# Patient Record
Sex: FEMALE | Race: OTHER | HISPANIC OR LATINO | Employment: FULL TIME | URBAN - METROPOLITAN AREA
[De-identification: names, ages, dates, MRNs, and addresses within clinical notes are randomized per-mention and may not be internally consistent; named-entity substitution may affect disease eponyms.]

---

## 2017-02-07 ENCOUNTER — ALLSCRIPTS OFFICE VISIT (OUTPATIENT)
Dept: OTHER | Facility: OTHER | Age: 22
End: 2017-02-07

## 2017-02-07 LAB — HCG, QUALITATIVE (HISTORICAL): NEGATIVE

## 2017-04-24 ENCOUNTER — GENERIC CONVERSION - ENCOUNTER (OUTPATIENT)
Dept: OTHER | Facility: OTHER | Age: 22
End: 2017-04-24

## 2017-04-24 DIAGNOSIS — N92.6 IRREGULAR MENSTRUATION: ICD-10-CM

## 2017-04-26 ENCOUNTER — APPOINTMENT (OUTPATIENT)
Dept: LAB | Facility: HOSPITAL | Age: 22
End: 2017-04-26
Attending: OBSTETRICS & GYNECOLOGY
Payer: COMMERCIAL

## 2017-04-26 ENCOUNTER — TRANSCRIBE ORDERS (OUTPATIENT)
Dept: ADMINISTRATIVE | Facility: HOSPITAL | Age: 22
End: 2017-04-26

## 2017-04-26 DIAGNOSIS — N92.6 IRREGULAR MENSTRUATION: ICD-10-CM

## 2017-04-26 LAB — B-HCG SERPL-ACNC: ABNORMAL MIU/ML

## 2017-04-26 PROCEDURE — 36415 COLL VENOUS BLD VENIPUNCTURE: CPT

## 2017-04-26 PROCEDURE — 84702 CHORIONIC GONADOTROPIN TEST: CPT

## 2017-04-27 ENCOUNTER — GENERIC CONVERSION - ENCOUNTER (OUTPATIENT)
Dept: OTHER | Facility: OTHER | Age: 22
End: 2017-04-27

## 2017-06-02 ENCOUNTER — HOSPITAL ENCOUNTER (EMERGENCY)
Facility: HOSPITAL | Age: 22
Discharge: HOME | End: 2017-06-02
Attending: EMERGENCY MEDICINE | Admitting: EMERGENCY MEDICINE
Payer: COMMERCIAL

## 2017-06-02 VITALS — TEMPERATURE: 98.7 F | OXYGEN SATURATION: 98 % | HEART RATE: 61 BPM | RESPIRATION RATE: 16 BRPM | WEIGHT: 130 LBS

## 2017-06-02 DIAGNOSIS — K04.7 DENTAL INFECTION: ICD-10-CM

## 2017-06-02 DIAGNOSIS — K08.89 PAIN, DENTAL: Primary | ICD-10-CM

## 2017-06-02 PROCEDURE — 99283 EMERGENCY DEPT VISIT LOW MDM: CPT

## 2017-06-02 RX ORDER — ALBUTEROL SULFATE 90 UG/1
2 AEROSOL, METERED RESPIRATORY (INHALATION) EVERY 6 HOURS PRN
COMMUNITY

## 2017-06-02 RX ORDER — AMOXICILLIN 500 MG/1
500 CAPSULE ORAL 3 TIMES DAILY
Qty: 21 CAPSULE | Refills: 0 | Status: SHIPPED | OUTPATIENT
Start: 2017-06-02 | End: 2017-06-09

## 2017-06-02 RX ORDER — AMOXICILLIN 250 MG/1
500 CAPSULE ORAL ONCE
Status: COMPLETED | OUTPATIENT
Start: 2017-06-02 | End: 2017-06-02

## 2017-06-02 RX ADMIN — AMOXICILLIN 500 MG: 250 CAPSULE ORAL at 23:12

## 2017-06-08 ENCOUNTER — GENERIC CONVERSION - ENCOUNTER (OUTPATIENT)
Dept: OTHER | Facility: OTHER | Age: 22
End: 2017-06-08

## 2017-06-08 ENCOUNTER — ALLSCRIPTS OFFICE VISIT (OUTPATIENT)
Dept: OTHER | Facility: OTHER | Age: 22
End: 2017-06-08

## 2017-06-08 DIAGNOSIS — Z34.90 ENCOUNTER FOR SUPERVISION OF NORMAL PREGNANCY: ICD-10-CM

## 2017-06-19 ENCOUNTER — GENERIC CONVERSION - ENCOUNTER (OUTPATIENT)
Dept: OTHER | Facility: OTHER | Age: 22
End: 2017-06-19

## 2017-06-19 ENCOUNTER — ALLSCRIPTS OFFICE VISIT (OUTPATIENT)
Dept: PERINATAL CARE | Facility: CLINIC | Age: 22
End: 2017-06-19
Payer: COMMERCIAL

## 2017-06-19 PROCEDURE — 76813 OB US NUCHAL MEAS 1 GEST: CPT | Performed by: OBSTETRICS & GYNECOLOGY

## 2017-06-22 ENCOUNTER — GENERIC CONVERSION - ENCOUNTER (OUTPATIENT)
Dept: OTHER | Facility: OTHER | Age: 22
End: 2017-06-22

## 2017-07-06 ENCOUNTER — GENERIC CONVERSION - ENCOUNTER (OUTPATIENT)
Dept: OTHER | Facility: OTHER | Age: 22
End: 2017-07-06

## 2017-07-20 LAB
EXTERNAL ABO GROUPING: NORMAL
EXTERNAL ANTIBODY SCREEN: NORMAL
EXTERNAL HEMATOCRIT: 35.4 %
EXTERNAL HEMOGLOBIN: 12 G/DL
EXTERNAL HEPATITIS B SURFACE ANTIGEN: NEGATIVE
EXTERNAL HIV-1 ANTIBODY: NEGATIVE
EXTERNAL PLATELET COUNT: 312 K/ΜL
EXTERNAL RH FACTOR: NEGATIVE
EXTERNAL RH FACTOR: POSITIVE
EXTERNAL RH FACTOR: POSITIVE
EXTERNAL RUBELLA IGG QUANTITATION: NORMAL
EXTERNAL SYPHILIS RPR SCREEN: NORMAL

## 2017-07-31 ENCOUNTER — ALLSCRIPTS OFFICE VISIT (OUTPATIENT)
Dept: PERINATAL CARE | Facility: CLINIC | Age: 22
End: 2017-07-31
Payer: COMMERCIAL

## 2017-07-31 ENCOUNTER — GENERIC CONVERSION - ENCOUNTER (OUTPATIENT)
Dept: OTHER | Facility: OTHER | Age: 22
End: 2017-07-31

## 2017-07-31 PROCEDURE — 76805 OB US >/= 14 WKS SNGL FETUS: CPT | Performed by: OBSTETRICS & GYNECOLOGY

## 2017-07-31 PROCEDURE — 76817 TRANSVAGINAL US OBSTETRIC: CPT | Performed by: OBSTETRICS & GYNECOLOGY

## 2017-08-07 ENCOUNTER — ALLSCRIPTS OFFICE VISIT (OUTPATIENT)
Dept: OTHER | Facility: OTHER | Age: 22
End: 2017-08-07

## 2017-08-07 LAB
BACTERIA UR QL AUTO: NEGATIVE
CLUE CELL (HISTORICAL): NEGATIVE
HYPHAL YEAST (HISTORICAL): POSITIVE
KOH PREP (HISTORICAL): NEGATIVE
PH UR STRIP.AUTO: 4.5 [PH]
TRICHOMONAS (HISTORICAL): NEGATIVE

## 2017-08-15 ENCOUNTER — GENERIC CONVERSION - ENCOUNTER (OUTPATIENT)
Dept: OTHER | Facility: OTHER | Age: 22
End: 2017-08-15

## 2017-10-10 ENCOUNTER — GENERIC CONVERSION - ENCOUNTER (OUTPATIENT)
Dept: OTHER | Facility: OTHER | Age: 22
End: 2017-10-10

## 2017-10-17 ENCOUNTER — ALLSCRIPTS OFFICE VISIT (OUTPATIENT)
Dept: OTHER | Facility: OTHER | Age: 22
End: 2017-10-17

## 2017-10-17 LAB — GLUCOSE 1H P 50 G GLC PO SERPL-MCNC: 111 MG/DL (ref 70–183)

## 2017-10-19 LAB — GLUCOSE, 1HR PP (HISTORICAL): 111 MG/DL (ref 65–199)

## 2017-10-20 ENCOUNTER — GENERIC CONVERSION - ENCOUNTER (OUTPATIENT)
Dept: OTHER | Facility: OTHER | Age: 22
End: 2017-10-20

## 2017-10-30 ENCOUNTER — ALLSCRIPTS OFFICE VISIT (OUTPATIENT)
Dept: OTHER | Facility: OTHER | Age: 22
End: 2017-10-30

## 2017-10-30 LAB — HGB BLD-MCNC: 9.8 G/DL

## 2017-11-03 ENCOUNTER — HOSPITAL ENCOUNTER (OUTPATIENT)
Facility: HOSPITAL | Age: 22
Discharge: HOME | End: 2017-11-03
Attending: OBSTETRICS & GYNECOLOGY | Admitting: OBSTETRICS & GYNECOLOGY
Payer: COMMERCIAL

## 2017-11-03 VITALS
OXYGEN SATURATION: 98 % | HEART RATE: 91 BPM | RESPIRATION RATE: 20 BRPM | DIASTOLIC BLOOD PRESSURE: 81 MMHG | SYSTOLIC BLOOD PRESSURE: 126 MMHG | TEMPERATURE: 97.9 F

## 2017-11-03 DIAGNOSIS — O36.8190 DECREASED FETAL MOVEMENT: ICD-10-CM

## 2017-11-03 PROCEDURE — G0463 HOSPITAL OUTPT CLINIC VISIT: HCPCS

## 2017-11-03 PROCEDURE — 99201 HB OFFICE/OUTPATIENT VISIT NEW (BRIEF): CPT

## 2017-11-04 NOTE — PROGRESS NOTES
Pt request to sign AMA  States no bleeding since admission  Denies pain or contractions  Fetus "very active"  Risks explained   AMA form signed by patient

## 2017-11-14 ENCOUNTER — GENERIC CONVERSION - ENCOUNTER (OUTPATIENT)
Dept: OTHER | Facility: OTHER | Age: 22
End: 2017-11-14

## 2017-11-27 ENCOUNTER — ALLSCRIPTS OFFICE VISIT (OUTPATIENT)
Dept: OTHER | Facility: OTHER | Age: 22
End: 2017-11-27

## 2017-11-27 ENCOUNTER — GENERIC CONVERSION - ENCOUNTER (OUTPATIENT)
Dept: OTHER | Facility: OTHER | Age: 22
End: 2017-11-27

## 2017-11-27 LAB — EXTERNAL GROUP B STREP ANTIGEN: NEGATIVE

## 2017-12-01 LAB — CULTURE GENITAL-BSB ON (HISTORICAL): NEGATIVE

## 2017-12-04 ENCOUNTER — ALLSCRIPTS OFFICE VISIT (OUTPATIENT)
Dept: OTHER | Facility: OTHER | Age: 22
End: 2017-12-04

## 2017-12-04 ENCOUNTER — GENERIC CONVERSION - ENCOUNTER (OUTPATIENT)
Dept: OTHER | Facility: OTHER | Age: 22
End: 2017-12-04

## 2017-12-06 ENCOUNTER — GENERIC CONVERSION - ENCOUNTER (OUTPATIENT)
Dept: OTHER | Facility: OTHER | Age: 22
End: 2017-12-06

## 2017-12-07 ENCOUNTER — HOSPITAL ENCOUNTER (INPATIENT)
Facility: HOSPITAL | Age: 22
LOS: 2 days | Discharge: HOME | End: 2017-12-10
Attending: OBSTETRICS & GYNECOLOGY | Admitting: OBSTETRICS & GYNECOLOGY
Payer: COMMERCIAL

## 2017-12-07 ENCOUNTER — HOSPITAL ENCOUNTER (OUTPATIENT)
Facility: HOSPITAL | Age: 22
Discharge: HOME | End: 2017-12-07
Attending: OBSTETRICS & GYNECOLOGY | Admitting: OBSTETRICS & GYNECOLOGY
Payer: COMMERCIAL

## 2017-12-07 ENCOUNTER — GENERIC CONVERSION - ENCOUNTER (OUTPATIENT)
Dept: OTHER | Facility: OTHER | Age: 22
End: 2017-12-07

## 2017-12-07 VITALS
SYSTOLIC BLOOD PRESSURE: 130 MMHG | HEART RATE: 100 BPM | RESPIRATION RATE: 17 BRPM | TEMPERATURE: 98.6 F | DIASTOLIC BLOOD PRESSURE: 61 MMHG

## 2017-12-07 DIAGNOSIS — Z3A.38 38 WEEKS GESTATION OF PREGNANCY: ICD-10-CM

## 2017-12-07 PROCEDURE — G0463 HOSPITAL OUTPT CLINIC VISIT: HCPCS

## 2017-12-07 PROCEDURE — 99213 OFFICE O/P EST LOW 20 MIN: CPT

## 2017-12-07 NOTE — PROGRESS NOTES
Patient discharged to home by doctor  Discharge instructions/precautions reviewed with patient by Dr Alissa Mahan  Patient verbalizes understanding & denies further questions at this time

## 2017-12-07 NOTE — PROGRESS NOTES
Progress Note - OB/GYN   Justice Civil 20 y o  female MRN: [id number]  Unit/Bed#: L&D 329-01 Encounter: [id number]    Assessment:  21yo  @ 38 1wga here with decreased fetal movement, movement reassured  Plan:  D/C home with labor precautions  Reviewed techniques to improve fetal movement - PO hydration, frequent meals, sugary snack/drink, palpation of abdomen with hands  Recommend patient to follow-up in office tomorrow  D/W Dr Dina Paul  Subjective/Objective   Chief Complaint: decreased fetal movement since Monday    Subjective:   -reports decreased FM since Monday intermittently  -was seen in office ANGELIC 9cm, NST reactive & reassuring  -denies any changes in life stressors or work or home  -denies cxtns, LOF, VB    Objective:   -'sbpm reactive  -[place] occasional  -TAUS: vertex, +fetal movement, ANGELIC 10 86, LVP 4 22cm  -BPP: 8/10 (2/2 NST, 2/2 ANGELIC, 2/2 fetal tone, 2/2 fetal movements)    Vitals: Blood pressure 130/61, pulse 100, temperature 98 6 °F (37 °C), temperature source Oral, resp  rate 17, last menstrual period 2017  ,There is no height or weight on file to calculate BMI      No intake or output data in the 24 hours ending 17 1334    Invasive Devices          No matching active lines, drains, or airways

## 2017-12-08 ENCOUNTER — ANESTHESIA (INPATIENT)
Dept: LABOR AND DELIVERY | Facility: HOSPITAL | Age: 22
End: 2017-12-08
Payer: COMMERCIAL

## 2017-12-08 ENCOUNTER — ANESTHESIA EVENT (INPATIENT)
Dept: LABOR AND DELIVERY | Facility: HOSPITAL | Age: 22
End: 2017-12-08
Payer: COMMERCIAL

## 2017-12-08 LAB
ABO GROUP BLD: NORMAL
BASE EXCESS BLDCOA CALC-SCNC: 0.5 MMOL/L (ref 3–11)
BASE EXCESS BLDCOV CALC-SCNC: -1 MMOL/L (ref 1–9)
BLD GP AB SCN SERPL QL: NEGATIVE
ERYTHROCYTE [DISTWIDTH] IN BLOOD BY AUTOMATED COUNT: 14 % (ref 11.6–15.1)
HCO3 BLDCOA-SCNC: 27.9 MMOL/L (ref 17.3–27.3)
HCO3 BLDCOV-SCNC: 23.1 MMOL/L (ref 12.2–28.6)
HCT VFR BLD AUTO: 33.3 % (ref 34.8–46.1)
HGB BLD-MCNC: 10.9 G/DL (ref 11.5–15.4)
MCH RBC QN AUTO: 25.6 PG (ref 26.8–34.3)
MCHC RBC AUTO-ENTMCNC: 32.7 G/DL (ref 31.4–37.4)
MCV RBC AUTO: 78 FL (ref 82–98)
O2 CT VFR BLDCOA CALC: 3.1 ML/DL
OXYHGB MFR BLDCOA: 14.7 %
OXYHGB MFR BLDCOV: 52 %
PCO2 BLDCOA: 56 MM[HG] (ref 30–60)
PCO2 BLDCOV: 37 MM HG (ref 27–43)
PH BLDCOA: 7.32 [PH] (ref 7.23–7.43)
PH BLDCOV: 7.41 [PH] (ref 7.19–7.49)
PLATELET # BLD AUTO: 321 THOUSANDS/UL (ref 149–390)
PMV BLD AUTO: 10.1 FL (ref 8.9–12.7)
PO2 BLDCOA: 10.9 MM HG (ref 5–25)
PO2 BLDCOV: 22.3 MM HG (ref 15–45)
RBC # BLD AUTO: 4.26 MILLION/UL (ref 3.81–5.12)
RH BLD: POSITIVE
RPR SER QL: NORMAL
SAO2 % BLDCOV: 11.5 ML/DL
SPECIMEN EXPIRATION DATE: NORMAL
WBC # BLD AUTO: 11.96 THOUSAND/UL (ref 4.31–10.16)

## 2017-12-08 PROCEDURE — 85027 COMPLETE CBC AUTOMATED: CPT | Performed by: OBSTETRICS & GYNECOLOGY

## 2017-12-08 PROCEDURE — 3E0R3BZ INTRODUCTION OF ANESTHETIC AGENT INTO SPINAL CANAL, PERCUTANEOUS APPROACH: ICD-10-PCS | Performed by: ANESTHESIOLOGY

## 2017-12-08 PROCEDURE — 00HU33Z INSERTION OF INFUSION DEVICE INTO SPINAL CANAL, PERCUTANEOUS APPROACH: ICD-10-PCS | Performed by: ANESTHESIOLOGY

## 2017-12-08 PROCEDURE — 86850 RBC ANTIBODY SCREEN: CPT | Performed by: OBSTETRICS & GYNECOLOGY

## 2017-12-08 PROCEDURE — 10907ZC DRAINAGE OF AMNIOTIC FLUID, THERAPEUTIC FROM PRODUCTS OF CONCEPTION, VIA NATURAL OR ARTIFICIAL OPENING: ICD-10-PCS | Performed by: OBSTETRICS & GYNECOLOGY

## 2017-12-08 PROCEDURE — 3E033VJ INTRODUCTION OF OTHER HORMONE INTO PERIPHERAL VEIN, PERCUTANEOUS APPROACH: ICD-10-PCS | Performed by: OBSTETRICS & GYNECOLOGY

## 2017-12-08 PROCEDURE — 88307 TISSUE EXAM BY PATHOLOGIST: CPT | Performed by: OBSTETRICS & GYNECOLOGY

## 2017-12-08 PROCEDURE — 86900 BLOOD TYPING SEROLOGIC ABO: CPT | Performed by: OBSTETRICS & GYNECOLOGY

## 2017-12-08 PROCEDURE — 86592 SYPHILIS TEST NON-TREP QUAL: CPT | Performed by: OBSTETRICS & GYNECOLOGY

## 2017-12-08 PROCEDURE — 82805 BLOOD GASES W/O2 SATURATION: CPT | Performed by: OBSTETRICS & GYNECOLOGY

## 2017-12-08 PROCEDURE — 86901 BLOOD TYPING SEROLOGIC RH(D): CPT | Performed by: OBSTETRICS & GYNECOLOGY

## 2017-12-08 RX ORDER — DIPHENHYDRAMINE HCL 25 MG
25 TABLET ORAL EVERY 6 HOURS PRN
Status: DISCONTINUED | OUTPATIENT
Start: 2017-12-08 | End: 2017-12-10 | Stop reason: HOSPADM

## 2017-12-08 RX ORDER — CALCIUM CARBONATE 200(500)MG
1000 TABLET,CHEWABLE ORAL DAILY PRN
Status: DISCONTINUED | OUTPATIENT
Start: 2017-12-08 | End: 2017-12-10 | Stop reason: HOSPADM

## 2017-12-08 RX ORDER — IBUPROFEN 600 MG/1
600 TABLET ORAL EVERY 4 HOURS PRN
Status: DISCONTINUED | OUTPATIENT
Start: 2017-12-08 | End: 2017-12-09

## 2017-12-08 RX ORDER — SIMETHICONE 80 MG
80 TABLET,CHEWABLE ORAL 4 TIMES DAILY PRN
Status: DISCONTINUED | OUTPATIENT
Start: 2017-12-08 | End: 2017-12-10 | Stop reason: HOSPADM

## 2017-12-08 RX ORDER — SODIUM CHLORIDE, SODIUM LACTATE, POTASSIUM CHLORIDE, CALCIUM CHLORIDE 600; 310; 30; 20 MG/100ML; MG/100ML; MG/100ML; MG/100ML
125 INJECTION, SOLUTION INTRAVENOUS CONTINUOUS
Status: DISCONTINUED | OUTPATIENT
Start: 2017-12-08 | End: 2017-12-08

## 2017-12-08 RX ORDER — ONDANSETRON 2 MG/ML
4 INJECTION INTRAMUSCULAR; INTRAVENOUS EVERY 8 HOURS PRN
Status: DISCONTINUED | OUTPATIENT
Start: 2017-12-08 | End: 2017-12-10 | Stop reason: HOSPADM

## 2017-12-08 RX ORDER — OXYTOCIN/RINGER'S LACTATE 30/500 ML
2 PLASTIC BAG, INJECTION (ML) INTRAVENOUS
Status: DISCONTINUED | OUTPATIENT
Start: 2017-12-08 | End: 2017-12-08

## 2017-12-08 RX ORDER — DOCUSATE SODIUM 100 MG/1
100 CAPSULE, LIQUID FILLED ORAL 2 TIMES DAILY
Status: DISCONTINUED | OUTPATIENT
Start: 2017-12-08 | End: 2017-12-10 | Stop reason: HOSPADM

## 2017-12-08 RX ORDER — ALBUTEROL SULFATE 90 UG/1
2 AEROSOL, METERED RESPIRATORY (INHALATION) EVERY 6 HOURS PRN
Status: DISCONTINUED | OUTPATIENT
Start: 2017-12-08 | End: 2017-12-08

## 2017-12-08 RX ORDER — ONDANSETRON 2 MG/ML
4 INJECTION INTRAMUSCULAR; INTRAVENOUS EVERY 6 HOURS PRN
Status: DISCONTINUED | OUTPATIENT
Start: 2017-12-08 | End: 2017-12-08

## 2017-12-08 RX ORDER — ACETAMINOPHEN 325 MG/1
650 TABLET ORAL EVERY 4 HOURS PRN
Status: DISCONTINUED | OUTPATIENT
Start: 2017-12-08 | End: 2017-12-10 | Stop reason: HOSPADM

## 2017-12-08 RX ORDER — DIAPER,BRIEF,INFANT-TODD,DISP
1 EACH MISCELLANEOUS AS NEEDED
Status: DISCONTINUED | OUTPATIENT
Start: 2017-12-08 | End: 2017-12-10 | Stop reason: HOSPADM

## 2017-12-08 RX ORDER — OXYCODONE HYDROCHLORIDE AND ACETAMINOPHEN 5; 325 MG/1; MG/1
2 TABLET ORAL EVERY 4 HOURS PRN
Status: DISCONTINUED | OUTPATIENT
Start: 2017-12-08 | End: 2017-12-10 | Stop reason: HOSPADM

## 2017-12-08 RX ORDER — OXYCODONE HYDROCHLORIDE AND ACETAMINOPHEN 5; 325 MG/1; MG/1
1 TABLET ORAL EVERY 4 HOURS PRN
Status: DISCONTINUED | OUTPATIENT
Start: 2017-12-08 | End: 2017-12-10 | Stop reason: HOSPADM

## 2017-12-08 RX ORDER — ROPIVACAINE HYDROCHLORIDE 2 MG/ML
INJECTION, SOLUTION EPIDURAL; INFILTRATION; PERINEURAL AS NEEDED
Status: DISCONTINUED | OUTPATIENT
Start: 2017-12-08 | End: 2017-12-08 | Stop reason: SURG

## 2017-12-08 RX ORDER — OXYTOCIN/RINGER'S LACTATE 30/500 ML
2 PLASTIC BAG, INJECTION (ML) INTRAVENOUS CONTINUOUS
Status: DISCONTINUED | OUTPATIENT
Start: 2017-12-08 | End: 2017-12-08

## 2017-12-08 RX ORDER — EPHEDRINE SULFATE 50 MG/ML
INJECTION, SOLUTION INTRAVENOUS AS NEEDED
Status: DISCONTINUED | OUTPATIENT
Start: 2017-12-08 | End: 2017-12-08 | Stop reason: SURG

## 2017-12-08 RX ADMIN — IBUPROFEN 600 MG: 600 TABLET, FILM COATED ORAL at 16:25

## 2017-12-08 RX ADMIN — EPHEDRINE SULFATE 10 MG: 50 INJECTION, SOLUTION INTRAMUSCULAR; INTRAVENOUS; SUBCUTANEOUS at 09:35

## 2017-12-08 RX ADMIN — ROPIVACAINE HYDROCHLORIDE: 2 INJECTION, SOLUTION EPIDURAL; INFILTRATION at 08:45

## 2017-12-08 RX ADMIN — ROPIVACAINE HYDROCHLORIDE 5 ML: 2 INJECTION, SOLUTION EPIDURAL; INFILTRATION at 08:42

## 2017-12-08 RX ADMIN — IBUPROFEN 600 MG: 600 TABLET, FILM COATED ORAL at 22:12

## 2017-12-08 RX ADMIN — SODIUM CHLORIDE, SODIUM LACTATE, POTASSIUM CHLORIDE, AND CALCIUM CHLORIDE 999 ML/HR: .6; .31; .03; .02 INJECTION, SOLUTION INTRAVENOUS at 07:51

## 2017-12-08 RX ADMIN — SODIUM CHLORIDE, SODIUM LACTATE, POTASSIUM CHLORIDE, AND CALCIUM CHLORIDE 125 ML/HR: .6; .31; .03; .02 INJECTION, SOLUTION INTRAVENOUS at 00:31

## 2017-12-08 RX ADMIN — ONDANSETRON 4 MG: 2 INJECTION INTRAMUSCULAR; INTRAVENOUS at 05:06

## 2017-12-08 RX ADMIN — ROPIVACAINE HYDROCHLORIDE 5 ML: 2 INJECTION, SOLUTION EPIDURAL; INFILTRATION at 08:39

## 2017-12-08 RX ADMIN — DOCUSATE SODIUM 100 MG: 100 CAPSULE, LIQUID FILLED ORAL at 18:19

## 2017-12-08 RX ADMIN — SODIUM CHLORIDE, SODIUM LACTATE, POTASSIUM CHLORIDE, AND CALCIUM CHLORIDE 125 ML/HR: .6; .31; .03; .02 INJECTION, SOLUTION INTRAVENOUS at 09:01

## 2017-12-08 RX ADMIN — SODIUM CHLORIDE, SODIUM LACTATE, POTASSIUM CHLORIDE, AND CALCIUM CHLORIDE 125 ML/HR: .6; .31; .03; .02 INJECTION, SOLUTION INTRAVENOUS at 10:11

## 2017-12-08 RX ADMIN — Medication 2 MILLI-UNITS/MIN: at 00:32

## 2017-12-08 NOTE — OB LABOR/OXYTOCIN SAFETY PROGRESS
Oxytocin Safety Progress Check Note - Gus Paez 20 y o  female MRN: [id number]    Unit/Bed#: L&D 326-01 Encounter: [id number]    Obstetric History       T1      L1     SAB0   TAB0   Ectopic0   Multiple0   Live Births1      Gestational Age: 36w4d  Dose (fidel-units/min) Oxytocin: 12 fidel-units/min  Contraction Frequency (minutes): 2-3 5  Contraction Quality: Moderate  Tachysystole: No   Dilation: 10  Dilation Complete Date: 17  Dilation Complete Time: 1024  Effacement (%): 100  Station: 1  Baseline Rate: 130 bpm     FHR Category: Category I          Notes/comments:     Patient is complete  Does not feel need to push - will labor down  FHT Category I    Will discuss with Dr Jes Morales MD 2017 10:24 AM

## 2017-12-08 NOTE — PLAN OF CARE
Problem: BIRTH - VAGINAL/ SECTION  Goal: Fetal and maternal status remain reassuring during the birth process  INTERVENTIONS:  - Monitor vital signs  - Monitor fetal heart rate  - Monitor uterine activity  - Monitor labor progression (vaginal delivery)  - DVT prophylaxis  - Antibiotic prophylaxis   Outcome: Completed Date Met: 17    Goal: Emotionally satisfying birthing experience for mother/fetus  Interventions:  - Assess, plan, implement and evaluate the nursing care given to the patient in labor  - Advocate the philosophy that each childbirth experience is a unique experience and support the family's chosen level of involvement and control during the labor process   - Actively participate in both the patient's and family's teaching of the birth process  - Consider cultural, [employer] and age-specific factors and plan care for the patient in labor   Outcome: Completed Date Met: 17      Problem: Knowledge Deficit  Goal: Verbalizes understanding of labor plan  Assess patient/family/caregiver's baseline knowledge level and ability to understand information  Provide education via patient/family/caregiver's preferred learning method at appropriate level of understanding  1  Provide teaching at level of understanding  2  Provide teaching via preferred learning method(s)  Outcome: Completed Date Met: 17      Problem: Labor & Delivery  Goal: Manages discomfort  Assess and monitor for signs and symptoms of discomfort  Assess patient's pain level regularly and per hospital policy  Administer medications as ordered  Support use of nonpharmacological methods to help control pain such as distraction, imagery, relaxation, and application of heat and cold  Collaborate with interdisciplinary team and patient to determine appropriate pain management plan  1  Include patient in decisions related to comfort  2  Offer non-pharmacological pain management interventions    3  Report ineffective pain management to physician  Outcome: Completed Date Met: 12/08/17    Goal: Patient vital signs are stable  1  Assess vital signs - vaginal delivery     Outcome: Completed Date Met: 12/08/17

## 2017-12-08 NOTE — DISCHARGE INSTRUCTIONS
Vaginal Delivery   WHAT YOU SHOULD KNOW:   A vaginal delivery is the birth of your baby through your vagina (birth canal)  AFTER YOU LEAVE:   Medicines:  · NSAIDs  help decrease swelling and pain or fever  This medicine is available with or without a doctor's order  NSAIDs can cause stomach bleeding or kidney problems in certain people  If you take blood thinner medicine, always ask your healthcare provider if NSAIDs are safe for you  Always read the medicine label and follow directions  · Take your medicine as directed  Call your healthcare provider if you think your medicine is not helping or if you have side effects  Tell him if you are allergic to any medicine  Keep a list of the medicines, vitamins, and herbs you take  Include the amounts, and when and why you take them  Bring the list or the pill bottles to follow-up visits  Carry your medicine list with you in case of an emergency  Follow up with your primary healthcare provider:  Most women need to return 6 weeks after a vaginal delivery  Ask about how to care for your wounds or stitches  Write down your questions so you remember to ask them during your visits  Activity:  Rest as much as possible  Try to keep all activities short  You may be able to do some exercise soon after you have your baby  Talk with your primary healthcare provider before you start exercising  If you work outside the home, ask when you can return to your job  Kegel exercises:  Kegel exercises may help your vaginal and rectal muscles heal faster  You can do Kegel exercises by tightening and relaxing the muscles around your vagina  Kegel exercises help make the muscles stronger  Breast care:  When your milk comes in, your breasts may feel full and hard  Ask how to care for your breasts, even if you are not breastfeeding  Constipation:  Do not try to push the bowel movement out if it is too hard   High-fiber foods, extra liquids, and regular exercise can help you prevent constipation  Examples of high-fiber foods are fruit and bran  Prune juice and water are good liquids to drink  Regular exercise helps your digestive system work  You may also be told to take over-the-counter fiber and stool softener medicines  Take these items as directed  Hemorrhoids:  Pregnancy can cause severe hemorrhoids  You may have rectal pain because of the hemorrhoids  Ask how to prevent or treat hemorrhoids  Perineum care: Your perineum is the area between your vagina and anus  Keep the area clean and dry to help it heal and to prevent infection  Wash the area gently with soap and water when you bathe or shower  Rinse your perineum with warm water when you use the toilet  Your primary healthcare provider may suggest you use a warm sitz bath to help decrease pain  A sitz bath is a bathtub or basin filled to hip level  Stay in the sitz bath for 20 to 30 minutes, or as directed  Vaginal discharge: You will have vaginal discharge, called lochia, after your delivery  The lochia is bright red the first day or two after the birth  By the fourth day, the amount decreases, and it turns red-brown  Use a sanitary pad rather than a tampon to prevent a vaginal infection  It is normal to have lochia up to 8 weeks after your baby is born  Monthly periods: Your period may start again within 7 to 12 weeks after your baby is born  If you are breastfeeding, it may take longer for your period to start again  You can still get pregnant again even though you do not have your monthly period  Talk with your primary healthcare provider about a birth control method that will be good for you if you do not want to get pregnant  Mood changes: Many new mothers have some kind of mood changes after delivery  Some of these changes occur because of lack of sleep, hormone changes, and caring for a new baby  Some mood changes can be more serious, such as postpartum depression   Talk with your primary healthcare provider if you feel unable to care for yourself or your baby  Sexual activity:  You may need to avoid sex for 6 to 7 weeks after you have your baby  You may notice you have a decreased desire for sex, or sex may be painful  You may need to use a vaginal lubricant (gel) to help make sex more comfortable  Contact your primary healthcare provider if:   · You have heavy vaginal bleeding that fills 1 or more sanitary pads in 1 hour  · You have a fever  · Your pain does not go away, or gets worse  · The skin between your vagina and rectum is swollen, warm, or red  · You have swollen, hard, or painful breasts  · You feel very sad or depressed  · You feel more tired than usual      · You have questions or concerns about your condition or care  Seek care immediately or call 911 if:   · You have pus or yellow drainage coming from your vagina or wound  · You are urinating very little, or not at all  · Your arm or leg feels warm, tender, and painful  It may look swollen and red  · You feel lightheaded, have sudden and worsening chest pain, or trouble breathing  You may have more pain when you take deep breaths or cough, or you may cough up blood  © 2014 [address] is for End User's use only and may not be sold, redistributed or otherwise used for commercial purposes  All illustrations and images included in CareNotes® are the copyrighted property of [employer]  or [employer]  The above information is an  only  It is not intended as medical advice for individual conditions or treatments  Talk to your doctor, nurse or pharmacist before following any medical regimen to see if it is safe and effective for you

## 2017-12-08 NOTE — DISCHARGE SUMMARY
Discharge Summary - Justice Civil 20 y o  female MRN: [id number]    Unit/Bed#: L&D 326-01 Encounter: [id number]    Admission Date: 2017     Discharge Date: 12/10/2017    Attending: Dr Jus Martin    Principal Diagnosis:   Decreased fetal movement affecting management of mother, antepartum [O36 [id number]]  Pregnancy at 38w1d    Secondary Diagnosis:   Anemia  LSIL  Mild intermittent asthma    Procedures: Spontaneous Vaginal Delivery    Complications: none apparent    Hospital Course: Ms Ankush Holcomb is a 20 y o   at 38wk1d  She presented to labor and delivery for induction of labor for persistent decreased fetal movement  Her pregnancy was complicated by iron deficiency anemia, a history of LSIL, and mild intermittent asthma  On exam in triage she was noted to be 3-4/70/-2  She was admitted for induction of labor  She was managed expectantly for labor and received an epidural for pain control  Patient was started on low dose pitocin titration and was AROM'd  She delivered via spontaneous vaginal delivery with no complications and bilateral periurethral lacerations that did not require repair  She had an uncomplicated postpartum course  Condition at discharge: good     On day of discharge, patient was tolerating PO, passing flatus, urinating, and ambulating  Her pain was well controlled with oral analgesics  She was discharged home on postpartum day #2 with standard post partum instructions to follow up with her physician in 3-6 weeks for a postpartum appointment  Discharge instructions/Information to patient and family:   See after visit summary for information provided to patient and family  Provisions for Follow-Up Care:  See after visit summary for information related to follow-up care and any pertinent home health orders  Disposition: See After Visit Summary for discharge disposition information  Planned Readmission: No    Discharge Medications:   For a complete list of the patient's medications, please refer to her med rec      Radha Gregory MD  12/10/2017  7:28 AM

## 2017-12-08 NOTE — OB LABOR/OXYTOCIN SAFETY PROGRESS
Oxytocin Safety Progress Check Note - Anastasiya Tiana 20 y o  female MRN: [id number]    Unit/Bed#: L&D 326-01 Encounter: [id number]    Obstetric History       T1      L1     SAB0   TAB0   Ectopic0   Multiple0   Live Births1      Gestational Age: 38w2d  Dose (fidel-units/min) Oxytocin: 10 fidel-units/min  Contraction Frequency (minutes): 2-3 5  Contraction Quality: Moderate  Tachysystole: No   Dilation: 5        Effacement (%): 100  Station: -1  Baseline Rate: 125 bpm (difficult tracing due to maternal movement)     FHR Category: Category I          Notes/comments:      Patient comfortable with epidural  Continue pitocin titration with FHT monitoring  Ctx every 2-3 mins  Category I strip    Will discuss with Dr Kiki Ordonez MD 2017 9:30 AM

## 2017-12-08 NOTE — ANESTHESIA PREPROCEDURE EVALUATION
Review of Systems/Medical History  Patient summary reviewed        Cardiovascular  Negative cardio ROS    Pulmonary  Negative pulmonary ROS Asthma: , ,        GI/Hepatic  Negative GI/hepatic ROS          Negative  ROS        Endo/Other  Negative endo/other ROS      GYN  Negative gynecology ROS          Hematology  Negative hematology ROS      Musculoskeletal  Negative musculoskeletal ROS        Neurology  Negative neurology ROS      Psychology   Negative psychology ROS            Physical Exam    Airway    Mallampati score: II  TM Distance: >3 FB  Neck ROM: full     Dental       Cardiovascular  Comment: Negative ROS, Rhythm: regular, Rate: normal, Cardiovascular exam normal    Pulmonary  Pulmonary exam normal Breath sounds clear to auscultation,     Other Findings        Anesthesia Plan  ASA Score- 2       Anesthesia Type- epidural with ASA Monitors  Additional Monitors:   Airway Plan:           Induction-       Informed Consent- Anesthetic plan and risks discussed with patient

## 2017-12-08 NOTE — PLAN OF CARE
Problem: Knowledge Deficit  Goal: Patient/family/caregiver demonstrates understanding of disease process, treatment plan, medications, and discharge instructions  Complete learning assessment and assess knowledge base    Interventions:  - Provide teaching at level of understanding  - Provide teaching via preferred learning methods  Outcome: Progressing      Problem: PAIN - ADULT  Goal: Verbalizes/displays adequate comfort level or baseline comfort level  Interventions:  - Encourage patient to monitor pain and request assistance  - Assess pain using appropriate pain scale  - Administer analgesics based on type and severity of pain and evaluate response  - Implement non-pharmacological measures as appropriate and evaluate response  - Consider cultural and social influences on pain and pain management  - Notify physician/advanced practitioner if interventions unsuccessful or patient reports new pain  Outcome: Progressing      Problem: INFECTION - ADULT  Goal: Absence or prevention of progression during hospitalization  INTERVENTIONS:  - Assess and monitor for signs and symptoms of infection  - Monitor lab/diagnostic results  - Monitor all insertion sites, i e  indwelling lines, tubes, and drains  - Monitor endotracheal (as able) and nasal secretions for changes in amount and color  - [place] appropriate cooling/warming therapies per order  - Administer medications as ordered  - Instruct and encourage patient and family to use good hand hygiene technique  - Identify and instruct in appropriate isolation precautions for identified infection/condition  Outcome: Progressing    Goal: Absence of fever/infection during neutropenic period  INTERVENTIONS:  - Monitor WBC  - Implement neutropenic guidelines  Outcome: Progressing      Problem: SAFETY ADULT  Goal: Patient will remain free of falls  INTERVENTIONS:  - Assess patient frequently for physical needs  -  Identify cognitive and physical deficits and behaviors that affect risk of falls    -  [place] fall precautions as indicated by assessment   - Educate patient/family on patient safety including physical limitations  - Instruct patient to call for assistance with activity based on assessment  - Modify environment to reduce risk of injury  - Consider OT/PT consult to assist with strengthening/mobility  Outcome: Progressing    Goal: Maintain or return to baseline ADL function  INTERVENTIONS:  -  Assess patient's ability to carry out ADLs; assess patient's baseline for ADL function and identify physical deficits which impact ability to perform ADLs (bathing, care of mouth/teeth, toileting, grooming, dressing, etc )  - Assess/evaluate cause of self-care deficits   - Assess range of motion  - Assess patient's mobility; develop plan if impaired  - Assess patient's need for assistive devices and provide as appropriate  - Encourage maximum independence but intervene and supervise when necessary  ¯ Involve family in performance of ADLs  ¯ Assess for home care needs following discharge   ¯ Request OT consult to assist with ADL evaluation and planning for discharge  ¯ Provide patient education as appropriate  Outcome: Progressing    Goal: Maintain or return mobility status to optimal level  INTERVENTIONS:  - Assess patient's baseline mobility status (ambulation, transfers, stairs, etc )    - Identify cognitive and physical deficits and behaviors that affect mobility  - Identify mobility aids required to assist with transfers and/or ambulation (gait belt, sit-to-stand, lift, walker, cane, etc )  - [place] fall precautions as indicated by assessment  - Record patient progress and toleration of activity level on Mobility SBAR; progress patient to next Phase/Stage  - Instruct patient to call for assistance with activity based on assessment  - Request Rehabilitation consult to assist with strengthening/weightbearing, etc   Outcome: Progressing      Problem: DISCHARGE PLANNING  Goal: Discharge to home or other facility with appropriate resources  INTERVENTIONS:  - Identify barriers to discharge w/patient and caregiver  - Arrange for needed discharge resources and transportation as appropriate  - Identify discharge learning needs (meds, wound care, etc )  - Arrange for interpretive services to assist at discharge as needed  - Refer to Case Management Department for coordinating discharge planning if the patient needs post-hospital services based on physician/advanced practitioner order or complex needs related to functional status, cognitive ability, or social support system  Outcome: Progressing

## 2017-12-08 NOTE — H&P
History & Physical - OB/GYN   Neri Mendez 20 y o  female MRN: [id number]  Unit/Bed#: L&D 326-01 Encounter: [id number]    Neri Mendez is a patient of [facility]    Chief complaint:  "I haven't felt my baby move as much since Monday"    HPI:  Neri Mendez is a 20 y o   female with an JILLIAN of 2017, by Ultrasound at 38w1d weeks gestation who is being admitted for Induction of labor for persistent decreased fetal movement  She states that although she feels the fetus move; it moves perhaps 4-5x in a timeframe that it would normally move twice as much  This has been consistent over the course of Monday, Tuesday, Wednesday, and Thursday and she is quite concerned  Otherwise, she states she feels well  Does not feel her contractions and has no vaginal bleeding or leakage of fluids from below  Contractions:  no  Fetal movement:  Yes, but decreased, see above  Vaginal bleeding:   no  Leaking of fluid:  no    Pregnancy Complications:  Anemia  LSIL  Mild intermittent asthma - Albuterol PRN    PMH:  Past Medical History:   Diagnosis Date    Asthma      PSH:  Past Surgical History:   Procedure Laterality Date    TONSILLECTOMY         Social Hx:  Denies EtOH, cigarette, and recreational drug use in pregnancy    OB Hx:  Obstetric History       T1      L1     SAB0   TAB0   Ectopic0   Multiple0   Live Births1       # Outcome Date GA Lbr Ronald/2nd Weight Sex Delivery Anes PTL Lv   3 Current            2 Term 13 40w0d   M    MARGO   1                    Meds:  No current facility-administered medications on file prior to encounter  Current Outpatient Prescriptions on File Prior to Encounter   Medication Sig Dispense Refill    albuterol (PROVENTIL HFA,VENTOLIN HFA) 90 mcg/act inhaler Inhale 2 puffs every 6 (six) hours as needed for wheezing         Allergies:  No Known Allergies    Review of Systems   Respiratory: Negative for chest tightness      Cardiovascular: Negative for chest pain and leg swelling  Gastrointestinal: Negative for diarrhea, nausea and vomiting  Genitourinary: Negative for flank pain and vaginal bleeding  Skin: Negative for rash  Physical Exam   Constitutional: She is oriented to person, place, and time  She appears well-developed and well-nourished  HENT:   Head: Normocephalic and atraumatic  Eyes: Pupils are equal, round, and reactive to light  Neck: Normal range of motion  Neck supple  Cardiovascular: Normal rate  Pulmonary/Chest: Effort normal and breath sounds normal  She has no wheezes  Abdominal: Soft  Bowel sounds are normal  There is no tenderness  gravid   Genitourinary: Vagina normal and uterus normal    Musculoskeletal: Normal range of motion  Neurological: She is alert and oriented to person, place, and time  Skin: Skin is warm and dry  Psychiatric: She has a normal mood and affect  Her behavior is normal  Judgment and thought content normal      Cervix:   3-4/70/-2; posterior; moderate    Fetal heart rate:    130bpm/moderate/ reactive accelerations/no decelerations    [place]:    n/a    EFW: 7lb    GBS: negative    Membranes: intact    Labs:  Blood type: O+  Antibody: negative  Group B strep: negative  HIV: negative  Hepatitis B: negative  RPR: non-reactive   Rubella: Not immune  Varicella Immune  1 hour Glucose: normal    Assessment:   20 y o   at 38w1d admitted for IOL 2/2 persistent decreased fetal movement    Plan:   1  Admit to L&D  2  CBC, RPR, type and screen  3  IV LR for hydration  4  Epidural upon request  5   D/w Dr Ayesha Delaney, DO  PGY-1 OB/GYN   2017 9:32 PM

## 2017-12-08 NOTE — L&D DELIVERY NOTE
Vaginal Delivery Summary - OB/GYN   Wyona Leyden 20 y o  female MRN: [id number]  Unit/Bed#: L&D 326-01 Encounter: [id number]          Predelivery Diagnosis:  1  Pregnancy at 38w1d   2  Anemia  3  LSIL  4  Mild intermittent asthma     Postdelivery Diagnosis:  1  Same as above  2  Delivery of term     Procedure: Spontaneous Vaginal Delivery     Attending: Dr Radha Veloz    Assistant: Naina Frank    Anesthesia: Epidural    EBL: 200cc  Admission Hg: 10 9  Admission platelets: 899    Complications: none apparent    Specimens: cord blood, arterial and venous cord blood gasses, placenta to pathology    Findings:   1  Viable male at 1100, with APGARS of 8 and 9 at 1 and 5 minutes respectively,  2  Spontaneous delivery of intact placenta at 1103  3  Bilateral periurethral lacerations, hemostatic, not repaired  4  Blood gases:   Arterial pH: 7 316   Arterial base excess: 0 5   Venous pH: 7 414   Venous base excess: -1 0    Disposition:  Patient tolerated the procedure well and was recovering in labor and delivery room     Brief history and labor course:  Ms Wyona Leyden is a 20 y o   at 38wk1d  She presented to labor and delivery for induction of labor for persistent decreased fetal movement  Her pregnancy was complicated by iron deficiency anemia, a history of LSIL, and mild intermittent asthma  On exam in triage she was noted to be 3-4/70/-2  She was admitted for induction of labor  Description of procedure    After pushing for 5 minutes, at 1100 patient delivered a viable male , wt pending, apgars of 8 (1 min) and 9 (5 min)  The fetal vertex delivered spontaneously  Baby was checked for nuchal  None was found  The anterior shoulder delivered atraumatically with maternal expulsive forces and the assistance of downward traction  The posterior shoulder delivered with maternal expulsive forces and the assistance of upward traction  The remainder of the fetus delivered spontaneously       Upon delivery, the infant was placed on the mothers abdomen and the cord was clamped and cut  Delayed cord clamping was accomplished  The infant was noted to cry spontaneously and was moving all extremities appropriately  There was no evidence for injury  Awaiting nurse resuscitators evaluated the   Arterial and venous cord blood gases and cord blood was collected for analysis  These were promptly sent to the lab  In the immediate post-partum, 30 units of IV pitocin was administered, and the uterus was noted to contract down well with massage and pitocin  The placenta delivered spontaneously at 1103 and was noted to have a centrally inserted 3 vessel cord  The vagina, cervix, perineum, and rectum were inspected and there was noted to be bilateral periurethral lacerations  Pressure was applied for 60 seconds  The lacerations were observed to be hemostatic and were not repaired    At the conclusion of the procedure, all needle, sponge, and instrument counts were noted to be correct  Patient tolerated the procedure well and was allowed to recover in labor and delivery room with family and  before being transferred to the post-partum floor  The attending was present and participated in all key portions of the case          Lorena West MD  2017  11:16 AM

## 2017-12-08 NOTE — PLAN OF CARE
JANENE Attending Zoltan Alamo):     Called by Dr Genaro Germain for opinion regarding this patient, Ms Gus Paez, a 19 y/o  @ 38w1d, with persistent decreased fetal movement for several days  She was evaluated in triage today for the same complaint with reassuring testing however, by report, after leaving the ceter, decreased movement persisted and the fetus has per maternal perception moved minimally since departing  I support induction of labor in this setting  Maternal perception of movement is an important marker of fetal well-being and the risks of an early term delivery need to be weighed against the risk of stillbirth and it is my clinical impression that the benefits of delivery outweigh the risks in this setting  Please do not hesitate to contact me with any further questions or concerns      Sincerely,    Amanda Mayo MD  Attending Physician, [facility]

## 2017-12-08 NOTE — LACTATION NOTE
This note was copied from a baby's chart  CONSULT - LACTATION  Baby Boy Neri Faster 0 days male MRN: [id number]    [facility] NURSERY Room / Bed: L&D 326(N)/L&D 326(N) Encounter: [id number]    Maternal Information     MOTHER:  Aaliyah Gomez  Maternal Age: 20 y o    OB History: #: 1, Date: 13, Sex: Male, Weight: None, GA: 40w0d, Delivery: None, Apgar1: None, Apgar5: None, Living: Living, Birth Comments: None    #: 2, Date: 17, Sex: Male, Weight: 3260 g (7 lb 3 oz), GA: 38w2d, Delivery: Vaginal, Spontaneous Delivery, Apgar1: 8, Apgar5: 9, Living: Living, Birth Comments: None   Previouse breast reduction surgery? No    Lactation history:   Has patient previously breast fed: Yes   How long had patient previously breast fed: 1 month but was giving formula   Previous breast feeding complications: Low milk supply     Past Surgical History:   Procedure Laterality Date    TONSILLECTOMY         Birth information:  YOB: 2018   Time of birth: 11:00 AM   Sex: male   Delivery type: Vaginal, Spontaneous Delivery   Birth Weight: 3260 g (7 lb 3 oz)   Percent of Weight Change: 0%     Gestational Age: 36w4d   [unfilled]    Assessment     Breast and nipple assessment: did not assess at this time    [place] Assessment: did not assess at this time    Feeding assessment: feeding well  LATCH:  Latch: Audible Swallowing:     Type of Nipple:     Comfort (Breast/Nipple):     Hold (Positioning):     LATCH Score:            Feeding recommendations:  breast feed on demand     Breastfeeding booklets given and reviewed with mother  Mother verbalized breastfeeding is going well and this baby has latched well  Enc to call for assistance with positioning, latch and as needed,phone # given  Inpatient and outpatient supports available to mom  No family here at present  Mom tired from delivery, baby sleeping at present       Arsenio Nelson RN 2017 3:37 PM

## 2017-12-08 NOTE — ANESTHESIA PROCEDURE NOTES
Epidural Block    Patient location during procedure: OB  Start time: 12/8/2017 8:39 AM  Staffing  Anesthesiologist: Nataliya Shea  Preanesthetic Checklist  Completed: patient identified, surgical consent, pre-op evaluation, timeout performed, IV checked, risks and benefits discussed and monitors and equipment checked  Epidural  Patient position: sitting  Prep: Betadine  Patient monitoring: frequent blood pressure checks  Approach: midline  Injection technique: KAT saline  Needle  Needle type: Tuohy   Needle gauge: 18 G  Catheter type: side hole  Catheter size: 20 G  Catheter at skin depth: 8 cm  Test dose: negative and lidocaine 1 5% with epinephrine 1-to-200,000negative aspiration for CSF, negative aspiration for heme and no paresthesia on injection  patient tolerated the procedure well with no immediate complications

## 2017-12-09 RX ORDER — IBUPROFEN 600 MG/1
600 TABLET ORAL EVERY 6 HOURS PRN
Status: DISCONTINUED | OUTPATIENT
Start: 2017-12-09 | End: 2017-12-10 | Stop reason: HOSPADM

## 2017-12-09 RX ADMIN — DOCUSATE SODIUM 100 MG: 100 CAPSULE, LIQUID FILLED ORAL at 17:24

## 2017-12-09 RX ADMIN — IBUPROFEN 600 MG: 600 TABLET, FILM COATED ORAL at 19:15

## 2017-12-09 RX ADMIN — IBUPROFEN 600 MG: 600 TABLET, FILM COATED ORAL at 11:07

## 2017-12-09 RX ADMIN — DOCUSATE SODIUM 100 MG: 100 CAPSULE, LIQUID FILLED ORAL at 07:28

## 2017-12-09 RX ADMIN — OXYCODONE HYDROCHLORIDE AND ACETAMINOPHEN 1 TABLET: 5; 325 TABLET ORAL at 03:34

## 2017-12-09 NOTE — PLAN OF CARE
Problem: Knowledge Deficit  Goal: Patient/family/caregiver demonstrates understanding of disease process, treatment plan, medications, and discharge instructions  Complete learning assessment and assess knowledge base  Interventions:  - Provide teaching at level of understanding  - Provide teaching via preferred learning methods   Outcome: Completed Date Met: 12/09/17      Problem: PAIN - ADULT  Goal: Verbalizes/displays adequate comfort level or baseline comfort level  Interventions:  - Encourage patient to monitor pain and request assistance  - Assess pain using appropriate pain scale  - Administer analgesics based on type and severity of pain and evaluate response  - Implement non-pharmacological measures as appropriate and evaluate response  - Consider cultural and social influences on pain and pain management  - Notify physician/advanced practitioner if interventions unsuccessful or patient reports new pain   Outcome: Completed Date Met: 12/09/17      Problem: INFECTION - ADULT  Goal: Absence or prevention of progression during hospitalization  INTERVENTIONS:  - Assess and monitor for signs and symptoms of infection  - Monitor lab/diagnostic results  - Monitor all insertion sites, i e  indwelling lines, tubes, and drains  - Monitor endotracheal (as able) and nasal secretions for changes in amount and color  - [place] appropriate cooling/warming therapies per order  - Administer medications as ordered  - Instruct and encourage patient and family to use good hand hygiene technique  - Identify and instruct in appropriate isolation precautions for identified infection/condition   Outcome: Completed Date Met: 12/09/17      Problem: SAFETY ADULT  Goal: Patient will remain free of falls  INTERVENTIONS:  - Assess patient frequently for physical needs  -  Identify cognitive and physical deficits and behaviors that affect risk of falls    -  [place] fall precautions as indicated by assessment   - Educate patient/family on patient safety including physical limitations  - Instruct patient to call for assistance with activity based on assessment  - Modify environment to reduce risk of injury  - Consider OT/PT consult to assist with strengthening/mobility   Outcome: Completed Date Met: 12/09/17    Goal: Maintain or return to baseline ADL function  INTERVENTIONS:  -  Assess patient's ability to carry out ADLs; assess patient's baseline for ADL function and identify physical deficits which impact ability to perform ADLs (bathing, care of mouth/teeth, toileting, grooming, dressing, etc )  - Assess/evaluate cause of self-care deficits   - Assess range of motion  - Assess patient's mobility; develop plan if impaired  - Assess patient's need for assistive devices and provide as appropriate  - Encourage maximum independence but intervene and supervise when necessary  ¯ Involve family in performance of ADLs  ¯ Assess for home care needs following discharge   ¯ Request OT consult to assist with ADL evaluation and planning for discharge  ¯ Provide patient education as appropriate   Outcome: Completed Date Met: 12/09/17    Goal: Maintain or return mobility status to optimal level  INTERVENTIONS:  - Assess patient's baseline mobility status (ambulation, transfers, stairs, etc )    - Identify cognitive and physical deficits and behaviors that affect mobility  - Identify mobility aids required to assist with transfers and/or ambulation (gait belt, sit-to-stand, lift, walker, cane, etc )  - [place] fall precautions as indicated by assessment  - Record patient progress and toleration of activity level on Mobility SBAR; progress patient to next Phase/Stage  - Instruct patient to call for assistance with activity based on assessment  - Request Rehabilitation consult to assist with strengthening/weightbearing, etc    Outcome: Completed Date Met: 12/09/17      Problem: DISCHARGE PLANNING  Goal: Discharge to home or other facility with appropriate resources  INTERVENTIONS:  - Identify barriers to discharge w/patient and caregiver  - Arrange for needed discharge resources and transportation as appropriate  - Identify discharge learning needs (meds, wound care, etc )  - Arrange for interpretive services to assist at discharge as needed  - Refer to Case Management Department for coordinating discharge planning if the patient needs post-hospital services based on physician/advanced practitioner order or complex needs related to functional status, cognitive ability, or social support system   Outcome: Completed Date Met: 17      Problem: POSTPARTUM  Goal: Experiences normal postpartum course  INTERVENTIONS:  - Monitor maternal vital signs  - Assess uterine involution and lochia   Outcome: Completed Date Met: 17    Goal: Appropriate maternal -  bonding  INTERVENTIONS:  - Identify family support  - Assess for appropriate maternal/infant bonding   -Encourage maternal/infant bonding opportunities  - Referral to  or  as needed   Outcome: Completed Date Met: 17    Goal: Establishment of infant feeding pattern  INTERVENTIONS:  - Assess breast/bottle feeding  - Refer to lactation as needed   Outcome: Completed Date Met: 17    Goal: Incision(s), wounds(s) or drain site(s) healing without S/S of infection  INTERVENTIONS  - Assess and document risk factors for skin impairment   - Assess and document dressing, incision, wound bed, drain sites and surrounding tissue  - Initiate Nutrition services consult and/or wound management as needed   Outcome: Completed Date Met: 17

## 2017-12-09 NOTE — PLAN OF CARE
Problem: Knowledge Deficit  Goal: Patient/family/caregiver demonstrates understanding of disease process, treatment plan, medications, and discharge instructions  Complete learning assessment and assess knowledge base    Interventions:  - Provide teaching at level of understanding  - Provide teaching via preferred learning methods   Outcome: Progressing      Problem: PAIN - ADULT  Goal: Verbalizes/displays adequate comfort level or baseline comfort level  Interventions:  - Encourage patient to monitor pain and request assistance  - Assess pain using appropriate pain scale  - Administer analgesics based on type and severity of pain and evaluate response  - Implement non-pharmacological measures as appropriate and evaluate response  - Consider cultural and social influences on pain and pain management  - Notify physician/advanced practitioner if interventions unsuccessful or patient reports new pain   Outcome: Progressing      Problem: INFECTION - ADULT  Goal: Absence or prevention of progression during hospitalization  INTERVENTIONS:  - Assess and monitor for signs and symptoms of infection  - Monitor lab/diagnostic results  - Monitor all insertion sites, i e  indwelling lines, tubes, and drains  - Monitor endotracheal (as able) and nasal secretions for changes in amount and color  - [place] appropriate cooling/warming therapies per order  - Administer medications as ordered  - Instruct and encourage patient and family to use good hand hygiene technique  - Identify and instruct in appropriate isolation precautions for identified infection/condition   Outcome: Not Progressing    Goal: Absence of fever/infection during neutropenic period  INTERVENTIONS:  - Monitor WBC  - Implement neutropenic guidelines   Outcome: Completed Date Met: 12/09/17  No neutropenia with this hospitalization    Problem: SAFETY ADULT  Goal: Patient will remain free of falls  INTERVENTIONS:  - Assess patient frequently for physical needs  - Identify cognitive and physical deficits and behaviors that affect risk of falls    -  [place] fall precautions as indicated by assessment   - Educate patient/family on patient safety including physical limitations  - Instruct patient to call for assistance with activity based on assessment  - Modify environment to reduce risk of injury  - Consider OT/PT consult to assist with strengthening/mobility   Outcome: Progressing    Goal: Maintain or return to baseline ADL function  INTERVENTIONS:  -  Assess patient's ability to carry out ADLs; assess patient's baseline for ADL function and identify physical deficits which impact ability to perform ADLs (bathing, care of mouth/teeth, toileting, grooming, dressing, etc )  - Assess/evaluate cause of self-care deficits   - Assess range of motion  - Assess patient's mobility; develop plan if impaired  - Assess patient's need for assistive devices and provide as appropriate  - Encourage maximum independence but intervene and supervise when necessary  ¯ Involve family in performance of ADLs  ¯ Assess for home care needs following discharge   ¯ Request OT consult to assist with ADL evaluation and planning for discharge  ¯ Provide patient education as appropriate   Outcome: Progressing    Goal: Maintain or return mobility status to optimal level  INTERVENTIONS:  - Assess patient's baseline mobility status (ambulation, transfers, stairs, etc )    - Identify cognitive and physical deficits and behaviors that affect mobility  - Identify mobility aids required to assist with transfers and/or ambulation (gait belt, sit-to-stand, lift, walker, cane, etc )  - [place] fall precautions as indicated by assessment  - Record patient progress and toleration of activity level on Mobility SBAR; progress patient to next Phase/Stage  - Instruct patient to call for assistance with activity based on assessment  - Request Rehabilitation consult to assist with strengthening/weightbearing, etc  Outcome: Progressing      Problem: DISCHARGE PLANNING  Goal: Discharge to home or other facility with appropriate resources  INTERVENTIONS:  - Identify barriers to discharge w/patient and caregiver  - Arrange for needed discharge resources and transportation as appropriate  - Identify discharge learning needs (meds, wound care, etc )  - Arrange for interpretive services to assist at discharge as needed  - Refer to Case Management Department for coordinating discharge planning if the patient needs post-hospital services based on physician/advanced practitioner order or complex needs related to functional status, cognitive ability, or social support system   Outcome: Progressing      Problem: POSTPARTUM  Goal: Experiences normal postpartum course  INTERVENTIONS:  - Monitor maternal vital signs  - Assess uterine involution and lochia   Outcome: Progressing    Goal: Appropriate maternal -  bonding  INTERVENTIONS:  - Identify family support  - Assess for appropriate maternal/infant bonding   -Encourage maternal/infant bonding opportunities  - Referral to  or  as needed   Outcome: Progressing    Goal: Establishment of infant feeding pattern  INTERVENTIONS:  - Assess breast/bottle feeding  - Refer to lactation as needed   Outcome: Progressing    Goal: Incision(s), wounds(s) or drain site(s) healing without S/S of infection  INTERVENTIONS  - Assess and document risk factors for skin impairment   - Assess and document dressing, incision, wound bed, drain sites and surrounding tissue  - Initiate Nutrition services consult and/or wound management as needed   Outcome: Progressing

## 2017-12-09 NOTE — PROGRESS NOTES
Progress Note - OB/GYN   Elda Palma 20 y o  female MRN: [id number]  Unit/Bed#: L&D 307-01 Encounter: [id number]    Subjective/Objective   Chief Complaint: Postpartum Day #1 s/p     Subjective: Pain well controlled  Eating a regular diet without difficulty  Flatus Yes  Bowel movements are not yet  Voiding without difficulty  Ambulating Yes  Breastfeeding Yes  Lochia minimal  Denies chest pain, shortness of breath, leg pains  Objective:    Vitals: Blood pressure 108/67, pulse 79, temperature 98 5 °F (36 9 °C), temperature source Oral, resp  rate 16, height 5' (1 524 m), weight 72 6 kg (160 lb), last menstrual period 2017, SpO2 97 %, currently breastfeeding  ,Body mass index is 31 25 kg/m²  Intake/Output Summary (Last 24 hours) at 17 0710  Last data filed at 17 1930   Gross per 24 hour   Intake             1000 ml   Output              400 ml   Net              600 ml       Invasive Devices     Peripheral Intravenous Line            Peripheral IV 17 Left Forearm 1 day                Physical Exam:  GEN: Elda Palma appears well, alert and oriented x 3, pleasant and cooperative    HEART: regular rhythm, normal S1 and S2, no murmurs, clicks, gallops or rubs   LUNGS: clear to auscultation bilaterally; no wheezes, rales, or rhonchi   ABDOMEN: normal bowel sounds, soft, no tenderness, no distention  : Uterus firm at umbilicus nontender  EXTREMITIES: peripheral pulses normal; no clubbing, cyanosis, or edema    Labs:   Lab Results   Component Value Date    WBC 11 96 (H) 2017    HGB 10 9 (L) 2017    HCT 33 3 (L) 2017    MCV 78 (L) 2017     2017       Lab, Imaging and other studies: I have personally reviewed pertinent reports      MEDS:   Current Facility-Administered Medications   Medication Dose Route Frequency    acetaminophen (TYLENOL) tablet 650 mg  650 mg Oral Q4H PRN    benzocaine-menthol-lanolin-aloe (DERMOPLAST) 20-0 5 % topical spray 1 application  1 application Topical 4x Daily PRN    calcium carbonate (TUMS) chewable tablet 1,000 mg  1,000 mg Oral Daily PRN    diphenhydrAMINE (BENADRYL) tablet 25 mg  25 mg Oral Q6H PRN    docusate sodium (COLACE) capsule 100 mg  100 mg Oral BID    hydrocortisone 1 % cream 1 application  1 application Topical PRN    ibuprofen (MOTRIN) tablet 600 mg  600 mg Oral Q6H PRN    measles-mumps-rubella (M-M-R II) subcutaneous injection 0 5 mL  0 5 mL Subcutaneous Once    ondansetron (ZOFRAN) injection 4 mg  4 mg Intravenous Q8H PRN    oxyCODONE-acetaminophen (PERCOCET) 5-325 mg per tablet 1 tablet  1 tablet Oral Q4H PRN    oxyCODONE-acetaminophen (PERCOCET) 5-325 mg per tablet 2 tablet  2 tablet Oral Q4H PRN    simethicone (MYLICON) chewable tablet 80 mg  80 mg Oral 4x Daily PRN    witch hazel-glycerin (TUCKS) topical pad 1 pad  1 pad Topical Q4H PRN       Assessment:  Postpartum Day #1 s/p     Plan:  Continue routine postpartum care  Encourage ambulation    Rubella non-immune: ordered MMR postpartum    Viet Rocha MD  PGY-III

## 2017-12-10 VITALS
BODY MASS INDEX: 31.41 KG/M2 | RESPIRATION RATE: 18 BRPM | TEMPERATURE: 98.2 F | HEART RATE: 77 BPM | DIASTOLIC BLOOD PRESSURE: 59 MMHG | OXYGEN SATURATION: 98 % | WEIGHT: 160 LBS | HEIGHT: 60 IN | SYSTOLIC BLOOD PRESSURE: 107 MMHG

## 2017-12-10 RX ORDER — IBUPROFEN 600 MG/1
600 TABLET ORAL EVERY 6 HOURS PRN
Qty: 30 TABLET | Refills: 0
Start: 2017-12-10 | End: 2017-12-13

## 2017-12-10 RX ORDER — ACETAMINOPHEN 325 MG/1
TABLET ORAL
Qty: 30 TABLET | Refills: 0
Start: 2017-12-10 | End: 2017-12-13

## 2017-12-10 RX ADMIN — DOCUSATE SODIUM 100 MG: 100 CAPSULE, LIQUID FILLED ORAL at 11:23

## 2017-12-10 RX ADMIN — IBUPROFEN 600 MG: 600 TABLET, FILM COATED ORAL at 01:27

## 2017-12-10 RX ADMIN — IBUPROFEN 600 MG: 600 TABLET, FILM COATED ORAL at 11:23

## 2017-12-10 NOTE — PROGRESS NOTES
Progress Note - OB/GYN   Giovanni Valdez 20 y o  female MRN: [id number]  Unit/Bed#: L&D 307-01 Encounter: [id number]    Assessment:  Post partum Day #2 s/p , stable, baby in room    Plan:  1) Rubella non-immune   MMR postpartum ordered, needs to be given prior to discharge  2) Continue routine post partum care  3) Encourage ambulation  4) Encourage breastfeeding  5) Anticipate discharge today     Subjective/Objective   Chief Complaint:     Post delivery  Patient doing well and ready for discharge    Subjective:     Pain: yes, cramping, improved with meds  Tolerating PO: yes  Voiding: yes  Flatus: yes  BM: yes  Ambulating: yes  Breastfeeding:  yes  Chest pain: no  Shortness of breath: no  Leg pain: no  Lochia: minimal    Objective:     Vitals: /63   Pulse 72   Temp 98 6 °F (37 °C) (Oral)   Resp 18   Ht 5' (1 524 m)   Wt 72 6 kg (160 lb)   LMP 2017   SpO2 98%   Breastfeeding? Yes   BMI 31 25 kg/m²     No intake or output data in the 24 hours ending 12/10/17 0726    Lab Results   Component Value Date    WBC 11 96 (H) 2017    HGB 10 9 (L) 2017    HCT 33 3 (L) 2017    MCV 78 (L) 2017     2017       Physical Exam:     Gen: AAOx3, NAD  CV: RRR  Lungs: CTA b/l  Abd: Soft, non-tender, non-distended, no rebound or guarding  Uterine fundus firm and non-tender, 1 cm below the umbilicus     Ext: Non tender    Mayte Guardado MD  12/10/2017  7:26 AM

## 2017-12-10 NOTE — PROGRESS NOTES
D/C teaching completed with pt as per unit protocol  Pt stated she will watch videos  Questions answered and pt verb understanding

## 2017-12-11 NOTE — CASE MANAGEMENT
Notification of Maternity Inpatient Admission/Maternity Inpatient Authorization Request  This is a Notification of Maternity Inpatient Admission/Maternity Inpatient Authorization Request to our facility [facility]  Please be advised that this patient is currently in our facility under Inpatient Status  Below you will find the Birth/Cambria Summary, Attending Physician and Facilitys information including NPI# and contact for the Utilization  assigned to the [facility] where the patient is receiving services  Please feel free to contact the Utilization Review Department with any questions  Mothers Information:  Javier Hong  MRN: [id number]  YOB: 1995  Admission Date: 2017  8:53 PM  Discharge Date: 12/10/2017 12:05 PM  Disposition: Home/Self Care  Admitting Diagnosis:   O80 VAGINAL DELIVERY  Decreased fetal movement affecting management of mother, antepartum [O36 8190]   Information:  Estimated Date of Delivery: 17  Information for the patient's :  Matias Michelle [[id number]]      Delivery Information:  Sex: male  Delivered 2017 11:00 AM by Vaginal, Spontaneous Delivery; Gestational Age: 36w4d     Measurements:  Weight: 7 lb 3 oz (3260 g);   Height: 19"    APGAR 1 minute 5 minutes 10 minutes   Totals: 8 9      OB History      Para Term  AB Living    2 2 2     2    SAB TAB Ectopic Multiple Live Births          0 2        Attending Physician:  Dr Fern Jerez  NPI: [id number]  Address: [place] [address] [address] [facility]   Phone Number: [phone]   Fax Number: [phone]     Facility: [place] [address] [facility]  [phone]  Tax ID: [id number]  NPI: [id number]    [address] in the [facility] by Cb Corbin for 2017  Network Utilization Review Department  Phone: [phone]; Fax [phone]  ATTENTION: The Network Utilization Review Department is now centralized for our 7 Facilities  Make a note that we have a new phone and fax numbers for our Department  Please call with any questions or concerns to [phone] and carefully follow the prompts so that you are directed to the right person  All voicemails are confidential  Fax any determinations, approvals, denials, and requests for initial or continue stay review clinical to [phone]  **Due to HIGH CALL volume, it would be easier if you could please send daily logs  This will expedite your requests and in part, help us provide discharge notifications faster   **

## 2017-12-13 ENCOUNTER — HOSPITAL ENCOUNTER (EMERGENCY)
Facility: HOSPITAL | Age: 22
Discharge: HOME | End: 2017-12-13
Attending: EMERGENCY MEDICINE | Admitting: EMERGENCY MEDICINE
Payer: COMMERCIAL

## 2017-12-13 VITALS
DIASTOLIC BLOOD PRESSURE: 61 MMHG | SYSTOLIC BLOOD PRESSURE: 102 MMHG | OXYGEN SATURATION: 98 % | HEART RATE: 82 BPM | TEMPERATURE: 98.6 F | RESPIRATION RATE: 16 BRPM

## 2017-12-13 DIAGNOSIS — H10.9 CONJUNCTIVITIS: Primary | ICD-10-CM

## 2017-12-13 PROCEDURE — 99282 EMERGENCY DEPT VISIT SF MDM: CPT

## 2017-12-13 RX ORDER — TOBRAMYCIN 3 MG/ML
1 SOLUTION/ DROPS OPHTHALMIC
Qty: 1 BOTTLE | Refills: 0 | Status: SHIPPED | OUTPATIENT
Start: 2017-12-13 | End: 2018-01-02

## 2017-12-13 NOTE — DISCHARGE INSTRUCTIONS

## 2017-12-13 NOTE — ED PROVIDER NOTES
History  Chief Complaint   Patient presents with    Eye Drainage     pt was sleeping and woke up with drainage to left eye   son also being seen for same  History provided by:  Patient   used: No    Eye Problem   Location:  Left eye  Quality:  Aching  Severity:  Mild  Onset quality:  Gradual  Duration:  2 days  Timing:  Intermittent  Progression:  Unchanged  Chronicity:  New  Context: not contact lens problem    Relieved by:  Nothing  Worsened by:  Nothing  Ineffective treatments:  None tried  Associated symptoms: crusting, discharge and tearing    Associated symptoms: no decreased vision, no double vision, no headaches, no nausea, no swelling and no vomiting    Risk factors comment:  Recent delivery, no vaginal/pelvic infections      Prior to Admission Medications   Prescriptions Last Dose Informant Patient Reported? Taking? albuterol (PROVENTIL HFA,VENTOLIN HFA) 90 mcg/act inhaler   Yes Yes   Sig: Inhale 2 puffs every 6 (six) hours as needed for wheezing      Facility-Administered Medications: None       Past Medical History:   Diagnosis Date    Anemia     Asthma        Past Surgical History:   Procedure Laterality Date    TONSILLECTOMY         History reviewed  No pertinent family history  I have reviewed and agree with the history as documented  Social History   Substance Use Topics    Smoking status: Never Smoker    Smokeless tobacco: Never Used    Alcohol use No        Review of Systems   Constitutional: Negative for activity change, chills and fever  HENT: Negative for facial swelling, sore throat and trouble swallowing  Eyes: Positive for discharge  Negative for double vision, pain and visual disturbance  Respiratory: Negative for cough, chest tightness and shortness of breath  Cardiovascular: Negative for chest pain and leg swelling  Gastrointestinal: Negative for abdominal pain, blood in stool, diarrhea, nausea and vomiting     Genitourinary: Negative for dysuria and flank pain  Musculoskeletal: Negative for back pain, neck pain and neck stiffness  Skin: Negative for pallor and rash  Allergic/Immunologic: Negative for environmental allergies and immunocompromised state  Neurological: Negative for dizziness and headaches  Hematological: Negative for adenopathy  Does not bruise/bleed easily  Psychiatric/Behavioral: Negative for agitation and behavioral problems  All other systems reviewed and are negative  Physical Exam  ED Triage Vitals [12/13/17 1647]   Temperature Pulse Respirations Blood Pressure SpO2   98 6 °F (37 °C) 82 16 102/61 98 %      Temp Source Heart Rate Source Patient Position - Orthostatic VS BP Location FiO2 (%)   Oral -- -- -- --      Pain Score       2           Orthostatic Vital Signs  Vitals:    12/13/17 1647   BP: 102/61   Pulse: 82       Physical Exam   Constitutional: She is oriented to person, place, and time  She appears well-developed and well-nourished  No distress  HENT:   Head: Normocephalic and atraumatic  Left eye yellow green discharge with eye lid matting   Eyes: EOM are normal    Neck: Normal range of motion  Neck supple  Cardiovascular: Normal rate, regular rhythm, normal heart sounds and intact distal pulses  Pulmonary/Chest: Effort normal and breath sounds normal    Abdominal: Soft  Bowel sounds are normal  There is no tenderness  There is no rebound and no guarding  Musculoskeletal: Normal range of motion  Neurological: She is alert and oriented to person, place, and time  Skin: Skin is warm and dry  Psychiatric: She has a normal mood and affect  Nursing note and vitals reviewed        ED Medications  Medications - No data to display    Diagnostic Studies  Results Reviewed     None                 No orders to display              Procedures  Procedures       Phone Contacts  ED Phone Contact    ED Course  ED Course                                MDM  Number of Diagnoses or Management Options  Diagnosis management comments: Left eye conjunctivitis, recent delivery, no vaginal/pelvic infections  We will treat for Conjunctivitis  Follow up with PCP  CritCare Time    Disposition  Final diagnoses:   Conjunctivitis     Time reflects when diagnosis was documented in both MDM as applicable and the Disposition within this note     Time User Action Codes Description Comment    12/13/2017  5:09 PM Ashley Rodriguez Add [H10 9] Conjunctivitis       ED Disposition     ED Disposition Condition Comment    Discharge  [address] [facility] discharge to home/self care  Condition at discharge: Stable        Follow-up Information    None       Patient's Medications   Discharge Prescriptions    TOBRAMYCIN (TOBREX) 0 3 % SOLN    Administer 1 drop to both eyes every 4 (four) hours while awake       Start Date: 12/13/2017End Date: --       Order Dose: 1 drop       Quantity: 1 Bottle    Refills: 0     No discharge procedures on file      ED Provider  Electronically Signed by           Faby Sesay MD  12/13/17 5098

## 2017-12-15 ENCOUNTER — GENERIC CONVERSION - ENCOUNTER (OUTPATIENT)
Dept: OTHER | Facility: OTHER | Age: 22
End: 2017-12-15

## 2018-01-02 ENCOUNTER — HOSPITAL ENCOUNTER (EMERGENCY)
Facility: HOSPITAL | Age: 23
Discharge: HOME | End: 2018-01-02
Attending: EMERGENCY MEDICINE | Admitting: EMERGENCY MEDICINE
Payer: COMMERCIAL

## 2018-01-02 VITALS
BODY MASS INDEX: 28.51 KG/M2 | TEMPERATURE: 97.8 F | DIASTOLIC BLOOD PRESSURE: 58 MMHG | HEART RATE: 65 BPM | RESPIRATION RATE: 16 BRPM | WEIGHT: 146 LBS | OXYGEN SATURATION: 100 % | SYSTOLIC BLOOD PRESSURE: 112 MMHG

## 2018-01-02 DIAGNOSIS — M54.50 ACUTE LOW BACK PAIN: Primary | ICD-10-CM

## 2018-01-02 LAB
BACTERIA UR QL AUTO: ABNORMAL /HPF
BILIRUB UR QL STRIP: NEGATIVE
CLARITY UR: CLEAR
COLOR UR: YELLOW
GLUCOSE UR STRIP-MCNC: NEGATIVE MG/DL
HGB UR QL STRIP.AUTO: ABNORMAL
KETONES UR STRIP-MCNC: NEGATIVE MG/DL
LEUKOCYTE ESTERASE UR QL STRIP: ABNORMAL
NITRITE UR QL STRIP: NEGATIVE
NON-SQ EPI CELLS URNS QL MICRO: ABNORMAL /HPF
PH UR STRIP.AUTO: 6.5 [PH] (ref 4.5–8)
PROT UR STRIP-MCNC: NEGATIVE MG/DL
RBC #/AREA URNS AUTO: ABNORMAL /HPF
SP GR UR STRIP.AUTO: 1.02 (ref 1–1.03)
UROBILINOGEN UR QL STRIP.AUTO: 0.2 E.U./DL
WBC #/AREA URNS AUTO: ABNORMAL /HPF

## 2018-01-02 PROCEDURE — 99283 EMERGENCY DEPT VISIT LOW MDM: CPT

## 2018-01-02 PROCEDURE — 81001 URINALYSIS AUTO W/SCOPE: CPT

## 2018-01-02 PROCEDURE — 81002 URINALYSIS NONAUTO W/O SCOPE: CPT | Performed by: PHYSICIAN ASSISTANT

## 2018-01-02 PROCEDURE — 96372 THER/PROPH/DIAG INJ SC/IM: CPT

## 2018-01-02 RX ORDER — LIDOCAINE 50 MG/G
1 PATCH TOPICAL DAILY
Qty: 30 PATCH | Refills: 0 | Status: SHIPPED | OUTPATIENT
Start: 2018-01-02 | End: 2018-09-20

## 2018-01-02 RX ORDER — IBUPROFEN 600 MG/1
600 TABLET ORAL EVERY 6 HOURS PRN
Qty: 30 TABLET | Refills: 0 | Status: SHIPPED | OUTPATIENT
Start: 2018-01-02 | End: 2020-01-07 | Stop reason: ALTCHOICE

## 2018-01-02 RX ORDER — LIDOCAINE 50 MG/G
1 PATCH TOPICAL ONCE
Status: DISCONTINUED | OUTPATIENT
Start: 2018-01-02 | End: 2018-01-02 | Stop reason: HOSPADM

## 2018-01-02 RX ORDER — KETOROLAC TROMETHAMINE 30 MG/ML
15 INJECTION, SOLUTION INTRAMUSCULAR; INTRAVENOUS ONCE
Status: COMPLETED | OUTPATIENT
Start: 2018-01-02 | End: 2018-01-02

## 2018-01-02 RX ADMIN — LIDOCAINE 1 PATCH: 50 PATCH CUTANEOUS at 19:39

## 2018-01-02 RX ADMIN — KETOROLAC TROMETHAMINE 15 MG: 30 INJECTION, SOLUTION INTRAMUSCULAR at 19:38
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
## 2018-01-03 NOTE — DISCHARGE INSTRUCTIONS
Warm compresses to the area  Medication as directed  FU with your family doctor, return to the ED for worsening symptoms  Also FU with your OBGYN  Acute Low Back Pain, Ambulatory Care   GENERAL INFORMATION:   Acute low back pain  is discomfort in your lower back area that lasts for less than 12 weeks  The word acute is used to describe pain that starts suddenly, worsens quickly, and lasts for a short time  Common symptoms include the following:   · Back stiffness or spasms    · Pain down the back or side of one leg    · Holding yourself in an unusual position or posture to decrease your back pain    · Not being able to find a sitting position that is comfortable    · Slow increase in your pain for 24 to 48 hours after you stress your back    · Tenderness on your lower back or severe pain when you move your back  Seek immediate care for the following symptoms:   · Severe pain    · Sudden stiffness and heaviness in both buttocks down to both legs    · Numbness or weakness in one leg, or pain in both legs    · Numbness in your genital area or across your lower back    · Unable to control your urine or bowel movements  Treatment for acute low back pain  may include any of the following:  · Medicines:      ¨ NSAIDs  help decrease swelling and pain or fever  This medicine is available with or without a doctor's order  NSAIDs can cause stomach bleeding or kidney problems in certain people  If you take blood thinner medicine, always ask your healthcare provider if NSAIDs are safe for you  Always read the medicine label and follow directions  ¨ Muscle relaxers  help decrease muscle spasms pain  ¨ Prescription pain medicine  may be given  Ask how to take this medicine safely  · Surgery  may be needed if your pain is severe and other treatments do not work  Surgery may be needed for conditions of the lumbar spine, such as herniated disc or spinal stenosis  Manage your symptoms:   · Sleep on a firm mattress    If you do not have a firm mattress, have someone move your mattress to the floor for a few days  A piece of plywood under your mattress can also help make it firmer  · Apply ice  on your lower back for 15 to 20 minutes every hour or as directed  Use an ice pack, or put crushed ice in a plastic bag  Cover it with a towel  Ice helps prevent tissue damage and decreases swelling and pain  You can alternate ice and heat  · Apply heat  on your lower back for 20 to 30 minutes every 2 hours for as many days as directed  Heat helps decrease pain and muscle spasms  · Go to physical therapy  A physical therapist teaches you exercises to help improve movement and strength, and to decrease pain  Prevent acute low back pain:   · Use proper body mechanics  ¨ Bend at the hips and knees when you  objects  Do not bend from the waist  Use your leg muscles as you lift the load  Do not use your back  Keep the object close to your chest as you lift it  Try not to twist or lift anything above your waist     ¨ Change your position often when you stand for long periods of time  Rest one foot on a small box or footrest, and then switch to the other foot often  ¨ Try not to sit for long periods of time  When you do, sit in a straight-backed chair with your feet flat on the floor  Never reach, pull, or push while you are sitting  · Exercise regularly  Warm up before you exercise  Do exercises that strengthen your back muscles  Ask about the best exercise plan for you  · Maintain a healthy weight  Ask your healthcare provider how much you should weigh  Ask him to help you create a weight loss plan if you are overweight  Follow up with your healthcare provider as directed:  Return for a follow-up visit if you still have pain after 1 to 3 weeks of treatment  You may need to visit an orthopedist if your back pain lasts more than 6 to 12 weeks   Write down your questions so you remember to ask them during your visits  CARE AGREEMENT:   You have the right to help plan your care  Learn about your health condition and how it may be treated  Discuss treatment options with your caregivers to decide what care you want to receive  You always have the right to refuse treatment  The above information is an  only  It is not intended as medical advice for individual conditions or treatments  Talk to your doctor, nurse or pharmacist before following any medical regimen to see if it is safe and effective for you  © 2014 [address] is for End User's use only and may not be sold, redistributed or otherwise used for commercial purposes  All illustrations and images included in CareNotes® are the copyrighted property of [employer]  or Cb Corbin

## 2018-01-09 NOTE — MISCELLANEOUS
Message   Recorded as Task   Date: 06/13/2016 01:59 PM, Created By: Dariana Littlejohn   Task Name: Go to Note   Assigned To: [facility]   Regarding Patient: Ela Montero, Status: Active   Comment:    Avtar Lu - 13 Jun 2016 1:59 PM     TASK CREATED  Please advise patient while she has mild dysplasia, because of her age, a repeat Pap smear next year is all that is required  Active Problems    1  Encounter for initial prescription of contraceptives (V25 02) (Z30 019)   2  Encounter for routine gynecological examination with Papanicolaou smear of cervix   (V72 31,V76 2) (Z01 419)   3  Encounter for routine postpartum follow-up (V24 2) (Z39 2)   4  Denied: History of self breast exam   5  Pregnancy (V22 2) (Z33 1)   6  Screening for STDs (sexually transmitted diseases) (V74 5) (Z11 3)    Current Meds   1  Ventolin  (90 Base) MCG/ACT Inhalation Aerosol Solution; Therapy: (Recorded:[id number]) to Recorded    Allergies    1  No Known Drug Allergies    2   FRUIT    Signatures   Electronically signed by : Jody Juárez, ; Jun 14 2016 11:05AM EST                       (Author)

## 2018-01-09 NOTE — PROGRESS NOTES
2017         RE: Amadeo Appl                           To: [facility] ([facility])   MR#: [id number]                                    [address]   : AUG 48 Javed Brown, [address] [place] [address] Street: [id number]                             Fax: [phone]   (Exam #: [id number])      The LMP of this 24year old,  G2, P1-0-0-1 patient was unknown, her   working JILLIAN is DEC 20 2017 and the current gestational age is 17 weeks 5   days by [address] [place] [address]  A sonographic examination was performed on 2017 using real time equipment  The ultrasound examination was   performed using abdominal technique  Earliest ultrasound found in her record: 17  12w1d  17EDD   Dorothy Brady is on prenatal vitamins and uses an albuterol inhaler as needed   for asthma  She denies any allergies to medications  She denies any use of   cigarettes, alcohol or illicit drugs  Her past medical history is   significant for mild asthma treated with Albuterol PRN  She uses her   inhaler on a rare occasion  Her surgical history includes a tonsillectomy  Her OB history includes a prior normal term vaginal delivery that weighed   6 lbs  8 oz  and she denies any first generation family history of   hypertension, diabetes, thrombosis or congenital anomalies  She is here   today for the sequential screen  Multiple longitudinal and transverse sections revealed a lagos   intrauterine pregnancy with the fetus in transverse presentation  The   placenta is posterior in implantation, grade 0 in appearance        Cardiac motion was observed at 141 bpm       INDICATIONS      first trimester genetic screening      Exam Types      sequential screen      RESULTS      Fetus # 1 of 1   Transverse presentation   Fetal growth appeared normal      MEASUREMENTS (* Included In Average GA)      CRL              7 3 cm        13 weeks 1 day *   Nuchal Trans    1 80 mm      THE AVERAGE GESTATIONAL AGE is 13 weeks 1 day +/- 7 days  ANATOMY COMMENTS      Anatomic detail is limited at this gestational age  The yolk sac was not   noted  The fetal cranium appeared normal in shape and the nuchal   translucency was normal in size (1 8mm)  The nasal bone appears to be   present  The intracranial anatomy was unremarkable  Evaluation of the   spine revealed no obvious evidence for a neural tube defect  Anatomy of   the fetal thorax appeared within normal limits  The cardiac rhythm was   regular  Within the abdomen, stomach & bladder were visualized and the   abdominal wall appeared intact  A three vessel cord appears to be present  Active movement of the fetal body & extremities was seen  There is no   suspicion of a subchorionic bleed  The placental cord insertion was   normal    There is no suspicion of a uterine myoma  Free fluid is not seen   in the posterior cul-de-sac  ADNEXA      The left ovary appeared normal and measured 2 4 x 2 4 x 2 0 cm with a   volume of 6 0 cc  The right ovary appeared normal and measured 2 4 x 2 4 x   1 4 cm with a volume of 4 2 cc  AMNIOTIC FLUID         Largest Vertical Pocket = 3 3 cm   Amniotic Fluid: Normal      IMPRESSION      Jay IUP   13 weeks and 1 day by this ultrasound  (JILLIAN=DEC 24 2017)   13 weeks and 5 days by 1st Tri Sono  (JILLIAN=DEC 20 2017)   Transverse presentation   Fetal growth appeared normal   Regular fetal heart rate of 141 bpm   Posterior placenta      Alan Folds      Dear Dr Flaco Corley      Thank you for requesting a  consultation on your patient Ms Gomez for the following indications: sequential screen      The patient was informed of the findings and counseled about the   limitations of the exam in detecting all forms of fetal congenital   abnormalities  She denies any vaginal bleeding or uterine cramping/contractions        Exam shows she is comfortable and her abdomen is non tender  Today's ultrasound findings and suggested follow-up were discussed in   detail with the patient  The Sequential Screen was discussed in detail,   including the sensitivities for detection of Down syndrome, Trisomy 18,   and open neural tube defects  The patient was given a lab slip to have   blood drawn for hCG and DIMPLE-A to complete the initial component of the   Sequential Screen  Results should be available within one week  Follow up recommended:   1  Follow-up multiple marker serum screening at 16-18 weeks' gestation is   recommended to complete the Sequential Screen  2  Fetal Level II ultrasound imaging is recommended at 19-20 weeks'   gestation locally  DIOMEDES Anderson M D     Maternal-Fetal Medicine   Electronically signed 06/19/17 12:44

## 2018-01-10 NOTE — MISCELLANEOUS
Message   Recorded as Task   Date: 10/19/2017 11:24 AM, Created By: Saumya Cid   Task Name: Miscellaneous   Assigned To: Kwesi Powell   Regarding Patient: Irma Shannon, Status: Active   CommentBenigno Deejay - 19 Oct 2017 11:24 AM     TASK CREATED  I believe this was a 1 hour Glucola, please confirm that  Please inform the patient that this is normal  Thanks   Gretchen Pineda - 20 Oct 2017 8:30 AM     TASK EDITED  LM of normal results        Active Problems    1  Encounter for fetal anatomic survey (V28 81) (Z36 89)   2  Encounter for screening for risk of pre-term labor (V28 82) (Z36 86)   3  Low grade squamous intraepithelial lesion (LGSIL) on cervical Pap smear (795 03)   (L04 484)   4  Pregnancy, obstetrical care (V22 1) (Z34 90)   5  Vaginal candidiasis (112 1) (B37 3)    Current Meds   1  PreNatal  MG Oral Capsule; Therapy: 99TLJ7650 to Recorded   2  Ventolin  (90 Base) MCG/ACT Inhalation Aerosol Solution; Therapy: (Recorded:31Bav0683) to Recorded    Allergies    1  No Known Drug Allergies    2   FRUIT    Signatures   Electronically signed by : Noel Talyor, ; Oct 20 2017  8:30AM EST                       (Author)

## 2018-01-10 NOTE — MISCELLANEOUS
Message   Date: 20 Jul 2016 7:17 AM EST, Recorded By: Tj Love For: Jacklyn Pate, Self   Phone: [phone] (Home)   Reason: Other   per [facility]  pt has mild dysplasia  her pap will need repeated at her next yearly  pt did not answer many attempts to call her and a letter was sent   pt never called back  Active Problems    1  Encounter for initial prescription of contraceptives (V25 02) (Z30 019)   2  Encounter for routine gynecological examination with Papanicolaou smear of cervix   (V72 31,V76 2) (Z01 419)   3  Encounter for routine postpartum follow-up (V24 2) (Z39 2)   4  Denied: History of self breast exam   5  Pregnancy (V22 2) (Z33 1)   6  Screening for STDs (sexually transmitted diseases) (V74 5) (Z11 3)    Current Meds   1  Ventolin  (90 Base) MCG/ACT Inhalation Aerosol Solution; Therapy: (Recorded:55Caf8604) to Recorded    Allergies    1  No Known Drug Allergies    2   FRUIT    Signatures   Electronically signed by : Griselda Shiley, ; Jul 20 2016  7:19AM EST                       (Author)

## 2018-01-10 NOTE — MISCELLANEOUS
Message   Recorded as Task   Date: 04/26/2017 03:03 PM, Created By: Dax Tirado   Task Name: Miscellaneous   Assigned To: Keshawn Escalante   Regarding Patient: Luis Camacho, Status: Active   CommentLorri Edgar - 26 Apr 2017 3:03 PM     TASK CREATED  HCG is 20,000+, recommend prenatal care   Gretchen Pineda - 27 Apr 2017 9:25 AM     TASK EDITED  PT HAD SPOTTING THE END OF mARCH, WAS  [address] PREG TEST    WILL USE THIS AS HER LMP    PT WILL MAKE APPT BEGINNING OF JUNE FOR HER 9 WEEK APPTS  Angel Hazel Active Problems    1  Contraception (V25 9) (Z30 9)   2  Encounter for initial prescription of contraceptives (V25 02) (Z30 019)   3  Encounter for routine gynecological examination with Papanicolaou smear of cervix   (V72 31,V76 2) (Z01 419)   4  Encounter for routine postpartum follow-up (V24 2) (Z39 2)   5  Denied: History of self breast exam   6  Irregular menstrual cycle (626 4) (N92 6)   7  Low grade squamous intraepithelial lesion (LGSIL) on cervical Pap smear (795 03)   (R87 612)   8  Nexplanon removal (V25 43) (Z30 46)   9  Pregnancy (V22 2) (Z33 1)   10  Screening for STDs (sexually transmitted diseases) (V74 5) (Z11 3)    Current Meds   1  Norethin Ace-Eth Estrad-FE 1-20 MG-MCG Oral Tablet (Loestrin Fe 1/20); TAKE 1   TABLET DAILY; Therapy: 95RLX7039 to (Evaluate:27Yjm0320)  Requested for: 00MEN7857; Last   Rx:25Sml1801 Ordered   2  Ventolin  (90 Base) MCG/ACT Inhalation Aerosol Solution; Therapy: (Recorded:39Bjh0068) to Recorded    Allergies    1  No Known Drug Allergies    2   FRUIT    Signatures   Electronically signed by : Kathy Bolivar, ; Apr 27 2017  9:25AM EST                       (Author)

## 2018-01-11 NOTE — MISCELLANEOUS
Message   Date: 12 May 2016 1:51 PM EST, Recorded By: Chantel Schmidt For: Елена Silverman, Gee   Phone: [phone] (Home)   Reason: Medical Complaint   pt has a Nexplanon and was very tired and did 2 pregnancy test  One was positive and one was negative    pt will come in fpr an appt as a NP  [facility] Active Problems    1  Encounter for initial prescription of contraceptives (V25 02) (Z30 019)   2  Encounter for routine postpartum follow-up (V24 2) (Z39 2)   3  Pregnancy (V22 2) (Z33 1)    Current Meds   1  Prenatal Vitamins TABS; Therapy: (Recorded:28Oct2013) to Recorded    Allergies    1  No Known Drug Allergies    2   FRUIT    Signatures   Electronically signed by : Angélica Workman, ; May 12 2016  1:53PM EST                       (Author)

## 2018-01-12 VITALS
DIASTOLIC BLOOD PRESSURE: 69 MMHG | SYSTOLIC BLOOD PRESSURE: 106 MMHG | WEIGHT: 138 LBS | BODY MASS INDEX: 27.09 KG/M2 | HEIGHT: 60 IN

## 2018-01-12 VITALS
WEIGHT: 131 LBS | BODY MASS INDEX: 25.72 KG/M2 | SYSTOLIC BLOOD PRESSURE: 106 MMHG | HEIGHT: 60 IN | DIASTOLIC BLOOD PRESSURE: 64 MMHG

## 2018-01-12 NOTE — MISCELLANEOUS
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   Gretchen Pineda - 22 Jun 2017 2:42 PM     TASK EDITED  pt scheduled appt           Active Problems    1  Low grade squamous intraepithelial lesion (LGSIL) on cervical Pap smear (795 03)   (Z60 746)   2  Pregnancy, obstetrical care (V22 1) (Z34 90)    Current Meds   1  PreNatal  MG Oral Capsule; Therapy: 74VPT1627 to Recorded   2  Ventolin  (90 Base) MCG/ACT Inhalation Aerosol Solution; Therapy: (Recorded:13Chz6562) to Recorded    Allergies    1  No Known Drug Allergies    2   FRUIT    Signatures   Electronically signed by : Alejandrina Diaz, ; Jun 22 2017  2:42PM EST                       (Author)

## 2018-01-12 NOTE — MISCELLANEOUS
Message  Pt did not complete 2nd portion of sequential screen-no record at [facility] labs  Active Problems    1  Encounter for fetal anatomic survey (V28 81) (Z36)   2  Encounter for screening for risk of pre-term labor (V28 82) (Z36)   3  Low grade squamous intraepithelial lesion (LGSIL) on cervical Pap smear (795 03)   (X01 300)   4  Pregnancy, obstetrical care (V22 1) (Z34 90)   5  Vaginal candidiasis (112 1) (B37 3)    Current Meds   1  PreNatal  MG Oral Capsule; Therapy: 95ESH5075 to Recorded   2  Terconazole 0 4 % Vaginal Cream; INSERT 1 APPLICATORFUL INTRAVAGINALLY AT   BEDTIME NIGHTLY; Therapy: 49Gya0020 to (Evaluate:18Qwi1742)  Requested for: 12Qoh2667; Last   Rx:93Mfn8751 Ordered   3  Ventolin  (90 Base) MCG/ACT Inhalation Aerosol Solution; Therapy: (Recorded:59Tgp2889) to Recorded    Allergies    1  No Known Drug Allergies    2   FRUIT    Signatures   Electronically signed by : Earl Rudolph RN; Aug 15 2017  8:54AM EST                       (Author)

## 2018-01-13 VITALS
HEIGHT: 60 IN | BODY MASS INDEX: 25.99 KG/M2 | WEIGHT: 132.38 LBS | DIASTOLIC BLOOD PRESSURE: 66 MMHG | SYSTOLIC BLOOD PRESSURE: 104 MMHG

## 2018-01-13 NOTE — PROGRESS NOTES
Active Problems    1  Low grade squamous intraepithelial lesion (LGSIL) on cervical Pap smear (795 03)   (J74 134)   2  Pregnancy, obstetrical care (V22 1) (Z34 90)    Current Meds    1  PreNatal  MG Oral Capsule; Therapy: 65PZG5625 to Recorded    2  Ventolin  (90 Base) MCG/ACT Inhalation Aerosol Solution; Therapy: (Recorded:30Wpv1147) to Recorded    Allergies    1  No Known Drug Allergies    2  FRUIT    Results/Data  96109 Transvaginal Ultrasound OB [facility]:   Procedure: 21462-QZREXVGJMN pregnant uterus real time with image documentation, fetal and maternal evaluation, first trimester (<14 weeks 0 days), transvaginal approach: single or first gestation  The study was done today in the office  Indication: EDC gestational age uncertain weeks  Exam indication: New OB  Findings:   Number of gestational sacs and fetuses: one gestational sac containing one embryo  Gestational sac/fetal measurements appropriate for gestation:   CRL= 54 8mm 12w1d  YS= 4 4mm  FHR= 153bpm    Survey of visible fetal and placental anatomic structure within normal limits  Qualitative assessment of amniotic fluid volume/gestational sac shape: within normal limits  Exam of maternal uterus and adnexa: left ovary demonstrates a 2 0cm corpus luteal cyst, otherwise appears within normal limits  Impression: Single, viable IUP of uncertain dates  12w1d by today's ultrasound        Signatures   Electronically signed by : Deepthi Cisneros, ; Jun 8 2017  9:02AM EST                       (Author)    Electronically signed by : FLORINDA Florez ; Jun 8 2017  9:32PM EST

## 2018-01-13 NOTE — PROGRESS NOTES
2017         RE: Fermin Overall                           To: [facility]   MR#: [id number]                                    [address]   : AUG [facility], [address] [place] [address] Street: [id number]                             Fax: [phone]   (Exam #: [id number])      The LMP of this 24year old,  G2, P1-0-0-1 patient was unknown, her   working JILLIAN is DEC 20 2017 and the current gestational age is 24 weeks 5   days by [address] [place] [address]  A sonographic examination was performed on 2017 using real time equipment  The ultrasound examination was   performed using abdominal & vaginal techniques  The patient has a BMI of   26 9  Her blood pressure today was 106/69  Earliest ultrasound found in her record: 17  12w1d  17JILLIAN Valdez has no complaints today  She reports fetal movement and denies   vaginal bleeding  Her prenatal course has been unremarkable in the interim   since her initial MFM evaluation  She tells me that she recently went to   the lab to complete genetic screening, though we cannot locate a copy of   that result for review at the time of her visit today        Cardiac motion was observed at 150 bpm       INDICATIONS      fetal anatomical survey      Exam Types      LEVEL II   Transvaginal      RESULTS      Fetus # 1 of 1   Vertex presentation   Fetal growth appeared normal   Placenta Location = Posterior   No placenta previa   Placenta Grade = I      MEASUREMENTS (* Included In Average GA)      AC              14 6 cm        19 weeks 4 days* (50%)   BPD              4 8 cm        20 weeks 4 days* (71%)   HC              16 8 cm        19 weeks 3 days* (38%)   Femur            3 2 cm        20 weeks 1 day * (51%)      Nuchal Fold      3 8 mm   NBL              8 0 mm      Humerus          3 1 cm        20 weeks 2 days  (65%)      Cerebellum       2 1 cm        20 weeks 3 days   Biorbit          3 1 cm 19 weeks 6 days   CisternaMagna    6 7 mm      HC/AC           1 15   FL/AC           0 22   FL/BPD          0 67   EFW (Ac/Fl/Hc)   316 grams - 0 lbs 11 oz      THE AVERAGE GESTATIONAL AGE is 19 weeks 6 days +/- 10 days  AMNIOTIC FLUID         Largest Vertical Pocket = 4 0 cm   Amniotic Fluid: Normal      CERVICAL EVALUATION      SUPINE      Cervical Length: 3 50 cm      OTHER TEST RESULTS           Funneling?: No             Dynamic Changes?: No        Resp  To TFP?: No                      Debris?: No      ANATOMY      Head                                    Normal   Face/Neck                               Normal   Th  Cav  Normal   Heart                                   Normal   Abd  Cav  Normal   Stomach                                 Normal   Right Kidney                            Normal   Left Kidney                             Normal   Bladder                                 Normal   Abd  Wall                               Normal   Spine                                   Normal   Extrems                                 Normal   Genitalia                               Normal   Placenta                                Normal   Umbl  Cord                              Normal   Uterus                                  Normal   PCI                                     Normal      ANATOMY DETAILS      Visualized Appearing Sonographically Normal:   HEAD: (Calvarium, BPD Level, Cavum, Lateral Ventricles, Choroid Plexus,   Cerebellum, Cisterna Magna);    FACE/NECK: (Neck, Nuchal Fold, Profile,   Orbits, Nose/Lips, Palate, Face);    TH  CAV  : (Lungs, Diaphragm);       HEART: (Four Chamber View, Proximal Left Outflow, Proximal Right Outflow,   3VV, 3 Vessel Trachea, Short Axis of Greater Vessels, Ductal Arch, Aortic   Arch, Interventricular Septum, Interatrial Septum, IVC, SVC, Cardiac Axis,   Cardiac Position);    ABD  CAV : (Liver);    STOMACH, RIGHT KIDNEY, LEFT   KIDNEY, BLADDER, ABD  WALL, SPINE: (Cervical Spine, Thoracic Spine, Lumbar   Spine, Sacrum);    EXTREMS: (Lt Humerus, Rt Humerus, Lt Forearm, Rt   Forearm, Lt Hand, Rt Hand, Lt Femur, Rt Femur, Lt Low Leg, Rt Low Leg, Lt   Foot, Rt Foot);    GENITALIA (Male), PLACENTA, UMBL  CORD, UTERUS, PCI      ADNEXA      The left ovary appeared normal and measured 2 6 x 2 2 x 1 8 cm with a   volume of 5 4 cc  The right ovary appeared normal and measured 2 8 x 1 9 x   2 1 cm with a volume of 5 8 cc  IMPRESSION      Jay IUP   19 weeks and 6 days by this ultrasound  (JILLIAN=DEC 19 2017)   19 weeks and 5 days by 1st Tri Sono  (JILLIAN=DEC 20 2017)   Vertex presentation   Fetal growth appeared normal   Normal anatomy survey   Regular fetal heart rate of 150 bpm   Posterior placenta   No placenta previa      GENERAL COMMENT      No fetal structural abnormality or ultrasound marker for aneuploidy is   identified on the Level II ultrasound study today  Fetal growth and   amniotic fluid volume are normal   The placenta is normal in appearance  The cervix is normal in appearance by transvaginal sonography  The   cervical length is normal   Cervical debris is not present  Cervical   funneling is not present  Neither provocative nor dynamic change is   appreciated  Today's ultrasound findings and suggested follow-up were discussed in   detail with [facility]  We discussed that prenatal ultrasound cannot rule   out all congenital abnormalities  No further appointment has been   scheduled in the [facility], [facility]  for [facility] at this time  Follow-up   [facility] ultrasound evaluation is recommended as clinically indicated  The face to face time, in addition to time spent discussing ultrasound   results, was approximately 10 minutes, greater than 50% of which was spent   during counseling and coordination of care  DIOMEDES Fatima M D     Maternal-Fetal Medicine   Electronically signed 07/31/17 15:27

## 2018-01-14 VITALS
WEIGHT: 140 LBS | SYSTOLIC BLOOD PRESSURE: 104 MMHG | HEIGHT: 63 IN | DIASTOLIC BLOOD PRESSURE: 68 MMHG | BODY MASS INDEX: 24.8 KG/M2

## 2018-01-14 VITALS
SYSTOLIC BLOOD PRESSURE: 100 MMHG | DIASTOLIC BLOOD PRESSURE: 56 MMHG | HEIGHT: 63 IN | WEIGHT: 151.38 LBS | BODY MASS INDEX: 26.82 KG/M2

## 2018-01-14 NOTE — MISCELLANEOUS
Message   Date: 11 Aug 2016 12:53 PM EST, Recorded By: Tony Faulkner For: Sibyl OsgoodSherlene Ahr, Self   Phone: [phone] (Home)   Reason: Medical Complaint   pt thinks that she has a yeast infection    she has her cycle    pt will see how she is when her cycle is over and use a 3 day Monistat at that time if not better  Twila Archer Active Problems    1  Encounter for initial prescription of contraceptives (V25 02) (Z30 019)   2  Encounter for routine gynecological examination with Papanicolaou smear of cervix   (V72 31,V76 2) (Z01 419)   3  Encounter for routine postpartum follow-up (V24 2) (Z39 2)   4  Denied: History of self breast exam   5  Pregnancy (V22 2) (Z33 1)   6  Screening for STDs (sexually transmitted diseases) (V74 5) (Z11 3)    Current Meds   1  Ventolin  (90 Base) MCG/ACT Inhalation Aerosol Solution; Therapy: (Recorded:51Fhc5547) to Recorded    Allergies    1  No Known Drug Allergies    2   FRUIT    Signatures   Electronically signed by : Lupe Galicia, ; Aug 11 2016 12:55PM EST                       (Author)

## 2018-01-15 VITALS
SYSTOLIC BLOOD PRESSURE: 118 MMHG | BODY MASS INDEX: 27.31 KG/M2 | HEIGHT: 63 IN | DIASTOLIC BLOOD PRESSURE: 68 MMHG | WEIGHT: 154.13 LBS

## 2018-01-15 NOTE — PROGRESS NOTES
Active Problems    1  Low grade squamous intraepithelial lesion (LGSIL) on cervical Pap smear (795 03)   (N61 809)   2  Pregnancy, obstetrical care (V22 1) (Z34 90)    Current Meds    1  PreNatal  MG Oral Capsule; Therapy: 94WQE3014 to Recorded    2  Ventolin  (90 Base) MCG/ACT Inhalation Aerosol Solution; Therapy: (Recorded:03Vjg3241) to Recorded    Allergies    1  No Known Drug Allergies    2  FRUIT    Results/Data  15654 Abdominal Ultrasound OB Nichole Lopezffer:   Procedure: The study was done today in the office  [address]  Indication: EDC gestational age 44w9d with an JILLIAN of 12/20/2017 weeks  Exam indication: pt request    Findings:   Amniotic fluid volume: 21 6 + 0 0 + 25 4 + 30 5 = 77 5mm  Fetal heart beat: 135bpm    Placental location: posterior  Fetal position: vertex  Impression:   BPD= 88 6mm 35w6d  HC= 325 5mm 36w6d  AC= 335 5mm 37w3d  FL= 36w6d 36w6d  HL= 63 1mm 36w4d    The fetal kidneys, stomach,bladder, and diaphragm appear within normal limits on today's exam  Fetal anatomy has been previously evaluated on Level II ultrasound at [facility]     EFW= 3102g 6lb 13oz 55%    Impression: Appropriate interval growth  36w5d with an JILLIAN of 12/20/2017 by LMP and US          Signatures   Electronically signed by : Matilda Castro, ; Nov 27 2017  1:19PM EST                       (Author)    Electronically signed by : FLORINDA Gray ; Nov 27 2017  5:05PM EST

## 2018-01-17 NOTE — MISCELLANEOUS
Provider Comments  Provider Comments:   PT NO SHOW HAS NEXPLANON WANTS TO DISCUSS DIFF BC      Signatures   Electronically signed by : Kailee Malloy, ; Feb 10 2016  3:14PM EST                       (Author)    Electronically signed by :  Toma Snellen, CRNP; Feb 12 2016  1:12PM EST                       (Acknowledgement)    Electronically signed by : Demetris Ferreira MD; Feb 18 2016  9:05AM EST

## 2018-01-17 NOTE — MISCELLANEOUS
Message  Spoke to patient, she is due to have her Nexplanon removed  SHe would like to have it replaced at the same time  [place] will cover Nexplanon at 100% under plan  No ded or oop  Patient to pay 0  Appointment given with Dr Marialuisa Sanders 1/16/2017  Active Problems    1  Encounter for initial prescription of contraceptives (V25 02) (Z30 019)   2  Encounter for routine gynecological examination with Papanicolaou smear of cervix   (V72 31,V76 2) (Z01 419)   3  Encounter for routine postpartum follow-up (V24 2) (Z39 2)   4  Denied: History of self breast exam   5  Pregnancy (V22 2) (Z33 1)   6  Screening for STDs (sexually transmitted diseases) (V74 5) (Z11 3)    Current Meds   1  Ventolin  (90 Base) MCG/ACT Inhalation Aerosol Solution; Therapy: (Recorded:62Xjm0881) to Recorded    Allergies    1  No Known Drug Allergies    2  FRUIT    Signatures   Electronically signed by :  Kashmir Best, ; Dec 12 2016  3:00PM EST                       (Author)

## 2018-01-18 NOTE — MISCELLANEOUS
Message   Date: 24 Apr 2017 11:44 AM EST, Recorded By: Opal Melendez For: Ubaldo Overcast: Luis Enrique Mata, Self   Phone: [phone] (Home)   Reason: Other   Patient called to report + HPT  Had Nexplanon removed in February  C/o irreg cycle since with on and off spotting  Last episode of spotting was at the end of March  Pt was not using any form of birth control since removal of Nexplanon  Advised start PNV  Sent for [facility]  Active Problems    1  Contraception (V25 9) (Z30 9)   2  Encounter for initial prescription of contraceptives (V25 02) (Z30 019)   3  Encounter for routine gynecological examination with Papanicolaou smear of cervix   (V72 31,V76 2) (Z01 419)   4  Encounter for routine postpartum follow-up (V24 2) (Z39 2)   5  Denied: History of self breast exam   6  Irregular menstrual cycle (626 4) (N92 6)   7  Low grade squamous intraepithelial lesion (LGSIL) on cervical Pap smear (795 03)   (R87 612)   8  Nexplanon removal (V25 43) (Z30 46)   9  Pregnancy (V22 2) (Z33 1)   10  Screening for STDs (sexually transmitted diseases) (V74 5) (Z11 3)    Current Meds   1  Norethin Ace-Eth Estrad-FE 1-20 MG-MCG Oral Tablet (Loestrin Fe 1/20); TAKE 1   TABLET DAILY; Therapy: 96EGK2055 to (Evaluate:02Dur5942)  Requested for: 42QRV6116; Last   Rx:80Sox9326 Ordered   2  Ventolin  (90 Base) MCG/ACT Inhalation Aerosol Solution; Therapy: (Recorded:62Nlx7987) to Recorded    Allergies    1  No Known Drug Allergies    2  FRUIT    Plan  Irregular menstrual cycle    · (1) HCG QUANT; Status:Active - Retrospective Authorization;  Requested for:24Apr2017;     Signatures   Electronically signed by : Brian Roland, ; Apr 24 2017 11:47AM EST                       (Author)

## 2018-01-22 VITALS
HEIGHT: 63 IN | DIASTOLIC BLOOD PRESSURE: 56 MMHG | WEIGHT: 151 LBS | SYSTOLIC BLOOD PRESSURE: 98 MMHG | BODY MASS INDEX: 26.75 KG/M2

## 2018-01-22 VITALS
WEIGHT: 156.5 LBS | SYSTOLIC BLOOD PRESSURE: 116 MMHG | HEART RATE: 86 BPM | HEIGHT: 63 IN | BODY MASS INDEX: 27.73 KG/M2 | DIASTOLIC BLOOD PRESSURE: 83 MMHG

## 2018-01-22 VITALS
HEIGHT: 60 IN | DIASTOLIC BLOOD PRESSURE: 56 MMHG | SYSTOLIC BLOOD PRESSURE: 100 MMHG | BODY MASS INDEX: 25.72 KG/M2 | WEIGHT: 131 LBS

## 2018-01-22 VITALS
DIASTOLIC BLOOD PRESSURE: 66 MMHG | BODY MASS INDEX: 26.5 KG/M2 | HEIGHT: 60 IN | SYSTOLIC BLOOD PRESSURE: 100 MMHG | WEIGHT: 135 LBS

## 2018-01-22 VITALS
BODY MASS INDEX: 27.91 KG/M2 | DIASTOLIC BLOOD PRESSURE: 76 MMHG | SYSTOLIC BLOOD PRESSURE: 118 MMHG | HEIGHT: 63 IN | WEIGHT: 157.5 LBS

## 2018-01-23 NOTE — MISCELLANEOUS
Message   Date: 07 Dec 2017 11:34 AM EST, Recorded By: Fabienne Freitas For: Crow Goldman, Self   Phone: [phone] (Home)   Reason: Medical Complaint   pt is complaining of decreased movement with the baby    pt will go to L&D for evaluation           Active Problems    1  Low grade squamous intraepithelial lesion (LGSIL) on cervical Pap smear (795 03)   (U90 107)   2  Pregnancy, obstetrical care (V22 1) (Z34 90)    Current Meds   1  PreNatal  MG Oral Capsule; Therapy: 35ZEJ2872 to Recorded   2  Ventolin  (90 Base) MCG/ACT Inhalation Aerosol Solution; Therapy: (Recorded:19Gpu1512) to Recorded    Allergies    1  No Known Drug Allergies    2   FRUIT    Signatures   Electronically signed by : Yoan Sanford, ; Dec  7 2017 11:35AM EST                       (Author)

## 2018-01-23 NOTE — MISCELLANEOUS
Message   Recorded as Task   Date: 12/12/2017 04:27 PM, Created By: Zora Moreno   Task Name: Follow Up   Assigned To: Anne Oro   Regarding Patient: Mary Mayo, Status: Active   CommentUnvinnie Ramires - 12 Dec 2017 4:27 PM     TASK CREATED  Called patient for post partum check  States doing well but has a cold  Advised rest, increase fluids  Patient states only slight lochia, bowels and bladder WNL  Breastfeeding is going well but breasts are engorged today  Advised observe, breastfeed more often, warm compress, call back if any sx of mastitis  Patient is resting at home being cared for by her boyfriend  Geronimo Acharya - 13 Dec 2017 8:00 AM     TASK REPLIED TO: Previously Assigned To [employer], thanks        Active Problems    1  Low grade squamous intraepithelial lesion (LGSIL) on cervical Pap smear (795 03)   (A73 187)   2  Pregnancy, obstetrical care (V22 1) (Z34 90)    Current Meds   1  PreNatal  MG Oral Capsule; Therapy: 72LAI1701 to Recorded   2  Ventolin  (90 Base) MCG/ACT Inhalation Aerosol Solution; Therapy: (Recorded:90Lqt4612) to Recorded    Allergies    1  No Known Drug Allergies    2   FRUIT    Signatures   Electronically signed by : Casper Dhillon, ; Dec 15 2017  9:12AM EST                       (Author)

## 2018-01-23 NOTE — PROGRESS NOTES
Active Problems    1  Low grade squamous intraepithelial lesion (LGSIL) on cervical Pap smear (795 03)   (S73 047)   2  Pregnancy, obstetrical care (V22 1) (Z34 90)    Current Meds    1  PreNatal  MG Oral Capsule; Therapy: 13BPF4007 to Recorded    2  Ventolin  (90 Base) MCG/ACT Inhalation Aerosol Solution; Therapy: (Recorded:34Had8112) to Recorded    Allergies    1  No Known Drug Allergies    2  FRUIT    Results/Data  40414 Abdominal Ultrasound OB Felicita Madden:   Procedure: 47481- Ultrasound pregnant uterus real time with image documentation, limited one or more fetuses  The study was done today in the office  Indication: EDC gestational age 37w6d with an JILLIAN of 12/20/2017 weeks  Exam indication: decreased FM  Findings:   Amniotic fluid volume: 31 8 + 28 0 + 17 8 + 12 7 = 90 3mm  Fetal heart beat: 131bpm    Fetal position: vertex  Impression: Appropriate ANGELIC for fetal age  Future Appointments    Date/Time Provider Specialty Site   12/12/2017 01:30 PM FLORINDA Garcia  Obstetrics/Gynecology [facility]   12/19/2017 02:00 PM FLORINDA Garcia   Obstetrics/Gynecology [facility]     Signatures   Electronically signed by : Alma Delia Newman, ; Dec  4 2017  3:14PM EST                       (Author)    Electronically signed by : FLORINDA Trejo ; Dec  4 2017  4:13PM EST

## 2018-01-23 NOTE — MISCELLANEOUS
Message   Date: 06 Dec 2017 1:42 PM EST, Recorded By: Gabby Almendarez For: Meriel AverMilas Meckel, Self   Phone: [phone] (Home)   Reason: Medical Complaint   pt called    her contractions are 20 min apart    water still intact    pt was instructed to call back when her contractions are more like 10 min apart or if her water breaks    pt knows that she can have the answering service call the dr on call           Active Problems    1  Low grade squamous intraepithelial lesion (LGSIL) on cervical Pap smear (795 03)   (S03 547)   2  Pregnancy, obstetrical care (V22 1) (Z34 90)    Current Meds   1  PreNatal  MG Oral Capsule; Therapy: [id number] to Recorded   2  Ventolin  (90 Base) MCG/ACT Inhalation Aerosol Solution; Therapy: (Recorded:[id number]) to Recorded    Allergies    1  No Known Drug Allergies    2   FRUIT    Signatures   Electronically signed by : Elisabeth Nevarez, ; Dec  6 2017  1:43PM EST                       (Author)

## 2018-01-24 VITALS
SYSTOLIC BLOOD PRESSURE: 122 MMHG | WEIGHT: 162 LBS | BODY MASS INDEX: 28.7 KG/M2 | DIASTOLIC BLOOD PRESSURE: 68 MMHG | HEART RATE: 81 BPM | HEIGHT: 63 IN

## 2018-02-21 ENCOUNTER — TELEPHONE (OUTPATIENT)
Dept: OBGYN CLINIC | Facility: CLINIC | Age: 23
End: 2018-02-21

## 2018-02-21 NOTE — TELEPHONE ENCOUNTER
Pt had a baby 2 months ago and wants to get the Nexplanon    She had it before    Does she need a discussion or can I just have Mayrlin check on coverage and then she can just get it?

## 2018-02-21 NOTE — TELEPHONE ENCOUNTER
You can have Marylin check for the Nexplanon coverage and then arrange for insertion  We would need to check pregnancy test prior to insertion  Recommend adequate contraception with condoms prior to that    Thanks

## 2018-07-23 ENCOUNTER — HOSPITAL ENCOUNTER (EMERGENCY)
Dept: HOSPITAL 42 - ED | Age: 23
Discharge: HOME | End: 2018-07-23
Payer: MEDICAID

## 2018-07-23 VITALS
DIASTOLIC BLOOD PRESSURE: 72 MMHG | TEMPERATURE: 98.2 F | RESPIRATION RATE: 18 BRPM | SYSTOLIC BLOOD PRESSURE: 120 MMHG | HEART RATE: 89 BPM

## 2018-07-23 VITALS — OXYGEN SATURATION: 99 %

## 2018-07-23 DIAGNOSIS — N83.201: ICD-10-CM

## 2018-07-23 DIAGNOSIS — Z3A.01: ICD-10-CM

## 2018-07-23 DIAGNOSIS — O34.81: ICD-10-CM

## 2018-07-23 DIAGNOSIS — O23.41: Primary | ICD-10-CM

## 2018-07-23 LAB
ALBUMIN SERPL-MCNC: 4.4 G/DL (ref 3–4.8)
ALBUMIN/GLOB SERPL: 1.5 {RATIO} (ref 1.1–1.8)
ALT SERPL-CCNC: 23 U/L (ref 7–56)
APPEARANCE UR: CLEAR
AST SERPL-CCNC: 24 U/L (ref 14–36)
BACTERIA #/AREA URNS HPF: (no result) /[HPF]
BASOPHILS # BLD AUTO: 0.02 K/MM3 (ref 0–2)
BASOPHILS NFR BLD: 0.1 % (ref 0–3)
BILIRUB UR-MCNC: NEGATIVE MG/DL
BUN SERPL-MCNC: 12 MG/DL (ref 7–21)
CALCIUM SERPL-MCNC: 9.6 MG/DL (ref 8.4–10.5)
COLOR UR: YELLOW
EOSINOPHIL # BLD: 0.1 10*3/UL (ref 0–0.7)
EOSINOPHIL NFR BLD: 0.3 % (ref 1.5–5)
ERYTHROCYTE [DISTWIDTH] IN BLOOD BY AUTOMATED COUNT: 14.4 % (ref 11.5–14.5)
GFR NON-AFRICAN AMERICAN: > 60
GLUCOSE UR STRIP-MCNC: NEGATIVE MG/DL
GRANULOCYTES # BLD: 13.15 10*3/UL (ref 1.4–6.5)
GRANULOCYTES NFR BLD: 80.9 % (ref 50–68)
HGB BLD-MCNC: 12.3 G/DL (ref 12–16)
LEUKOCYTE ESTERASE UR-ACNC: (no result) LEU/UL
LYMPHOCYTES # BLD: 2.3 10*3/UL (ref 1.2–3.4)
LYMPHOCYTES NFR BLD AUTO: 14.2 % (ref 22–35)
MCH RBC QN AUTO: 26 PG (ref 25–35)
MCHC RBC AUTO-ENTMCNC: 33.1 G/DL (ref 31–37)
MCV RBC AUTO: 78.6 FL (ref 80–105)
MONOCYTES # BLD AUTO: 0.7 10*3/UL (ref 0.1–0.6)
MONOCYTES NFR BLD: 4.5 % (ref 1–6)
PH UR STRIP: 6 [PH] (ref 4.7–8)
PLATELET # BLD: 367 10^3/UL (ref 120–450)
PMV BLD AUTO: 9 FL (ref 7–11)
PROT UR STRIP-MCNC: 100 MG/DL
RBC # BLD AUTO: 4.73 10^6/UL (ref 3.5–6.1)
RBC # UR STRIP: (no result) /UL
RBC #/AREA URNS HPF: (no result) /HPF (ref 0–2)
SP GR UR STRIP: 1.02 (ref 1–1.03)
UROBILINOGEN UR STRIP-ACNC: 0.2 E.U./DL
WBC # BLD AUTO: 16.3 10^3/UL (ref 4.5–11)
WBC #/AREA URNS HPF: (no result) /HPF (ref 0–6)

## 2018-07-23 PROCEDURE — 99283 EMERGENCY DEPT VISIT LOW MDM: CPT

## 2018-07-23 PROCEDURE — 81001 URINALYSIS AUTO W/SCOPE: CPT

## 2018-07-23 PROCEDURE — 87086 URINE CULTURE/COLONY COUNT: CPT

## 2018-07-23 PROCEDURE — 86900 BLOOD TYPING SEROLOGIC ABO: CPT

## 2018-07-23 PROCEDURE — 84702 CHORIONIC GONADOTROPIN TEST: CPT

## 2018-07-23 PROCEDURE — 85025 COMPLETE CBC W/AUTO DIFF WBC: CPT

## 2018-07-23 PROCEDURE — 76817 TRANSVAGINAL US OBSTETRIC: CPT

## 2018-07-23 PROCEDURE — 80053 COMPREHEN METABOLIC PANEL: CPT

## 2018-07-23 PROCEDURE — 96365 THER/PROPH/DIAG IV INF INIT: CPT

## 2018-07-23 PROCEDURE — 86850 RBC ANTIBODY SCREEN: CPT

## 2018-07-23 NOTE — ED PDOC
Arrival/HPI





- General


Time Seen by Provider: 18 17:40


Historian: Patient





- History of Present Illness


Narrative History of Present Illness (Text): 





18 17:43


21 y/o female, no pmh, nkda, approx. 5 weeks pregnant, , LMP 18, c/o 

suprapubic discomfort with urinary frequency/urgency x 2 days with no fall or 

trauma.  Pt. stated that she has no vaginal bleeding or discharge, no nausea or 

vomiting, no fever or chills, no headache or night sweat, no dizziness, no 

change in vision, no palpitation, no other medical or psychological complaints. 





Past Medical History





- Provider Review


Nursing Documentation Reviewed: Yes





Family/Social History





- Physician Review


Nursing Documentation Reviewed: Yes


Family/Social History: Unknown Family HX





Allergies/Home Meds


Allergies/Adverse Reactions: 


Allergies





No Known Allergies Allergy (Verified 18 17:44)


 








Home Medications: 


 Home Meds











 Medication  Instructions  Recorded  Confirmed


 


Albuterol Sulfate [Proair 0 mcg IH PRN PRN 18





Respiclick]   














Review of Systems





- Review of Systems


Constitutional: absent: Fatigue


Eyes: absent: Vision Changes


ENT: absent: Hearing Changes


Respiratory: absent: SOB, Cough


Cardiovascular: absent: Chest Pain


Gastrointestinal: absent: Abdominal Pain, Nausea, Vomiting


Genitourinary Female: Dysuria, Frequency.  absent: Hematuria, Urine Output 

Changes, Vaginal Bleeding, Vaginal Discharge


Skin: absent: Rash, Pruritis


Psychiatric: absent: Anxiety, Depression





Physical Exam


Vital Signs Reviewed: Yes


Vital Signs











  Temp Pulse Resp BP Pulse Ox


 


 18 17:42  99.2 F  87  18  125/67  100











Temperature: Afebrile


Blood Pressure: Normal


Pulse: Regular


Respiratory Rate: Normal


Appearance: Positive for: Well-Appearing, Non-Toxic, Comfortable


Pain Distress: Mild


Mental Status: Positive for: Alert and Oriented X 3





- Systems Exam


Head: Present: Atraumatic, Normocephalic


Pupils: Present: PERRL


Extroacular Muscles: Present: EOMI


Conjunctiva: Present: Normal


Mouth: Present: Moist Mucous Membranes


Neck: Present: Normal Range of Motion


Respiratory/Chest: Present: Clear to Auscultation, Good Air Exchange.  No: 

Respiratory Distress, Accessory Muscle Use


Cardiovascular: Present: Regular Rate and Rhythm, Normal S1, S2.  No: Murmurs


Abdomen: No: Tenderness, Distention, Peritoneal Signs, Rebound, Guarding


Genitourinary/Pelvic Exam: Present: Other (Pt. declined)


Back: Present: Normal Inspection.  No: CVA Tenderness


Upper Extremity: Present: Normal Inspection.  No: Cyanosis, Edema


Lower Extremity: Present: Normal Inspection.  No: Edema


Neurological: Present: GCS=15, CN II-XII Intact, Speech Normal, Motor Func 

Grossly Intact, Gait Normal, Memory Normal


Skin: Present: Warm, Dry, Normal Color.  No: Rashes


Psychiatric: Present: Alert, Oriented x 3, Normal Insight, Normal Concentration





Medical Decision Making


ED Course and Treatment: 





18 17:45


Differential: UTI vs. Ectopic pregnancy vs. miscarriage


-Labs


-beta hcg/UA


-Transvaginal sonogram


-Observe and reassess





18 20:13


-Urine hcg is positive


-UA show +UTI, rocephine 1gm. 


-Type and screen is O+


-Labs show no acute findings except wbc 16.3


-Betahcg 9391 


-Transvaginal sonogram show Endometrial cystic structure is present, presumably 

an early gestational sac. No yolk sac or fetal


pole seen. Recommend close clinical and if necessary sonographic followup to 

confirm viability. Peripherally vascular complex right ovarian cyst likely 

corpus luteum.


-Pt. feels much better, pain resolved, eating and drinking well, no cva 

tenderness, no fever or chills, refused pelvic examination, refused admission 

and stable for outpatient management at this time.  wbc 16 is non-specific, can 

be pregnancy/uti/stress/pain induced.


-Discharge home with keflex, tylenol, stay hydrated, bed rest, follow up with 

your own pmd and obgyn within 2 days, repeat Urinalysis after completion of 

antibiotic, repeat cbc in 2-3 days as your wbc is 16 today, return to the ER 

for any new or worsening signs or symptoms. 








- Lab Interpretations


Lab Results: 








 18 18:34 





 18 18:34 





 Lab Results





18 19:15: Urine Color Yellow, Urine Appearance Clear, Urine pH 6.0, Ur 

Specific Gravity 1.025, Urine Protein 100 H, Urine Glucose (UA) Negative, Urine 

Ketones Negative, Urine Blood Large H, Urine Nitrate Negative, Urine Bilirubin 

Negative, Urine Urobilinogen 0.2, Ur Leukocyte Esterase Small H, Urine RBC 

Pending, Urine WBC Pending


18 19:05: Blood Type Confirm O POSITIVE


18 18:34: Blood Type O POSITIVE, Antibody Screen Negative, BBK History 

Checked No verified bt


18 18:34: WBC 16.3 H, RBC 4.73, Hgb 12.3, Hct 37.2, MCV 78.6 L, MCH 26.0, 

MCHC 33.1, RDW 14.4, Plt Count 367, MPV 9.0, Gran % 80.9 H, Lymph % (Auto) 14.2 

L, Mono % (Auto) 4.5, Eos % (Auto) 0.3 L, Baso % (Auto) 0.1, Gran # 13.15 H, 

Lymph # (Auto) 2.3, Mono # (Auto) 0.7 H, Eos # (Auto) 0.1, Baso # (Auto) 0.02


18 18:34: Beta HCG, Quant 9391.30 H


18 18:34: Sodium 139, Potassium 4.3, Chloride 102, Carbon Dioxide 26, 

Anion Gap 15, BUN 12, Creatinine 0.6 L, Est GFR ( Amer) > 60, Est GFR (

Non-Af Amer) > 60, Random Glucose 89, Calcium 9.6, Total Bilirubin 0.3, AST 24, 

ALT 23, Alkaline Phosphatase 68, Total Protein 7.3, Albumin 4.4, Globulin 2.9, 

Albumin/Globulin Ratio 1.5











- RAD Interpretation


Radiology Orders: 








18 17:49


OB TRANSVAGINAL PREGNANCY [US] Stat 














FINDINGS:


Gestation: Endometrial cystic structure is present, with size correlating to 

gestational age of 5


weeks, 1 day. No yolk sac or fetal pole seen.


Placenta/amniotic fluid: Cannot be adequately evaluated due to the early 

gestational age.


Uterus/cervix: The cervix is closed and a length of 3.4 cm. No myometrial mass.


Ovaries: The right ovary is 4.4 x 1.7 x 2.4 cm, inclusive of a 2.5 cm thick 

walled peripherally vascular


complex cyst which is most likely corpus luteum. The left ovary is 3.9 x 1.70 

0.4 cm with normal


grayscale appearance. Normal bilateral color Doppler flow.


Free fluid: Trace cul-de-sac free fluid.


IMPRESSION:





1. Endometrial cystic structure is present, presumably an early gestational 

sac. No yolk sac or fetal


pole seen. Recommend close clinical and if necessary sonographic followup to 

confirm viability.


2. Peripherally vascular complex right ovarian cyst likely corpus luteum.





Thank you for allowing us to participate in the care of your patient.


Dictated and Authenticated by: Jonathan Heath MD


2018 7:35 PM Eastern Time ([place] & [place])


: Radiologist





- Medication Orders


Current Medication Orders: 








Ceftriaxone Sodium (Rocephin 1 Gram Ivpb)  1 gm in 100 mls @ 200 mls/hr IVPB 

STAT STA


   PRN Reason: Protocol


   Stop: 18 20:35





Discontinued Medications





Acetaminophen (Tylenol 325mg Tab)  650 mg PO STAT STA


   Stop: 18 17:51


   Last Admin: 18 19:22  Dose: 650 mg





MAR Pain/Vitals


 Document     18 19:22  MS  (Rec: 18 19:24  MS  [id number])


     Pain Reassessment


      Is This A Pain ReAssessment?               No


     Sleep


      Is patient sleeping during reassessment?   No


     Presence of Pain


      Presence of Pain                           Yes


     Pain Scale Used


      Pain Scale Used                            Numeric


     Location


      Pain Location Body Site                    Abdomen


      Description                                Intermittent


      Intensity                                  6


      Scale Used                                 Numeric


      Pain Behavior                              Guarding














- PA / NP / Resident Statement


MD/DO has reviewed & agrees with the documentation as recorded.





Disposition/Present on Arrival





- Present on Arrival


Any Indicators Present on Arrival: No


History of DVT/PE: No


History of Uncontrolled Diabetes: No


Urinary Catheter: No


History of Decub. Ulcer: No





- Disposition


Have Diagnosis and Disposition been Completed?: Yes


Diagnosis: 


 Pregnancy, Ovarian cyst, UTI (urinary tract infection)





Disposition: HOME/ ROUTINE


Disposition Time: 17:45


Patient Plan: Discharge


Patient Problems: 


 Current Active Problems











Problem Status Onset


 


Ovarian cyst Acute  


 


Pregnancy Acute  











Condition: GOOD


Additional Instructions: 


-Discharge home with keflex, tylenol, stay hydrated, bed rest, follow up with 

your own pmd and obgyn within 2 days, repeat Urinalysis after completion of 

antibiotic, repeat cbc in 2-3 days as your wbc is 16 today, return to the ER 

for any new or worsening signs or symptoms. 





Prescriptions: 


Acetaminophen [Pain Relief] 500 mg PO QID PRN #25 tablet


 PRN Reason: Other


Cephalexin [Keflex] 500 mg PO QID #28 capsule


Referrals: 


Marzena Mcneil MD [Staff Provider] - Follow up with primary


Elmer Beebe MD [Staff Provider] - Follow up with primary


[facility] [Outside] - Follow up with primary


Forms:  WORK NOTE

## 2018-07-24 ENCOUNTER — HOSPITAL ENCOUNTER (EMERGENCY)
Dept: HOSPITAL 42 - ED | Age: 23
Discharge: HOME | End: 2018-07-24
Payer: MEDICAID

## 2018-07-24 VITALS — DIASTOLIC BLOOD PRESSURE: 72 MMHG | HEART RATE: 72 BPM | SYSTOLIC BLOOD PRESSURE: 115 MMHG | OXYGEN SATURATION: 99 %

## 2018-07-24 VITALS — TEMPERATURE: 98.5 F

## 2018-07-24 VITALS — RESPIRATION RATE: 18 BRPM

## 2018-07-24 VITALS — BODY MASS INDEX: 29.2 KG/M2

## 2018-07-24 DIAGNOSIS — Z3A.01: ICD-10-CM

## 2018-07-24 DIAGNOSIS — O23.41: Primary | ICD-10-CM

## 2018-07-24 DIAGNOSIS — O20.9: ICD-10-CM

## 2018-07-24 LAB
ALBUMIN SERPL-MCNC: 4.3 G/DL (ref 3–4.8)
ALBUMIN/GLOB SERPL: 1.5 {RATIO} (ref 1.1–1.8)
ALT SERPL-CCNC: 21 U/L (ref 7–56)
APPEARANCE UR: (no result)
AST SERPL-CCNC: 17 U/L (ref 14–36)
BACTERIA #/AREA URNS HPF: (no result) /[HPF]
BASOPHILS # BLD AUTO: 0.02 K/MM3 (ref 0–2)
BASOPHILS NFR BLD: 0.2 % (ref 0–3)
BILIRUB UR-MCNC: NEGATIVE MG/DL
BUN SERPL-MCNC: 14 MG/DL (ref 7–21)
CALCIUM SERPL-MCNC: 9.7 MG/DL (ref 8.4–10.5)
COLOR UR: YELLOW
EOSINOPHIL # BLD: 0.1 10*3/UL (ref 0–0.7)
EOSINOPHIL NFR BLD: 0.8 % (ref 1.5–5)
ERYTHROCYTE [DISTWIDTH] IN BLOOD BY AUTOMATED COUNT: 14.6 % (ref 11.5–14.5)
GFR NON-AFRICAN AMERICAN: > 60
GLUCOSE UR STRIP-MCNC: NEGATIVE MG/DL
GRANULOCYTES # BLD: 5.51 10*3/UL (ref 1.4–6.5)
GRANULOCYTES NFR BLD: 63.9 % (ref 50–68)
HGB BLD-MCNC: 12.2 G/DL (ref 12–16)
LEUKOCYTE ESTERASE UR-ACNC: (no result) LEU/UL
LYMPHOCYTES # BLD: 2.6 10*3/UL (ref 1.2–3.4)
LYMPHOCYTES NFR BLD AUTO: 29.7 % (ref 22–35)
MCH RBC QN AUTO: 26.2 PG (ref 25–35)
MCHC RBC AUTO-ENTMCNC: 32.8 G/DL (ref 31–37)
MCV RBC AUTO: 80 FL (ref 80–105)
MONOCYTES # BLD AUTO: 0.5 10*3/UL (ref 0.1–0.6)
MONOCYTES NFR BLD: 5.4 % (ref 1–6)
PH UR STRIP: 7 [PH] (ref 4.7–8)
PLATELET # BLD: 376 10^3/UL (ref 120–450)
PMV BLD AUTO: 9.2 FL (ref 7–11)
PROT UR STRIP-MCNC: 30 MG/DL
RBC # BLD AUTO: 4.65 10^6/UL (ref 3.5–6.1)
RBC # UR STRIP: (no result) /UL
SP GR UR STRIP: 1.01 (ref 1–1.03)
UROBILINOGEN UR STRIP-ACNC: 0.2 E.U./DL
WBC # BLD AUTO: 8.6 10^3/UL (ref 4.5–11)

## 2018-07-24 PROCEDURE — 99284 EMERGENCY DEPT VISIT MOD MDM: CPT

## 2018-07-24 PROCEDURE — 87086 URINE CULTURE/COLONY COUNT: CPT

## 2018-07-24 PROCEDURE — 96374 THER/PROPH/DIAG INJ IV PUSH: CPT

## 2018-07-24 PROCEDURE — 84702 CHORIONIC GONADOTROPIN TEST: CPT

## 2018-07-24 PROCEDURE — 81001 URINALYSIS AUTO W/SCOPE: CPT

## 2018-07-24 PROCEDURE — 80053 COMPREHEN METABOLIC PANEL: CPT

## 2018-07-24 PROCEDURE — 76830 TRANSVAGINAL US NON-OB: CPT

## 2018-07-24 PROCEDURE — 85025 COMPLETE CBC W/AUTO DIFF WBC: CPT

## 2018-07-24 NOTE — ED PDOC
Arrival/HPI





- General


Chief Complaint: Female Genitourinary


Time Seen by Provider: 18 14:41


Historian: Patient





- History of Present Illness


Narrative History of Present Illness (Text): 





18 14:42


23 y/o female, , LMP 2018, approx. 5 weeks pregnant, seen  in the ER 

yesterday for UTI symptoms which she received IV rocephine and discharge home 

with keflex.  Pt. stated that she started to have lower pelvic pain with 

vaginal spotting started last night after left the ER with no fall or trauma, 

no fever or chills, no flank pain, no numbness or tingling.  Pt. stated that 

she has been spotting and couple episodes with clot started today, no night 

sweat, no rash, no other medical or psychological complaints. 





Past Medical History





- Provider Review


Nursing Documentation Reviewed: Yes





- Infectious Disease


Hx of Infectious Diseases: None





- Cardiac


Hx Cardiac Disorders: No





- Pulmonary


Hx Respiratory Disorders: Yes


Hx Asthma: Yes





- Renal


Hx Renal Disorder: No





- Endocrine/Metabolic


Hx Endocrine Disorders: No





- Hematological/Oncological


Hx Blood Disorders: No





- Musculoskeletal/Rheumatological


Hx Musculoskeletal Disorders: No





- Gastrointestinal


Hx Gastrointestinal Disorders: No





- Genitourinary/Gynecological


Hx Genitourinary Disorders: No





- Psychiatric


Hx Substance Use: No





- Surgical History


Hx Tonsillectomy: Yes





- Anesthesia


Hx Anesthesia: No





Family/Social History





- Physician Review


Nursing Documentation Reviewed: Yes


Family/Social History: Unknown Family HX


Smoking Status: Current Some Days Smoker


Hx Alcohol Use: No


Hx Substance Use: No





Allergies/Home Meds


Allergies/Adverse Reactions: 


Allergies





No Known Allergies Allergy (Verified 18 17:44)


 








Home Medications: 


 Home Meds











 Medication  Instructions  Recorded  Confirmed


 


Albuterol HFA [Ventolin HFA 90 1 puff IH PRN PRN 18





mcg/actuation (8 g)]   














Review of Systems





- Review of Systems


Constitutional: absent: Fatigue, Fevers


Eyes: absent: Vision Changes


ENT: absent: Hearing Changes


Respiratory: absent: SOB, Cough


Cardiovascular: absent: Chest Pain


Gastrointestinal: absent: Abdominal Pain, Nausea, Vomiting


Genitourinary Female: Vaginal Bleeding, Other (+pelvic pain)


Skin: absent: Rash, Pruritis


Neurological: absent: Headache


Psychiatric: absent: Anxiety, Depression





Physical Exam


Vital Signs Reviewed: Yes


Vital Signs











  Temp Pulse Resp BP Pulse Ox


 


 18 14:30  98.6 F  64  18  111/68  98











Temperature: Afebrile


Blood Pressure: Normal


Pulse: Regular


Respiratory Rate: Normal


Appearance: Positive for: Well-Appearing, Non-Toxic, Comfortable


Pain Distress: Mild


Mental Status: Positive for: Alert and Oriented X 3





- Systems Exam


Head: Present: Atraumatic, Normocephalic


Pupils: Present: PERRL


Extroacular Muscles: Present: EOMI


Conjunctiva: Present: Normal


Mouth: Present: Moist Mucous Membranes


Neck: Present: Normal Range of Motion


Respiratory/Chest: Present: Clear to Auscultation, Good Air Exchange.  No: 

Respiratory Distress, Accessory Muscle Use


Cardiovascular: Present: Regular Rate and Rhythm, Normal S1, S2.  No: Murmurs


Abdomen: No: Tenderness, Distention, Peritoneal Signs, Rebound, Guarding


Genitourinary/Pelvic Exam: Present: Normal External Genitalia, Cervical os 

Closed, Other (Female Chaperone: ER Staff Annelise Kirsten (scribe)).  No: 

Vaginal Discharge, Vaginal Bleeding, Vaginal Lesions, Adenexal Tenderness, 

Adenexal Mass, Cervical Motion Tendernes, Odor


Back: Present: Normal Inspection


Upper Extremity: Present: Normal Inspection.  No: Cyanosis, Edema


Lower Extremity: Present: Normal Inspection.  No: Edema


Neurological: Present: GCS=15, CN II-XII Intact, Speech Normal


Skin: Present: Warm, Dry, Normal Color.  No: Rashes


Psychiatric: Present: Alert, Oriented x 3, Normal Insight, Normal Concentration





Medical Decision Making


ED Course and Treatment: 





18 14:57


Differential: Cystitis/pylonephritis vs. Miscarriage vs. Dehydration


-labs/betahcg/ua


-transvaginal


-IVF/tylenol/rocephine


-Observe and reassess





18 16:48


-Sonogram show: 10 mm intrauterine gestational sac corresponding to 5 weeks 0 

days. No fetal pole identified. Possible viable early intrauterine pregnancy.  

Followup with transvaginal pelvic ultrasound and serial beta HCG evaluation is 

advised.


-Blood type from yesterday is O+


-UA show still mild UTI but much better compared to yesterday, pending urine 

culture, IV rocephine ordered, prescribed keflex and tylenol yesterday.


-Labs show resolved leukocytosis 16 which is down to 8 today. 


-Betahcg is 9083 from 9391


-Pt. has no active bleeding, comfortable, pain decreased, all labs and 

radiology results discussed with the patient and there is possible possibility 

for miscarriage but little too early to determine at this time. Pt. stated that 

her obgyn is Dr. RAVINDRA Foster. 


-Case discussed with Dr. Zhong including labs/radiology result, he agreed on the 

dispo and consult.


-Discharge home with education on continue tylenol and keflex at home, repeat 

the beta in 24-48 hours,  hcg on 2018 was 9391, beta hcg on 2018 is 

9083, you need to follow up with your own pmd and obgyn within 2 days, return 

to the ER for any new or worsening signs or symptoms. 





Reassessment Condition: Re-examined, Improved





- Lab Interpretations


Lab Results: 








 18 15:30 





 18 15:30 





 Lab Results





18 15:30: Beta HCG, Quant 9083.80 H


18 15:30: Sodium 144, Potassium 4.2, Chloride 105, Carbon Dioxide 25, 

Anion Gap 18, BUN 14, Creatinine 0.7, Est GFR (African Amer) > 60, Est GFR (Non-

Af Amer) > 60, Random Glucose 87, Calcium 9.7, Total Bilirubin 0.3, AST 17, ALT 

21, Alkaline Phosphatase 65, Total Protein 7.2, Albumin 4.3, Globulin 2.8, 

Albumin/Globulin Ratio 1.5


18 15:30: Urine Color Yellow, Urine Appearance Slight-cloudy, Urine pH 7.0

, Ur Specific Gravity 1.015, Urine Protein 30 H, Urine Glucose (UA) Negative, 

Urine Ketones Negative, Urine Blood Large H, Urine Nitrate Negative, Urine 

Bilirubin Negative, Urine Urobilinogen 0.2, Ur Leukocyte Esterase Trace H, 

Urine RBC 2 - 5, Urine WBC 1 - 3, Ur Epithelial Cells 1 - 3, Urine Bacteria 

Small


18 15:30: WBC 8.6  D, RBC 4.65, Hgb 12.2, Hct 37.2, MCV 80.0, MCH 26.2, 

MCHC 32.8, RDW 14.6 H, Plt Count 376, MPV 9.2, Gran % 63.9, Lymph % (Auto) 29.7

, Mono % (Auto) 5.4, Eos % (Auto) 0.8 L, Baso % (Auto) 0.2, Gran # 5.51, Lymph 

# (Auto) 2.6, Mono # (Auto) 0.5, Eos # (Auto) 0.1, Baso # (Auto) 0.02








I have reviewed the lab results: Yes





- RAD Interpretation


Radiology Orders: 








18 14:51


TRANSVAGINAL [US] Stat 








Date of service: 





2018





HISTORY:


vaginal bleeding





COMPARISON:


None available.





TECHNIQUE:








FINDINGS:





UTERUS:


Measures  cm. No uterine mass. 





ENDOMETRIUM:


There is an intrauterine gestational sac measuring 10 mm in diameter, 

corresponding to 5 weeks 0 days gestational age. No fetal pole or yolk sac are 

visualized at this time.  There is no subchorionic hemorrhage. 





CERVIX:


No cervical abnormality identified.





RIGHT OVARY:


Measures 2.8 x 4.3 x 1.8 cm. A 15 mm corpus luteum is noted. There is no solid 

mass identified. Normal flow. 





LEFT OVARY:


Measures 3.1 x 1.5 x 3.3 cm. No solid mass. Normal flow. 





FREE FLUID:


No significant free fluid noted.





OTHER FINDINGS:


None. 





IMPRESSION:


10 mm intrauterine gestational sac corresponding to 5 weeks 0 days. No fetal 

pole identified. Possible viable early intrauterine pregnancy.  Followup with 

transvaginal pelvic ultrasound and serial beta HCG evaluation is advised.








: Radiologist





- Medication Orders


Current Medication Orders: 











Discontinued Medications





Acetaminophen (Tylenol 325mg Tab)  650 mg PO STAT STA


   Stop: 18 14:52


   Last Admin: 18 15:40  Dose: 650 mg





MAR Pain/Vitals


 Document     18 15:40  HI  (Rec: 18 15:41  HI  [id number])


     Pain Reassessment


      Is This A Pain ReAssessment?               No


     Sleep


      Is patient sleeping during reassessment?   No


     Presence of Pain


      Presence of Pain                           Yes





Sodium Chloride (Sodium Chloride 0.9%)  1,000 mls @ 999 mls/hr IV .Q1H1M STA


   Stop: 18 15:52


   Last Admin: 18 16:24  Dose: 999 mls/hr





eMAR Start Stop


 Document     18 16:24  HI  (Rec: 18 16:24  HI  [email or web address])


     Intravenous Solution


      Start Date                                 18


      Start Time                                 16:24














- PA / NP / Resident Statement


MD/DO has reviewed & agrees with the documentation as recorded.





Disposition/Present on Arrival





- Present on Arrival


Any Indicators Present on Arrival: No


History of DVT/PE: No


History of Uncontrolled Diabetes: No


Urinary Catheter: No


History of Decub. Ulcer: No


History Surgical Site Infection Following: None





- Disposition


Have Diagnosis and Disposition been Completed?: Yes


Diagnosis: 


 Vaginal bleeding in pregnancy, UTI (urinary tract infection)





Disposition: HOME/ ROUTINE


Disposition Time: 16:51


Patient Plan: Discharge


Condition: IMPROVED


Additional Instructions: 


Discharge home with education on continue tylenol and keflex at home, repeat 

the beta in 24-48 hours,  hcg on 2018 was 9391, beta hcg on 2018 is 

9083, you need to follow up with your own pmd and obgyn within 2 days, return 

to the ER for any new or worsening signs or symptoms. 


Referrals: 


Dima Norris ANP [Primary Care Provider] - Follow up with primary


So Foster MD [Medical Doctor] - Follow up with primary


Parveen Liao MD [Staff Provider] - Follow up with primary


Forms:  WORK NOTE

## 2018-07-24 NOTE — US
PROCEDURE:  OB Pelvic Ultrasound



HISTORY:

suprapubic discomfort



COMPARISON:

None available.



FINDINGS:



UTERUS:

Intrauterine gestational sac identified.  Sac diameter is 11 mm 

corresponding to 5 weeks 1 day gestational age.  No fetal pole or 

yolk sac is identified. 



There is no detectable fetal cardiac activity at this time. 



There is no subchorionic hemorrhage. 







Uterus measures 8.2 x 5.0 x 5.9 cm. No mass



CERVIX:

Long and closed. No cervical abnormality seen.



RIGHT OVARY:

Measures 4.4 x 1.7 x 2.4 cm. No mass. Normal flow. Corpus luteum 

noted, 2.5 x 1.9 x 2.3 cm.



LEFT OVARY:

Measures 3.9 x 1.7 x 1.4 cm. No mass. Normal flow. 



FREE FLUID:

None.



OTHER FINDINGS:

None. 



IMPRESSION:

Intrauterine gestational sac without fetal pole or yolk sac.  

Possible early intrauterine gestation.  Followup with transvaginal 

pelvic ultrasound and serial beta HCG is advised.  Incidental right 

ovarian corpus luteum. No subchorionic hemorrhage.



The preliminary findings for this examination were reported by 

[facility] at 7:35 p.m. on 7/23/2018. There is concurrence of 

this report with the preliminary findings.

## 2018-07-24 NOTE — US
Date of service: 



07/24/2018



HISTORY:

vaginal bleeding



COMPARISON:

None available.



TECHNIQUE:





FINDINGS:



UTERUS:

Measures  cm. No uterine mass. 



ENDOMETRIUM:

There is an intrauterine gestational sac measuring 10 mm in diameter, 

corresponding to 5 weeks 0 days gestational age. No fetal pole or 

yolk sac are visualized at this time.  There is no subchorionic 

hemorrhage. 



CERVIX:

No cervical abnormality identified.



RIGHT OVARY:

Measures 2.8 x 4.3 x 1.8 cm. A 15 mm corpus luteum is noted. There is 

no solid mass identified. Normal flow. 



LEFT OVARY:

Measures 3.1 x 1.5 x 3.3 cm. No solid mass. Normal flow. 



FREE FLUID:

No significant free fluid noted.



OTHER FINDINGS:

None. 



IMPRESSION:

10 mm intrauterine gestational sac corresponding to 5 weeks 0 days. 

No fetal pole identified. Possible viable early intrauterine 

pregnancy.  Followup with transvaginal pelvic ultrasound and serial 

beta HCG evaluation is advised.

## 2018-07-25 ENCOUNTER — HOSPITAL ENCOUNTER (EMERGENCY)
Dept: HOSPITAL 42 - ED | Age: 23
Discharge: LEFT BEFORE DISCHARGE | End: 2018-07-25
Payer: MEDICAID

## 2018-07-25 VITALS
HEART RATE: 74 BPM | OXYGEN SATURATION: 100 % | TEMPERATURE: 98.6 F | DIASTOLIC BLOOD PRESSURE: 75 MMHG | SYSTOLIC BLOOD PRESSURE: 121 MMHG | RESPIRATION RATE: 19 BRPM

## 2018-07-25 VITALS — BODY MASS INDEX: 29.2 KG/M2

## 2018-07-25 DIAGNOSIS — Z02.89: Primary | ICD-10-CM

## 2018-07-25 DIAGNOSIS — R10.9: ICD-10-CM

## 2018-07-25 NOTE — ED PDOC
Arrival/HPI





- General


Chief Complaint: Abdominal Pain


Time Seen by Provider: 18 00:25





- History of Present Illness


Narrative History of Present Illness (Text): 





18 00:34





21 y/o female, , LMP 2018, approx. 5 weeks pregnant, seen  in the ER 

yesterday for UTI symptoms which she received IV rocephine and discharge home 

with keflex.  Pt. stated that she started to have lower pelvic pain with 

vaginal spotting started last night after left the ER with no fall or trauma, 

no fever or chills, no flank pain, no numbness or tingling.  Pt. stated that 

she has been spotting and couple episodes with clot started today, no night 

sweat, no rash, no other medical or psychological complaints. 








Past Medical History





- Provider Review


Nursing Documentation Reviewed: Yes





- Infectious Disease


Hx of Infectious Diseases: None





- Cardiac


Hx Cardiac Disorders: No





- Pulmonary


Hx Respiratory Disorders: Yes


Hx Asthma: Yes





- Neurological


Hx Neurological Disorder: No





- HEENT


Hx HEENT Disorder: No





- Renal


Hx Renal Disorder: No





- Endocrine/Metabolic


Hx Endocrine Disorders: No





- Hematological/Oncological


Hx Blood Disorders: No





- Integumentary


Hx Dermatological Disorder: No





- Musculoskeletal/Rheumatological


Hx Musculoskeletal Disorders: No





- Gastrointestinal


Hx Gastrointestinal Disorders: No





- Genitourinary/Gynecological


Hx Genitourinary Disorders: No





- Psychiatric


Hx Psychophysiologic Disorder: No


Hx Substance Use: No





- Surgical History


Hx Tonsillectomy: Yes





- Anesthesia


Hx Anesthesia: No





Family/Social History





- Physician Review


Nursing Documentation Reviewed: Yes


Family/Social History: No Known Family HX


Smoking Status: Current Some Days Smoker


Hx Alcohol Use: No


Hx Substance Use: No





Allergies/Home Meds


Allergies/Adverse Reactions: 


Allergies





pomegranate Allergy (Verified 18 00:14)


 ITCHING








Home Medications: 


 Home Meds











 Medication  Instructions  Recorded  Confirmed


 


Albuterol HFA [Ventolin HFA 90 1 puff IH PRN PRN 18





mcg/actuation (8 g)]   














Review of Systems





- Review of Systems


Constitutional: absent: Fatigue, Fevers


Eyes: absent: Vision Changes


ENT: absent: Hearing Changes


Respiratory: absent: SOB, Cough


Cardiovascular: absent: Chest Pain


Gastrointestinal: absent: Abdominal Pain, Nausea, Vomiting


Genitourinary Female: Vaginal Bleeding, Other ((+ pelvic pain))


Skin: absent: Rash, Pruritis


Neurological: absent: Headache


Psychiatric: absent: Anxiety, Depression





Physical Exam





- Physical Exam


Narrative Physical Exam (Text): 





18 00:36








Head: Present: Atraumatic, Normocephalic


Pupils: Present: PERRL


Extroacular Muscles: Present: EOMI


Conjunctiva: Present: Normal


Mouth: Present: Moist Mucous Membranes


Neck: Present: Normal Range of Motion


Respiratory/Chest: Present: Clear to Auscultation, Good Air Exchange.  No: 

Respiratory Distress, Accessory Muscle Use


Cardiovascular: Present: Regular Rate and Rhythm, Normal S1, S2.  No: Murmurs


Abdomen: No: Tenderness, Distention, Peritoneal Signs, Rebound, Guarding


Genitourinary/Pelvic Exam: Present: Normal External Genitalia, Cervical os 

Closed.  No: Vaginal Discharge, Vaginal Bleeding, Vaginal Lesions, Adenexal 

Tenderness, Adenexal Mass, Cervical Motion Tendernes, Odor


Back: Present: Normal Inspection


Upper Extremity: Present: Normal Inspection.  No: Cyanosis, Edema


Lower Extremity: Present: Normal Inspection.  No: Edema


Neurological: Present: GCS=15, CN II-XII Intact, Speech Normal


Skin: Present: Warm, Dry, Normal Color.  No: Rashes


Psychiatric: Present: Alert, Oriented x 3, Normal Insight, Normal Concentration








Vital Signs Reviewed: Yes


Vital Signs











  Temp Pulse Resp BP Pulse Ox


 


 18 00:16  98.6 F  74  19  121/75  100











Temperature: Afebrile


Blood Pressure: Normal


Pulse: Regular


Respiratory Rate: Normal


Appearance: Positive for: Well-Appearing, Non-Toxic, Comfortable


Pain Distress: None


Mental Status: Positive for: Alert and Oriented X 3





Medical Decision Making


ED Course and Treatment: 





18 00:37





Differential: Cystitis/pylonephritis vs. Miscarriage vs. Dehydration


-labs/betahcg/ua


-transvaginal


-IVF/tylenol/rocephine


-Observe and reassess








- Lab Interpretations


I have reviewed the lab results: Yes





Disposition/Present on Arrival





- Present on Arrival


History of DVT/PE: No


History of Uncontrolled Diabetes: No


Urinary Catheter: No


History of Decub. Ulcer: No


History Surgical Site Infection Following: None





- Disposition


Forms:  [facility] (English)

## 2018-09-20 ENCOUNTER — OFFICE VISIT (OUTPATIENT)
Dept: OBGYN CLINIC | Facility: CLINIC | Age: 23
End: 2018-09-20
Payer: COMMERCIAL

## 2018-09-20 VITALS
HEIGHT: 60 IN | BODY MASS INDEX: 29.17 KG/M2 | DIASTOLIC BLOOD PRESSURE: 60 MMHG | WEIGHT: 148.6 LBS | SYSTOLIC BLOOD PRESSURE: 110 MMHG

## 2018-09-20 DIAGNOSIS — Z30.017 ENCOUNTER FOR INITIAL PRESCRIPTION OF IMPLANTABLE SUBDERMAL CONTRACEPTIVE: Primary | ICD-10-CM

## 2018-09-20 PROBLEM — R87.612 LOW GRADE SQUAMOUS INTRAEPITHELIAL LESION (LGSIL) ON CERVICAL PAP SMEAR: Status: ACTIVE | Noted: 2017-02-07

## 2018-09-20 PROBLEM — Z3A.38 38 WEEKS GESTATION OF PREGNANCY: Status: RESOLVED | Noted: 2017-12-07 | Resolved: 2018-09-20

## 2018-09-20 PROCEDURE — 99213 OFFICE O/P EST LOW 20 MIN: CPT | Performed by: OBSTETRICS & GYNECOLOGY

## 2018-09-20 RX ORDER — ALBUTEROL SULFATE 90 UG/1
1 AEROSOL, METERED RESPIRATORY (INHALATION) EVERY 6 HOURS
COMMUNITY
End: 2020-02-25 | Stop reason: ALTCHOICE

## 2018-09-20 NOTE — PROGRESS NOTES
Khushi Diggs is here today for discussion only with regards to contraception  The patient who has used Implanon, followed by Nexplanon, with good results with the latter, wishes to have another placement of the Nexplanon  She had prior to this gone on birth control pills for a short time with that results  She currently takes no contraception  She does not wish to try a different type of birth control, or consider the Depakote shot or an IUD  She and I reviewed the pros and cons of Nexplanon and the patient does wish to proceed with the same  I informed her we will check with her insurance and get back to her with any needed costs and then schedule an insertion date

## 2018-10-23 ENCOUNTER — TELEPHONE (OUTPATIENT)
Dept: OBGYN CLINIC | Facility: CLINIC | Age: 23
End: 2018-10-23

## 2018-10-31 ENCOUNTER — PROCEDURE VISIT (OUTPATIENT)
Dept: OBGYN CLINIC | Facility: CLINIC | Age: 23
End: 2018-10-31
Payer: COMMERCIAL

## 2018-10-31 VITALS
SYSTOLIC BLOOD PRESSURE: 110 MMHG | WEIGHT: 150 LBS | DIASTOLIC BLOOD PRESSURE: 66 MMHG | HEIGHT: 60 IN | BODY MASS INDEX: 29.45 KG/M2

## 2018-10-31 DIAGNOSIS — Z30.017 NEXPLANON INSERTION: Primary | ICD-10-CM

## 2018-10-31 PROCEDURE — 11981 INSERTION DRUG DLVR IMPLANT: CPT | Performed by: OBSTETRICS & GYNECOLOGY

## 2018-10-31 NOTE — PROGRESS NOTES
Remove and insert drug implant  Date/Time: 10/31/2018 9:02 AM  Performed by: Sobia Antoine  Authorized by: Sobia Antoine     Consent:     Consent obtained:  Written    Consent given by:  Patient    Procedural risks discussed:  Bleeding, infection and possible continued pain    Patient questions answered: yes      Patient agrees, verbalizes understanding, and wants to proceed: yes      Educational handouts given: yes      Instructions and paperwork completed: yes    Indication:     Indication: Insertion of non-biodegradable drug delivery implant    Pre-procedure:     Pre-procedure timeout performed: yes      Prepped with: povidone-iodine      Local anesthetic:  Lidocaine 1%    The site was cleaned and prepped in a sterile fashion: yes    Procedure:     Procedure:   Insertion    Small stab incision was made in arm: yes      Left/right:  Left    Preloaded Implanon trocar was placed subdermally: yes      Visualization of implant was obtained: yes      Implanon was inserted and trocar removed: yes      Visualization of notch in stilette and palpitation of device: yes      Palpitation confirms placement by provider and patient: yes      Site was closed with steri-strips and pressure bandage applied: yes

## 2018-11-18 ENCOUNTER — HOSPITAL ENCOUNTER (EMERGENCY)
Facility: HOSPITAL | Age: 23
Discharge: HOME | End: 2018-11-19
Attending: EMERGENCY MEDICINE | Admitting: EMERGENCY MEDICINE
Payer: COMMERCIAL

## 2018-11-18 VITALS
RESPIRATION RATE: 16 BRPM | HEART RATE: 63 BPM | WEIGHT: 149 LBS | OXYGEN SATURATION: 98 % | SYSTOLIC BLOOD PRESSURE: 121 MMHG | DIASTOLIC BLOOD PRESSURE: 77 MMHG | TEMPERATURE: 98.9 F | BODY MASS INDEX: 29.1 KG/M2

## 2018-11-18 DIAGNOSIS — N94.6 DYSMENORRHEA: Primary | ICD-10-CM

## 2018-11-18 DIAGNOSIS — N92.0 MENORRHAGIA: ICD-10-CM

## 2018-11-18 LAB
BILIRUB UR QL STRIP: NEGATIVE
CLARITY UR: CLEAR
COLOR UR: YELLOW
COLOR, POC: YELLOW
EXT PREG TEST URINE: NORMAL
GLUCOSE UR STRIP-MCNC: NEGATIVE MG/DL
HGB UR QL STRIP.AUTO: ABNORMAL
KETONES UR STRIP-MCNC: NEGATIVE MG/DL
LEUKOCYTE ESTERASE UR QL STRIP: NEGATIVE
NITRITE UR QL STRIP: NEGATIVE
PH UR STRIP.AUTO: 6 [PH] (ref 4.5–8)
PROT UR STRIP-MCNC: ABNORMAL MG/DL
SP GR UR STRIP.AUTO: 1.02 (ref 1–1.03)
UROBILINOGEN UR QL STRIP.AUTO: 1 E.U./DL

## 2018-11-18 PROCEDURE — 99284 EMERGENCY DEPT VISIT MOD MDM: CPT

## 2018-11-18 PROCEDURE — 81025 URINE PREGNANCY TEST: CPT | Performed by: EMERGENCY MEDICINE

## 2018-11-18 PROCEDURE — 81001 URINALYSIS AUTO W/SCOPE: CPT

## 2018-11-19 LAB
BACTERIA UR QL AUTO: ABNORMAL /HPF
MUCOUS THREADS UR QL AUTO: ABNORMAL
NON-SQ EPI CELLS URNS QL MICRO: ABNORMAL /HPF
RBC #/AREA URNS AUTO: ABNORMAL /HPF
WBC #/AREA URNS AUTO: ABNORMAL /HPF

## 2018-11-19 NOTE — ED PROVIDER NOTES
History  Chief Complaint   Patient presents with    Vaginal Bleeding     States "I think I miss carried" states due for period last week, today started with vaginal bleeding heavier then normal peroid then passed large clot on her pad followed by lower abd cramping pt states she jerica her pad with clot on it to show to the doctor  LMP 10/13/18, pt  Had implanon implanted on 10/31/18  Pt  Was supposed to get her period last week , then she started having abd  Cramping and vaginal bleeding with clots today  Prior to Admission Medications   Prescriptions Last Dose Informant Patient Reported? Taking? albuterol (PROVENTIL HFA,VENTOLIN HFA) 90 mcg/act inhaler   Yes No   Sig: Inhale 2 puffs every 6 (six) hours as needed for wheezing   albuterol (PROVENTIL HFA,VENTOLIN HFA) 90 mcg/act inhaler   Yes No   Sig: Inhale 1 puff every 6 (six) hours   ibuprofen (MOTRIN) 600 mg tablet   No No   Sig: Take 1 tablet by mouth every 6 (six) hours as needed for mild pain for up to 10 days      Facility-Administered Medications: None       Past Medical History:   Diagnosis Date    Abnormal Pap smear of cervix     Anemia     Asthma     Gonorrhea     Miscarriage     Nexplanon removal     last assessed 02/07/17    Urinary tract infection        Past Surgical History:   Procedure Laterality Date    TONSILLECTOMY         Family History   Problem Relation Age of Onset    Asthma Mother     Asthma Father     Cancer Family         mother and father side     I have reviewed and agree with the history as documented  Social History   Substance Use Topics    Smoking status: Never Smoker    Smokeless tobacco: Never Used    Alcohol use Yes      Comment: social        Review of Systems   Constitutional: Negative for appetite change, fatigue and fever  HENT: Negative for rhinorrhea and sore throat  Respiratory: Negative for cough, shortness of breath and wheezing      Cardiovascular: Negative for chest pain and leg swelling  Gastrointestinal: Positive for abdominal pain  Negative for diarrhea and vomiting  Genitourinary: Positive for vaginal bleeding  Negative for dysuria and flank pain  Musculoskeletal: Negative for back pain and neck pain  Skin: Negative for rash  Neurological: Negative for syncope and headaches  Psychiatric/Behavioral:        Mood normal       Physical Exam  Physical Exam   Constitutional: She is oriented to person, place, and time  She appears well-developed and well-nourished  HENT:   Head: Normocephalic and atraumatic  Neck: Normal range of motion  Neck supple  Cardiovascular: Normal rate and regular rhythm  Pulmonary/Chest: Effort normal and breath sounds normal    Abdominal: Soft  There is no tenderness  Musculoskeletal: Normal range of motion  Neurological: She is alert and oriented to person, place, and time  Skin: Skin is warm and dry  Nursing note and vitals reviewed        Vital Signs  ED Triage Vitals [11/18/18 2239]   Temperature Pulse Respirations Blood Pressure SpO2   98 9 °F (37 2 °C) 63 16 121/77 98 %      Temp Source Heart Rate Source Patient Position - Orthostatic VS BP Location FiO2 (%)   Oral Monitor Sitting Right arm --      Pain Score       3           Vitals:    11/18/18 2239   BP: 121/77   Pulse: 63   Patient Position - Orthostatic VS: Sitting       Visual Acuity      ED Medications  Medications - No data to display    Diagnostic Studies  Results Reviewed     Procedure Component Value Units Date/Time    Urine Microscopic [[id number]]  (Abnormal) Collected:  11/18/18 2357    Lab Status:  Final result Specimen:  Urine from Urine, Clean Catch Updated:  11/19/18 0007     RBC, UA 20-30 (A) /hpf      WBC, UA None Seen /hpf      Epithelial Cells Occasional /hpf      Bacteria, UA Occasional /hpf      MUCUS THREADS Occasional (A)    POCT urinalysis dipstick [[id number]]  (Normal) Resulted:  11/18/18 2345    Lab Status:  Final result Updated:  11/18/18 2345 Color, UA yellow    POCT pregnancy, urine [[id number]]  (Normal) Resulted:  11/18/18 2345    Lab Status:  Final result Updated:  11/18/18 2345     EXT PREG TEST UR (Ref: Negative) neg    ED Urine Macroscopic [[id number]]  (Abnormal) Collected:  11/18/18 2357    Lab Status:  Final result Specimen:  Urine Updated:  11/18/18 2344     Color, UA Yellow     Clarity, UA Clear     pH, UA 6 0     Leukocytes, UA Negative     Nitrite, UA Negative     Protein, UA Trace (A) mg/dl      Glucose, UA Negative mg/dl      Ketones, UA Negative mg/dl      Urobilinogen, UA 1 0 E U /dl      Bilirubin, UA Negative     Blood, UA Large (A)     Specific [place], UA 1 025    Narrative:       CLINITEK RESULT                 No orders to display              Procedures  Procedures       Phone Contacts  ED Phone Contact    ED Course                               MDM  Number of Diagnoses or Management Options  Dysmenorrhea:   Menorrhagia:      Amount and/or Complexity of Data Reviewed  Clinical lab tests: ordered and reviewed    Risk of Complications, Morbidity, and/or Mortality  Presenting problems: moderate  General comments: Pt  Is stable to follow up with ob-gyn dr Dee Chavez Time    Disposition  Final diagnoses:   Dysmenorrhea   Menorrhagia     Time reflects when diagnosis was documented in both MDM as applicable and the Disposition within this note     Time User Action Codes Description Comment    11/19/2018 12:09 AM Rashmi HERCULES Add [N94 6] Dysmenorrhea     11/19/2018 12:09 AM Harsha León Add [N92 0] Menorrhagia       ED Disposition     ED Disposition Condition Comment    Discharge  [address] discharge to home/self care      Condition at discharge: Stable        Follow-up Information     Follow up With Specialties Details Why [address] [facility]   [address] [place] [address] [address] [facility]  [phone]            Discharge Medication List as of 11/19/2018 12:10 AM      CONTINUE these medications which have NOT CHANGED    Details   !! albuterol (PROVENTIL HFA,VENTOLIN HFA) 90 mcg/act inhaler Inhale 2 puffs every 6 (six) hours as needed for wheezing, Until Discontinued, Historical Med      !! albuterol (PROVENTIL HFA,VENTOLIN HFA) 90 mcg/act inhaler Inhale 1 puff every 6 (six) hours, Historical Med      ibuprofen (MOTRIN) 600 mg tablet Take 1 tablet by mouth every 6 (six) hours as needed for mild pain for up to 10 days, Starting Tue 1/2/2018, Until Fri 1/12/2018, Normal       !! - Potential duplicate medications found  Please discuss with provider  No discharge procedures on file      ED Provider  Electronically Signed by           Sheeba Diane MD  11/22/18 5206

## 2018-11-19 NOTE — DISCHARGE INSTRUCTIONS
Dysmenorrhea   WHAT YOU NEED TO KNOW:   What is dysmenorrhea? Dysmenorrhea is painful menstrual cramps at or around the time of your monthly period  What causes dysmenorrhea? Your body normally produces chemicals each month to help your uterus contract  When too many of these chemicals are made, your uterus contracts too much and causes pain  Dysmenorrhea may also be caused by any of the following:  · Abnormal structure of your uterus or vagina    · A narrow cervix    · Growth in or on your uterus or ovaries    · Medical conditions, such as pelvic inflammatory disease, endometriosis, or uterine fibroids    · A copper intrauterine device (IUD)  What increases my risk for dysmenorrhea? · Never been pregnant    · Obesity    · Smoking    · Family history of painful menstrual cramps    · Pelvic infection    · Longer monthly period cycle    · Medical conditions, such as a sexually transmitted infection or endometriosis  What are the signs and symptoms of dysmenorrhea? · Mild to severe pain    · Cramping pain in lower abdomen or low back    · Bloating    · Headache    · Diarrhea  How is dysmenorrhea diagnosed? Your healthcare provider can usually diagnose dysmenorrhea by your signs and symptoms  Tell him when your symptoms started and if you have pain between your monthly periods  He may ask if anything relieves your pain, such as heat or medicine  Tell your healthcare provider if you are sexually active or have ever been pregnant  You may need any of the following:  · A blood test  will check for pregnancy  · A pelvic exam  may be needed to check the size and shape of your uterus and ovaries  Your healthcare provider gently inserts a warmed speculum into your vagina  A speculum is a tool that opens your vagina to show your cervix  · A cervical culture  may be needed to check for infection  Your healthcare provider will use a swab to collect a sample of cells from your cervix   This will be sent to a lab for tests     · An ultrasound  will show abnormal structure of your reproductive organs  Sound waves are used to show pictures on a monitor  How is dysmenorrhea treated? Dysmenorrhea can be controlled with lifestyle changes and medicines  It usually improves with age and pregnancy  · Medicines:      ¨ NSAIDs  help decrease swelling and pain or fever  This medicine is available with or without a doctor's order  NSAIDs can cause stomach bleeding or kidney problems in certain people  If you take blood thinner medicine, always ask your healthcare provider if NSAIDs are safe for you  Always read the medicine label and follow directions  ¨ Birth control medicine  may help decrease your pain  This medicine may be birth control pills or an IUD that does not contain copper  · Transcutaneous electric nerve stimulation  (TENS), is a device used to stimulate your nerves and decrease pain  Ask your healthcare provider for more information about TENS  How can I manage my symptoms? · Eat low-fat foods  Increase the amount of vegetables and raw seeds you eat  Ask your healthcare provider if you should take vitamin B or magnesium supplements  These will help decrease your pain  Do not eat dairy products or eggs  · Apply heat  on your lower abdomen for 20 to 30 minutes every 2 hours for as many days as directed  Heat helps decrease pain and muscle spasms  · Manage your stress  Stress can make your symptoms worse  Try relaxation exercises, such as deep breathing  · Exercise regularly  Ask your healthcare provider about the best exercise plan for you  Exercise can help decrease pain  · Keep a record of your pain  Write down when your pain and periods start and stop  Bring the record with you to your follow-up visits  · Do not smoke  Avoid others who smoke  If you smoke, it is never too late to quit  Smoking can increase your risk for dysmenorrhea   Ask your healthcare provider for information if you need help quitting  When should I contact my healthcare provider? · You have anxiety or feel depressed  · Your periods are early, late, or more painful than usual     · You have questions or concerns about your condition or care  When should I seek immediate care or call 911? · You have severe pain  · You have heavy vaginal bleeding and you feel faint  · You have sudden chest pain and trouble breathing  CARE AGREEMENT:   You have the right to help plan your care  Learn about your health condition and how it may be treated  Discuss treatment options with your caregivers to decide what care you want to receive  You always have the right to refuse treatment  The above information is an  only  It is not intended as medical advice for individual conditions or treatments  Talk to your doctor, nurse or pharmacist before following any medical regimen to see if it is safe and effective for you  © 2017 [address] [employer] Information is for End User's use only and may not be sold, redistributed or otherwise used for commercial purposes  All illustrations and images included in CareNotes® are the copyrighted property of A D A M , Inc  or [employer]

## 2019-05-13 ENCOUNTER — PROCEDURE VISIT (OUTPATIENT)
Dept: OBGYN CLINIC | Facility: CLINIC | Age: 24
End: 2019-05-13
Payer: COMMERCIAL

## 2019-05-13 VITALS — DIASTOLIC BLOOD PRESSURE: 84 MMHG | WEIGHT: 154 LBS | BODY MASS INDEX: 30.08 KG/M2 | SYSTOLIC BLOOD PRESSURE: 132 MMHG

## 2019-05-13 DIAGNOSIS — Z30.46 NEXPLANON REMOVAL: Primary | ICD-10-CM

## 2019-05-13 DIAGNOSIS — Z30.011 ENCOUNTER FOR INITIAL PRESCRIPTION OF CONTRACEPTIVE PILLS: ICD-10-CM

## 2019-05-13 PROCEDURE — 11982 REMOVE DRUG IMPLANT DEVICE: CPT | Performed by: OBSTETRICS & GYNECOLOGY

## 2019-05-13 PROCEDURE — 99213 OFFICE O/P EST LOW 20 MIN: CPT | Performed by: OBSTETRICS & GYNECOLOGY

## 2019-05-13 RX ORDER — NORETHINDRONE ACETATE AND ETHINYL ESTRADIOL 1MG-20(21)
1 KIT ORAL DAILY
Qty: 28 TABLET | Refills: 12 | Status: SHIPPED | OUTPATIENT
Start: 2019-05-13 | End: 2020-01-07 | Stop reason: ALTCHOICE

## 2020-01-07 ENCOUNTER — OFFICE VISIT (OUTPATIENT)
Dept: OBGYN CLINIC | Facility: CLINIC | Age: 25
End: 2020-01-07
Payer: COMMERCIAL

## 2020-01-07 VITALS — WEIGHT: 152 LBS | BODY MASS INDEX: 29.69 KG/M2 | DIASTOLIC BLOOD PRESSURE: 70 MMHG | SYSTOLIC BLOOD PRESSURE: 112 MMHG

## 2020-01-07 DIAGNOSIS — Z36.9 ANTENATAL SCREENING ENCOUNTER: Primary | ICD-10-CM

## 2020-01-07 DIAGNOSIS — N91.2 AMENORRHEA: ICD-10-CM

## 2020-01-07 LAB — SL AMB POCT URINE HCG: POSITIVE

## 2020-01-07 PROCEDURE — 99213 OFFICE O/P EST LOW 20 MIN: CPT | Performed by: OBSTETRICS & GYNECOLOGY

## 2020-01-07 PROCEDURE — 81025 URINE PREGNANCY TEST: CPT | Performed by: OBSTETRICS & GYNECOLOGY

## 2020-01-07 NOTE — PROGRESS NOTES
Assessment/Plan   Diagnoses and all orders for this visit:     screening encounter  -     Prenatal Panel; Future    Amenorrhea  -     POCT urine HCG     1  Pregnancy confirmation visit-patient seen today  She notes LMP 11/3/19 which would place her at 9 2 weeks  She notes 28 day cycles  Positive pregnancy test was noted on 19 which was confirmed today  She is taking prenatal vitamins  She denies any birth defects in her family, but her partner had a nephew with possible achondroplasia  They will check this diagnosis  Flu shot was recommended and she will get this at next visit  She was given medication sheet, nutrition in pregnancy, and CF/SMA testing and counseled about this  She is interested in sequential testing  She will follow-up in 1-2 weeks time for ultrasound and 1st prenatal visit  Laboratory sheet was given for prenatal panel and she will get this done in the interim  2  History of asthma-no current issues  3  Previous history of abnormal Pap-we will proceed with Pap smear next visit  4  Previous Nexplanon-removed May 2019, patient on Loestrin  until about 1 month prior to conception  Subjective   Patient ID: Noah Silva is a 21 y o  female  Vitals:    20 1416   BP: 112/70     Patient was seen today for pregnancy confirmation visit  Please see assessment plan for details        The following portions of the patient's history were reviewed and updated as appropriate: allergies, current medications, past family history, past medical history, past social history, past surgical history and problem list   Past Medical History:   Diagnosis Date    Abnormal Pap smear of cervix     Anemia     Asthma     Gonorrhea     Miscarriage     Nexplanon removal     last assessed 17    Urinary tract infection      Past Surgical History:   Procedure Laterality Date    TONSILLECTOMY       OB History    Para Term  AB Living   2 2 2     2 SAB TAB Ectopic Multiple Live Births         0 2      # Outcome Date GA Lbr Ronald/2nd Weight Sex Delivery Anes PTL Lv   2 Term 12/08/17 38w2d / 00:36 3260 g (7 lb 3 oz) M Vag-Spont EPI N MARGO   1 Term 12/20/13 40w0d   M    MARGO       Current Outpatient Medications:     albuterol (PROVENTIL HFA,VENTOLIN HFA) 90 mcg/act inhaler, Inhale 2 puffs every 6 (six) hours as needed for wheezing, Disp: , Rfl:     albuterol (PROVENTIL HFA,VENTOLIN HFA) 90 mcg/act inhaler, Inhale 1 puff every 6 (six) hours, Disp: , Rfl:     ibuprofen (MOTRIN) 600 mg tablet, Take 1 tablet by mouth every 6 (six) hours as needed for mild pain for up to 10 days, Disp: 30 tablet, Rfl: 0  Allergies   Allergen Reactions    Fruit Extracts      Category: Allergy;  Annotation - [id number]: pomegrand lotion has skin reaction, pomegrand fruit has itching and swelling     Social History     Socioeconomic History    Marital status: Single     Spouse name: None    Number of children: 1    Years of education: None    Highest education level: None   Occupational History    None   Social Needs    Financial resource strain: None    Food insecurity:     Worry: None     Inability: None    Transportation needs:     Medical: None     Non-medical: None   Tobacco Use    Smoking status: Never Smoker    Smokeless tobacco: Never Used   Substance and Sexual Activity    Alcohol use: Yes     Comment: social    Drug use: No    Sexual activity: Yes     Partners: Male     Birth control/protection: None   Lifestyle    Physical activity:     Days per week: None     Minutes per session: None    Stress: None   Relationships    Social connections:     Talks on phone: None     Gets together: None     Attends Alevism service: None     Active member of club or organization: None     Attends meetings of clubs or organizations: None     Relationship status: None    Intimate partner violence:     Fear of current or ex partner: None     Emotionally abused: None Physically abused: None     Forced sexual activity: None   Other Topics Concern    None   Social History Narrative    Always uses seat belt    Caffeine use    No Quaker beliefs     Family History   Problem Relation Age of Onset    Asthma Mother     Asthma Father     Cancer Family         mother and father side       Review of Systems   Constitutional: Negative for chills, diaphoresis, fatigue and fever  Respiratory: Negative for apnea, cough, chest tightness, shortness of breath and wheezing  Cardiovascular: Negative for chest pain, palpitations and leg swelling  Gastrointestinal: Negative for abdominal distention, abdominal pain, anal bleeding, constipation, diarrhea, nausea, rectal pain and vomiting  Genitourinary: Negative for difficulty urinating, dyspareunia, dysuria, frequency, hematuria, menstrual problem, pelvic pain, urgency, vaginal bleeding, vaginal discharge and vaginal pain  Musculoskeletal: Negative for arthralgias, back pain and myalgias  Skin: Negative for color change and rash  Neurological: Negative for dizziness, syncope, light-headedness, numbness and headaches  Hematological: Negative for adenopathy  Does not bruise/bleed easily  Psychiatric/Behavioral: Negative for dysphoric mood and sleep disturbance  The patient is not nervous/anxious          Objective   Physical Exam    Objective      /70 (BP Location: Left arm, Patient Position: Sitting, Cuff Size: Standard)   Wt 68 9 kg (152 lb)   LMP 11/03/2019   BMI 29 69 kg/m²     General:   alert and oriented, in no acute distress   Neck:    Breast:    Heart:    Lungs: Normal breathing noted   Abdomen: Nontender   Vulva:    Vagina:    Cervix:    Uterus:    Adnexa:    Rectum:     Psych:  Normal mood and affect   Skin:  Without obvious lesions   Eyes: symmetric, with normal movements and reactivity   Musculoskeletal:  Normal muscle tone and movements appreciated

## 2020-01-07 NOTE — PATIENT INSTRUCTIONS
Pregnancy   WHAT YOU NEED TO KNOW:   What do I need to know about pregnancy? A normal pregnancy lasts about 40 weeks  The first trimester lasts from your last period through the 12th week of pregnancy  The second trimester lasts from the 13th week of your pregnancy through the 23rd week  The third trimester lasts from your 24th week of pregnancy until your baby is born  If you know the date of your last period, your healthcare provider can estimate your due date  You may give birth to your baby any time from 37 weeks to 2 weeks after your due date  What is prenatal care? Prenatal care is a series of visits with your healthcare provider throughout your pregnancy  Prenatal care can help prevent problems during pregnancy and childbirth  At each prenatal visit, your healthcare provider will weigh you and check your blood pressure  Your healthcare provider will also check your baby's heartbeat and growth  You may also need the following at some visits:  · A pelvic exam  allows your healthcare provider to see your cervix (the bottom part of your uterus)  Your healthcare provider uses a speculum to gently open your vagina  He will check the size and shape of your uterus  · Blood tests  may be done to check for gestational diabetes and anemia (low iron level)  You may need other blood tests, such as blood type, Rh factor, or tests to check for birth defects  · A fetal ultrasound  shows pictures of your baby inside your uterus  It shows your baby's development  The movement and position of your baby can also be seen  Your healthcare provider may be able to tell you what your baby's gender is during the ultrasound  What can I do to have a healthy pregnancy? · Eat a variety of healthy foods  Healthy foods include fruits, vegetables, whole-grain breads, low-fat dairy foods, beans, lean meats, and fish  Drink liquids as directed  Ask how much liquid to drink each day and which liquids are best for you   Limit caffeine to less than 200 milligrams each day  Limit your intake of fish to 2 servings each week  Choose fish low in mercury such as canned light tuna, shrimp, crab, salmon, cod, or tilapia  Do not  eat fish high in mercury such as swordfish, tilefish, baudilio mackerel, and shark  · Take prenatal vitamins as directed  Your need for certain vitamins and minerals, such as folic acid, increases during pregnancy  Prenatal vitamins provide some of the extra vitamins and minerals you need  Prenatal vitamins may also help to decrease the risk of certain birth defects  · Ask how much weight you should gain during your pregnancy  Too much or too little weight gain can be unhealthy for you and your baby  · Talk to your healthcare provider about exercise  Moderate exercise can help you stay fit  Your healthcare provider will help you plan an exercise program that is safe for you during pregnancy  · Do not smoke  If you smoke, it is never too late to quit  Smoking increases your risk of a miscarriage and other health problems during your pregnancy  Smoking can cause your baby to be born too early or weigh less at birth  Ask your healthcare provider for information if you need help quitting  · Do not drink alcohol  Alcohol passes from your body to your baby through the placenta  It can affect your baby's brain development and cause fetal alcohol syndrome (FAS)  FAS is a group of conditions that causes mental, behavior, and growth problems  · Talk to your healthcare provider before you take any medicines  Many medicines may harm your baby if you take them when you are pregnant  Do not take any medicines, vitamins, herbs, or supplements without first talking to your healthcare provider  Never use illegal or street drugs (such as marijuana or cocaine) while you are pregnant  What body changes may happen during my pregnancy?    · Breast changes  you will experience include tenderness and tingling during the early part of your pregnancy  Your breasts will become larger  You may need to use a support bra  You may see a thin, yellow fluid, called colostrum, leak from your nipples during the second trimester  Colostrum is a liquid that changes to milk about 3 days after you give birth  · Skin changes and stretch marks  may occur during your pregnancy  You may have red marks, called stretch marks, on your skin  Stretch marks will usually fade after pregnancy  Use lotion if your skin is dry and itchy  The skin on your face, around your nipples, and below your belly button may darken  Most of the time, your skin will return to its normal color after your baby is born  · Morning sickness  is nausea and vomiting that can happen at any time of day  Avoid fatty and spicy foods  Eat small meals throughout the day instead of large meals  Tiffanie may help to decrease nausea  Ask your healthcare provider about other ways of decreasing nausea and vomiting  · Heartburn  may be caused by changes in your hormones during pregnancy  Your growing uterus may also push your stomach upward and force stomach acid to back up into your esophagus  Eat 4 or 5 small meals each day instead of large meals  Avoid spicy foods  Avoid eating right before bedtime  · Constipation  may develop during your pregnancy  To treat constipation, eat foods high in fiber such as fiber cereals, beans, fruits, vegetables, whole-grain breads, and prune juice  Get regular exercise and drink plenty of water  Your healthcare provider may also suggest a fiber supplement to soften your bowel movements  Talk to your healthcare provider before you use any medicines to decrease constipation  · Hemorrhoids  are enlarged veins in the rectal area  They may cause pain, itching, and bright red bleeding from your rectum  To decrease your risk of hemorrhoids, prevent constipation and do not strain to have a bowel movement   If you have hemorrhoids, soak in a tub of warm water to ease discomfort  Ask your healthcare provider how you can treat hemorrhoids  · Leg cramps and swelling  may be caused by low calcium levels or the added weight of pregnancy  Raise your legs above the level of your heart to decrease swelling  During a leg cramp, stretch or massage the muscle that has the cramp  Heat may help decrease pain and muscle spasms  Apply heat on your muscle for 20 to 30 minutes every 2 hours for as many days as directed  · Back pain  may occur as your baby grows  Do not stand for long periods of time or lift heavy items  Use good posture while you stand, squat, or bend  Wear low-heeled shoes with good support  Rest may also help to relieve back pain  Ask your healthcare provider about exercises you can do to strengthen your back muscles  What are some safety tips during pregnancy? · Avoid hot tubs and saunas  Do not use a hot tub or sauna while you are pregnant, especially during your first trimester  Hot tubs and saunas may raise your baby's temperature and increase the risk of birth defects  · Avoid toxoplasmosis  This is an infection caused by eating raw meat or being around infected cat feces  It can cause birth defects, miscarriages, and other problems  Wash your hands after you touch raw meat  Make sure any meat is well-cooked before you eat it  Avoid raw eggs and unpasteurized milk  Use gloves or ask someone else to clean your cat's litter box while you are pregnant  · Ask your healthcare provider about travel  The most comfortable time to travel is during the second trimester  Ask your healthcare provider if you can travel after 36 weeks  You may not be able to travel in an airplane after 36 weeks  He may also recommend that you avoid long road trips  When should I seek immediate care? · You develop a severe headache that does not go away  · You have new or increased vision changes, such as blurred or spotted vision      · You have new or increased swelling in your face or hands  · You have pain or cramping in your abdomen or low back  · You have vaginal bleeding  When should I contact my healthcare provider? · You have abdominal cramps, pressure, or tightening  · You have a change in vaginal discharge  · You cannot keep food or drinks down, and you are losing weight  · You have chills or a fever  · You have vaginal itching, burning, or pain  · You have yellow, green, white, or foul-smelling vaginal discharge  · You have pain or burning when you urinate, less urine than usual, or pink or bloody urine  · You have questions or concerns about your condition or care  CARE AGREEMENT:   You have the right to help plan your care  Learn about your health condition and how it may be treated  Discuss treatment options with your caregivers to decide what care you want to receive  You always have the right to refuse treatment  The above information is an  only  It is not intended as medical advice for individual conditions or treatments  Talk to your doctor, nurse or pharmacist before following any medical regimen to see if it is safe and effective for you  © 2017 [employer]  Information is for End User's use only and may not be sold, redistributed or otherwise used for commercial purposes  All illustrations and images included in CareNotes® are the copyrighted property of [employer]  or [employer]

## 2020-01-16 ENCOUNTER — HOSPITAL ENCOUNTER (EMERGENCY)
Facility: HOSPITAL | Age: 25
Discharge: HOME | End: 2020-01-16
Attending: EMERGENCY MEDICINE | Admitting: EMERGENCY MEDICINE
Payer: COMMERCIAL

## 2020-01-16 VITALS
RESPIRATION RATE: 12 BRPM | SYSTOLIC BLOOD PRESSURE: 116 MMHG | TEMPERATURE: 98.6 F | OXYGEN SATURATION: 98 % | DIASTOLIC BLOOD PRESSURE: 82 MMHG | HEART RATE: 78 BPM

## 2020-01-16 DIAGNOSIS — M54.42 CHRONIC LEFT-SIDED LOW BACK PAIN WITH LEFT-SIDED SCIATICA: Primary | ICD-10-CM

## 2020-01-16 DIAGNOSIS — G89.29 CHRONIC LEFT-SIDED LOW BACK PAIN WITH LEFT-SIDED SCIATICA: Primary | ICD-10-CM

## 2020-01-16 DIAGNOSIS — Z34.90 PREGNANCY: ICD-10-CM

## 2020-01-16 PROCEDURE — 99283 EMERGENCY DEPT VISIT LOW MDM: CPT

## 2020-01-16 PROCEDURE — 99284 EMERGENCY DEPT VISIT MOD MDM: CPT | Performed by: PHYSICIAN ASSISTANT

## 2020-01-16 RX ORDER — LIDOCAINE 50 MG/G
1 PATCH TOPICAL ONCE
Status: DISCONTINUED | OUTPATIENT
Start: 2020-01-16 | End: 2020-01-16 | Stop reason: HOSPADM

## 2020-01-16 RX ORDER — CYCLOBENZAPRINE HCL 10 MG
10 TABLET ORAL 2 TIMES DAILY PRN
Qty: 8 TABLET | Refills: 0 | Status: SHIPPED | OUTPATIENT
Start: 2020-01-16 | End: 2020-02-26

## 2020-01-16 RX ADMIN — LIDOCAINE 1 PATCH: 50 PATCH TOPICAL at 14:59

## 2020-01-16 NOTE — ED PROVIDER NOTES
History  Chief Complaint   Patient presents with    Back Pain     left sided back pain since yesterday  this is a chronic issue for pt   Pt is 10 weeks pregnant      Pt is a 22 y/o female,  at 10 weeks pregnant, presents to the ED for evaluation of back pain for x1 day  Pt with chronic back pain over last 1 year after muscle strain, intermittent over past couple months  Pt states yesterday she woke up feeling like she slept wrong and began with left lower back pain with radiating into left leg  LMP 11/3/19  Patient sees Dr Jeanette Leyva for OBGYN  Patient has been taking Tylenol, last dose 8AM, and heat with only mild relief  Pt denies fever, chills, vaginal bleeding, abdominal pain, vaginal discharge, bladder/bowel incontinence, urinary retention, dysuria, hematuria  Prior to Admission Medications   Prescriptions Last Dose Informant Patient Reported? Taking? albuterol (PROVENTIL HFA,VENTOLIN HFA) 90 mcg/act inhaler   Yes No   Sig: Inhale 2 puffs every 6 (six) hours as needed for wheezing   albuterol (PROVENTIL HFA,VENTOLIN HFA) 90 mcg/act inhaler   Yes No   Sig: Inhale 1 puff every 6 (six) hours      Facility-Administered Medications: None       Past Medical History:   Diagnosis Date    Abnormal Pap smear of cervix     Anemia     Asthma     Gonorrhea     Miscarriage     Nexplanon removal     last assessed 17    Urinary tract infection        Past Surgical History:   Procedure Laterality Date    TONSILLECTOMY         Family History   Problem Relation Age of Onset    Asthma Mother     Asthma Father     Cancer Family         mother and father side     I have reviewed and agree with the history as documented  Social History     Tobacco Use    Smoking status: Never Smoker    Smokeless tobacco: Never Used   Substance Use Topics    Alcohol use: Yes     Comment: social    Drug use: No        Review of Systems   Constitutional: Negative for chills and fever     HENT: Negative for ear pain and sore throat  Eyes: Negative for redness  Respiratory: Negative for chest tightness and shortness of breath  Cardiovascular: Negative for chest pain, palpitations and leg swelling  Gastrointestinal: Negative for abdominal pain, diarrhea, nausea and vomiting  Genitourinary: Negative for dysuria, hematuria and vaginal bleeding  Musculoskeletal: Positive for back pain  Negative for neck pain  Skin: Negative for rash  Neurological: Negative for weakness and headaches  Psychiatric/Behavioral: Negative for confusion  Physical Exam  Physical Exam   Constitutional: She is oriented to person, place, and time  She appears well-developed and well-nourished  Non-toxic appearance  She does not have a sickly appearance  She does not appear ill  No distress  HENT:   Head: Normocephalic and atraumatic  Right Ear: External ear normal    Left Ear: External ear normal    Nose: Nose normal    Mouth/Throat: Uvula is midline, oropharynx is clear and moist and mucous membranes are normal    Eyes: Pupils are equal, round, and reactive to light  Conjunctivae and EOM are normal    Neck: Normal range of motion  Neck supple  Cardiovascular: Normal rate and regular rhythm  Pulmonary/Chest: Effort normal and breath sounds normal  No stridor  No respiratory distress  She has no decreased breath sounds  She has no wheezes  She has no rhonchi  She has no rales  Abdominal: Normal appearance  There is no tenderness   bpm   Musculoskeletal:        Lumbar back: She exhibits decreased range of motion (secondary to pain) and tenderness (left paraspinal)  She exhibits no bony tenderness (no midline)  Neurovascularly intact distally  5/5 strength bilateral lower extremities  Distal pulses intact  Cap refill <2 seconds  Sensation intact  Neurological: She is alert and oriented to person, place, and time  She has normal strength and normal reflexes  No sensory deficit   Gait normal  GCS eye subscore is 4  GCS verbal subscore is 5  GCS motor subscore is 6  Reflex Scores:       Patellar reflexes are 2+ on the right side and 2+ on the left side  Skin: Skin is warm and dry  Capillary refill takes less than 2 seconds  No rash noted  Psychiatric: She has a normal mood and affect  Her behavior is normal        Vital Signs  ED Triage Vitals [20 1323]   Temperature Pulse Respirations Blood Pressure SpO2   98 6 °F (37 °C) 78 12 116/82 98 %      Temp Source Heart Rate Source Patient Position - Orthostatic VS BP Location FiO2 (%)   Oral Monitor Sitting Right arm --      Pain Score       --           Vitals:    20 1323   BP: 116/82   Pulse: 78   Patient Position - Orthostatic VS: Sitting         Visual Acuity      ED Medications  Medications - No data to display    Diagnostic Studies  Results Reviewed     None                 No orders to display              Procedures  Procedures         ED Course  ED Course as of 2107   Thu 2020   1443 IUP and  bpm                                  MDM  Number of Diagnoses or Management Options  Chronic left-sided low back pain with left-sided sciatica: established and worsening  Pregnancy: new and requires workup  Diagnosis management comments: Pt is a 20 y/o female,  at 10 weeks pregnant, with history of chronic back pain x1 year ago after muscular strain, presents to the ED for evaluation of left lower back pain for x1 day  Patient without any vaginal bleeding or abdominal pain  IUP noted on ultrasound and fetal heart tones 124 beats per minute  Discussed with David Moscoso on-call, patient without any OB complaints at this time, recommended 4 day course of Flexeril and advised to take Tylenol for pain reduction  Lidoderm patch also applied in emergency department  Laborist on-call advised patient follow up with her OB as outpatient, patient states she has an appointment next week for her ultrasound    Advised patient follow up with her primary care physician regarding chronic back pain  Patient without any red flag symptoms at this time, no fevers, bladder/bowel incontinence, perianal numbness  Pt verbalizes understanding and agrees with plan  The management plan was discussed in detail with the patient at bedside and all questions were answered  Prior to discharge, I provided both verbal and written instructions  I discussed with the patient the signs and symptoms for which to return to the emergency department  All questions were answered and patient was comfortable with the plan of care and discharged to home  The patient verbalized understanding of our discussion and plan of care, and agrees to return to the Emergency Department for concerns and progression of illness  Disposition  Final diagnoses:   Pregnancy   Chronic left-sided low back pain with left-sided sciatica     Time reflects when diagnosis was documented in both MDM as applicable and the Disposition within this note     Time User Action Codes Description Comment    1/16/2020  3:00 PM Carvel Angela [L75 [id number]] Pregnancy     1/16/2020  3:00 PM Tessa Countess Add Segundo Leiva,  G89 29] Chronic left-sided low back pain with left-sided sciatica     1/16/2020  3:00 PM Royden Daft [V81 [id number]] Pregnancy     1/16/2020  3:00 PM Royden Daft [[id number],  G89 29] Chronic left-sided low back pain with left-sided sciatica       ED Disposition     ED Disposition Condition Date/Time Comment    Discharge Stable u Jan 16, 2020  3:00 PM Carolyn Cheek discharge to home/self care              Follow-up Information     Follow up With Specialties Details Why Contact Info Additional [address] [facility] Go to  If symptoms worsen [address]  [id number]  [address] [place] [address], [address] [facility] [place], [address] [place] [address], [id number] [phone]          Discharge Medication List as of 1/16/2020  3:01 PM      START taking these medications    Details   cyclobenzaprine (FLEXERIL) 10 mg tablet Take 1 tablet (10 mg total) by mouth 2 (two) times a day as needed for muscle spasms for up to 4 days, Starting Thu 1/16/2020, Until Mon 1/20/2020, Print         CONTINUE these medications which have NOT CHANGED    Details   !! albuterol (PROVENTIL HFA,VENTOLIN HFA) 90 mcg/act inhaler Inhale 2 puffs every 6 (six) hours as needed for wheezing, Until Discontinued, Historical Med      !! albuterol (PROVENTIL HFA,VENTOLIN HFA) 90 mcg/act inhaler Inhale 1 puff every 6 (six) hours, Historical Med       !! - Potential duplicate medications found  Please discuss with provider  No discharge procedures on file      ED Provider  Electronically Signed by           Jeanie Villalobos PA-C  01/16/20 2098

## 2020-01-23 ENCOUNTER — INITIAL PRENATAL (OUTPATIENT)
Dept: OBGYN CLINIC | Facility: CLINIC | Age: 25
End: 2020-01-23
Payer: COMMERCIAL

## 2020-01-23 ENCOUNTER — ULTRASOUND (OUTPATIENT)
Dept: OBGYN CLINIC | Facility: CLINIC | Age: 25
End: 2020-01-23
Payer: COMMERCIAL

## 2020-01-23 VITALS
WEIGHT: 150.2 LBS | DIASTOLIC BLOOD PRESSURE: 70 MMHG | BODY MASS INDEX: 29.33 KG/M2 | SYSTOLIC BLOOD PRESSURE: 118 MMHG | HEART RATE: 69 BPM

## 2020-01-23 DIAGNOSIS — Z3A.01 LESS THAN 8 WEEKS GESTATION OF PREGNANCY: ICD-10-CM

## 2020-01-23 DIAGNOSIS — Z36.9 ANTENATAL SCREENING ENCOUNTER: Primary | ICD-10-CM

## 2020-01-23 DIAGNOSIS — Z12.4 SCREENING FOR CERVICAL CANCER: ICD-10-CM

## 2020-01-23 DIAGNOSIS — Z11.3 SCREENING FOR STDS (SEXUALLY TRANSMITTED DISEASES): ICD-10-CM

## 2020-01-23 DIAGNOSIS — Z23 NEED FOR INFLUENZA VACCINATION: Primary | ICD-10-CM

## 2020-01-23 LAB
SL AMB  POCT GLUCOSE, UA: NORMAL
SL AMB LEUKOCYTE ESTERASE,UA: NORMAL
SL AMB POCT BILIRUBIN,UA: NORMAL
SL AMB POCT BLOOD,UA: NORMAL
SL AMB POCT CLARITY,UA: NORMAL
SL AMB POCT COLOR,UA: NORMAL
SL AMB POCT KETONES,UA: NORMAL
SL AMB POCT NITRITE,UA: NORMAL
SL AMB POCT PH,UA: NORMAL
SL AMB POCT SPECIFIC GRAVITY,UA: NORMAL
SL AMB POCT URINE PROTEIN: NORMAL
SL AMB POCT UROBILINOGEN: NORMAL

## 2020-01-23 PROCEDURE — 81002 URINALYSIS NONAUTO W/O SCOPE: CPT | Performed by: OBSTETRICS & GYNECOLOGY

## 2020-01-23 PROCEDURE — 76817 TRANSVAGINAL US OBSTETRIC: CPT | Performed by: OBSTETRICS & GYNECOLOGY

## 2020-01-23 PROCEDURE — 87591 N.GONORRHOEAE DNA AMP PROB: CPT | Performed by: OBSTETRICS & GYNECOLOGY

## 2020-01-23 PROCEDURE — G0145 SCR C/V CYTO,THINLAYER,RESCR: HCPCS | Performed by: OBSTETRICS & GYNECOLOGY

## 2020-01-23 PROCEDURE — 90686 IIV4 VACC NO PRSV 0.5 ML IM: CPT

## 2020-01-23 PROCEDURE — 90471 IMMUNIZATION ADMIN: CPT

## 2020-01-23 PROCEDURE — PNV: Performed by: OBSTETRICS & GYNECOLOGY

## 2020-01-23 PROCEDURE — 87491 CHLMYD TRACH DNA AMP PROBE: CPT | Performed by: OBSTETRICS & GYNECOLOGY

## 2020-01-23 NOTE — PROGRESS NOTES
Ultrasound performed for dates and viability  MFM packet given  Attempted to schedule NT scan, however no answer at 3 different [facility] locations  Patient will call to schedule
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
## 2020-01-24 LAB
C TRACH DNA SPEC QL NAA+PROBE: NEGATIVE
N GONORRHOEA DNA SPEC QL NAA+PROBE: NEGATIVE

## 2020-01-29 ENCOUNTER — HOSPITAL ENCOUNTER (EMERGENCY)
Facility: HOSPITAL | Age: 25
Discharge: HOME | End: 2020-01-29
Attending: EMERGENCY MEDICINE | Admitting: EMERGENCY MEDICINE
Payer: COMMERCIAL

## 2020-01-29 VITALS
SYSTOLIC BLOOD PRESSURE: 124 MMHG | WEIGHT: 145.5 LBS | RESPIRATION RATE: 18 BRPM | DIASTOLIC BLOOD PRESSURE: 78 MMHG | BODY MASS INDEX: 28.42 KG/M2 | HEART RATE: 70 BPM | TEMPERATURE: 97.9 F | OXYGEN SATURATION: 100 %

## 2020-01-29 DIAGNOSIS — M26.609 TMJ (TEMPOROMANDIBULAR JOINT DISORDER): Primary | ICD-10-CM

## 2020-01-29 DIAGNOSIS — Z34.90 PREGNANCY: ICD-10-CM

## 2020-01-29 LAB
LAB AP GYN PRIMARY INTERPRETATION: NORMAL
Lab: NORMAL

## 2020-01-29 PROCEDURE — 69209 REMOVE IMPACTED EAR WAX UNI: CPT | Performed by: EMERGENCY MEDICINE

## 2020-01-29 PROCEDURE — 99282 EMERGENCY DEPT VISIT SF MDM: CPT | Performed by: EMERGENCY MEDICINE

## 2020-01-29 PROCEDURE — 99282 EMERGENCY DEPT VISIT SF MDM: CPT

## 2020-01-29 NOTE — DISCHARGE INSTRUCTIONS
Continue Tylenol at home as needed for pain  Use over-the-counter mouth guard at night  Follow-up with ENT as soon as possible for further evaluation  Follow-up with dentist for monitoring of symptoms  Follow-up with OBGYN as scheduled today  Return to ED if symptoms worsen including increasing pain, drainage from the ear, swelling, erythema, vaginal bleeding or discharge, abdominal pain

## 2020-01-29 NOTE — ED PROVIDER NOTES
History  Chief Complaint   Patient presents with    Earache     left ear ache for four days, 9 weeks pregnant     Patient is a 53-year-old female  currently 8 weeks 6 days who presents with left ear pain for 4 days  Patient notes pain just anterior to her left ear intermittently last 4 days  She denies any drainage from the ear, swelling, erythema, proptosis, hearing change  She states the pain intermittently radiates into the left side of her head  She denies any headaches, vision changes, neck pain or stiffness, tooth pain  She has tried Tylenol at home, which provides some relief, but the pain returns  Patient states she is otherwise in her usual state of health  She denies any abdominal pain, nausea/vomiting different from her typical, vaginal bleeding or discharge, abdominal cramping  Patient also denies any fevers, chills, diaphoresis, congestion, cough, shortness of breath, chest pain, palpitations, diarrhea, urinary changes, or rash  Patient states that she had her confirming ultrasound last week and had a follow-up with her OBGYN  Patient states she also has an OBGYN appointment today  Prior to Admission Medications   Prescriptions Last Dose Informant Patient Reported? Taking?    albuterol (PROVENTIL HFA,VENTOLIN HFA) 90 mcg/act inhaler   Yes Yes   Sig: Inhale 2 puffs every 6 (six) hours as needed for wheezing   albuterol (PROVENTIL HFA,VENTOLIN HFA) 90 mcg/act inhaler Not Taking at Unknown time  Yes No   Sig: Inhale 1 puff every 6 (six) hours   cyclobenzaprine (FLEXERIL) 10 mg tablet   No No   Sig: Take 1 tablet (10 mg total) by mouth 2 (two) times a day as needed for muscle spasms for up to 4 days      Facility-Administered Medications: None       Past Medical History:   Diagnosis Date    Abnormal Pap smear of cervix     Anemia     Asthma     Gonorrhea     Miscarriage     Nexplanon removal     last assessed 17    Urinary tract infection        Past Surgical History: Procedure Laterality Date    TONSILLECTOMY         Family History   Problem Relation Age of Onset    Asthma Mother     Asthma Father     Cancer Family         mother and father side     I have reviewed and agree with the history as documented  Social History     Tobacco Use    Smoking status: Never Smoker    Smokeless tobacco: Never Used   Substance Use Topics    Alcohol use: Yes     Comment: social    Drug use: No        Review of Systems   Constitutional: Negative for chills, diaphoresis and fever  HENT: Positive for ear pain  Negative for congestion, ear discharge, rhinorrhea, sinus pressure and sore throat  Eyes: Negative for visual disturbance  Respiratory: Negative for cough, shortness of breath, wheezing and stridor  Cardiovascular: Negative for chest pain, palpitations and leg swelling  Gastrointestinal: Negative for abdominal pain, diarrhea, nausea and vomiting  Genitourinary: Negative for difficulty urinating, dysuria, hematuria, vaginal bleeding, vaginal discharge and vaginal pain  Musculoskeletal: Negative for myalgias, neck pain and neck stiffness  Skin: Negative for color change, pallor and rash  Neurological: Negative for dizziness, weakness, light-headedness, numbness and headaches  All other systems reviewed and are negative  Physical Exam  Physical Exam   Constitutional: She is oriented to person, place, and time  Vital signs are normal  She appears well-developed and well-nourished  She is active and cooperative  Non-toxic appearance  She does not have a sickly appearance  She does not appear ill  No distress  Patient appears well, no acute distress, nontoxic-appearing  HENT:   Head: Normocephalic and atraumatic  Right Ear: Hearing, tympanic membrane, external ear and ear canal normal    Left Ear: Hearing, tympanic membrane, external ear and ear canal normal  No drainage, swelling or tenderness  No mastoid tenderness   Tympanic membrane is not injected, not erythematous and not bulging  No decreased hearing is noted  Nose: Nose normal    Mouth/Throat: Uvula is midline, oropharynx is clear and moist and mucous membranes are normal  No oral lesions  No dental abscesses  No oropharyngeal exudate  No tonsillar exudate  Cerumen noted in left ear canal, which initially blocked TM  After cerumen removal, TM findings as indicated  Patient primarily tender just anterior to left ear along TMJ  Pain especially with opening and closing of the jaw, with mild intermittent clicking noted  Eyes: Pupils are equal, round, and reactive to light  Conjunctivae and EOM are normal    Neck: Normal range of motion  Neck supple  Cardiovascular: Normal rate, regular rhythm, normal heart sounds and intact distal pulses  Pulmonary/Chest: Effort normal and breath sounds normal  No stridor  No respiratory distress  She has no wheezes  Abdominal: Soft  Normal appearance and bowel sounds are normal  She exhibits no distension  There is no tenderness  Musculoskeletal: Normal range of motion  Lymphadenopathy:     She has no cervical adenopathy  Neurological: She is alert and oriented to person, place, and time  Skin: Skin is warm and dry  Capillary refill takes less than 2 seconds  She is not diaphoretic  Nursing note and vitals reviewed        Vital Signs  ED Triage Vitals [01/29/20 0636]   Temperature Pulse Respirations Blood Pressure SpO2   97 9 °F (36 6 °C) 70 18 124/78 100 %      Temp src Heart Rate Source Patient Position - Orthostatic VS BP Location FiO2 (%)   -- Monitor -- -- --      Pain Score       Worst Possible Pain           Vitals:    01/29/20 0636   BP: 124/78   Pulse: 70         Visual Acuity  Visual Acuity      Most Recent Value   L Pupil Size (mm)  3   R Pupil Size (mm)  3          ED Medications  Medications - No data to display    Diagnostic Studies  Results Reviewed     None                 No orders to display              Procedures  Ear cerumen removal  Date/Time: 1/29/2020 8:20 AM  Performed by: Charlotte Sheldon PA-C  Authorized by: Charlotte Sheldon PA-C     Patient location:  ED  Indications / Diagnosis:  Cerumen within ear canal blocking TM  Consent:     Consent obtained:  Verbal    Consent given by:  Patient    Risks discussed:  Bleeding, infection, pain, TM perforation, incomplete removal and dizziness    Alternatives discussed:  No treatment  Universal protocol:     Procedure explained and questions answered to patient or proxy's satisfaction: yes      Patient identity confirmed:  Verbally with patient  Procedure details:     Location:  L ear    Procedure type: irrigation only      Visualization (free text):  Yellow soft cerumen    Equipment used:  18 gauge catheter with 20 cc syringe, warm water  Post-procedure details:     Complication:  None    Hearing quality:  Normal    Patient tolerance of procedure: Tolerated well, no immediate complications             ED Course                               MDM  Number of Diagnoses or Management Options  Pregnancy:   TMJ (temporomandibular joint disorder):   Diagnosis management comments: Exam today consistent with TMJ, reviewed treatment at home  Recommended follow-up with ENT and dentist as soon as possible for further evaluation  Patient instructed to keep OBGYN appointment today as scheduled  Reviewed red flags symptoms and strict return to ED instructions  Patient notes understanding and agrees to plan          Disposition  Final diagnoses:   TMJ (temporomandibular joint disorder)   Pregnancy     Time reflects when diagnosis was documented in both MDM as applicable and the Disposition within this note     Time User Action Codes Description Comment    1/29/2020  8:17 AM Tamika Najera Add [M26 609] TMJ (temporomandibular joint disorder)     1/29/2020  8:18 AM Tamika Ahumada Add [P01 97] Pregnancy       ED Disposition     ED Disposition Condition Date/Time Comment    Discharge Stable Wed Jan 29, 2020  8:17 AM [address] discharge to home/self care  Follow-up Information     Follow up With Specialties Details Why [facility] Emergency Department Emergency Medicine  If symptoms worsen [facility] [id number] [phone] [place] [facility] ENT Otolaryngology Schedule an appointment as soon as possible for a visit   [address] [facility] [address]  [address] ENT, [address], [place], [id number] [phone]    [address] [place] [address]  Schedule an appointment as soon as possible for a visit   [address] [facility]  [phone]     Follow-up with your OBGYN today as scheduled               Discharge Medication List as of 1/29/2020  8:19 AM      CONTINUE these medications which have NOT CHANGED    Details   !! albuterol (PROVENTIL HFA,VENTOLIN HFA) 90 mcg/act inhaler Inhale 2 puffs every 6 (six) hours as needed for wheezing, Until Discontinued, Historical Med      !! albuterol (PROVENTIL HFA,VENTOLIN HFA) 90 mcg/act inhaler Inhale 1 puff every 6 (six) hours, Historical Med      cyclobenzaprine (FLEXERIL) 10 mg tablet Take 1 tablet (10 mg total) by mouth 2 (two) times a day as needed for muscle spasms for up to 4 days, Starting Thu 1/16/2020, Until Mon 1/20/2020, Print       !! - Potential duplicate medications found  Please discuss with provider  No discharge procedures on file      ED Provider  Electronically Signed by           Shirley Hardin PA-C  01/31/20 1956

## 2020-01-30 ENCOUNTER — TELEPHONE (OUTPATIENT)
Dept: OBGYN CLINIC | Facility: CLINIC | Age: 25
End: 2020-01-30

## 2020-01-30 NOTE — TELEPHONE ENCOUNTER
LM for patient in regards to her missed appointment for OB intake  Patient has an appointment with Dr Katie Olson on 2/20/20  Our office LM for patient that we would do the OB Intake before her Doctors appointment at 9:45 I stated if this time did not work for her to please call our office back

## 2020-02-13 ENCOUNTER — APPOINTMENT (OUTPATIENT)
Dept: LAB | Facility: HOSPITAL | Age: 25
End: 2020-02-13
Attending: OBSTETRICS & GYNECOLOGY
Payer: COMMERCIAL

## 2020-02-13 DIAGNOSIS — Z36.9 ANTENATAL SCREENING ENCOUNTER: ICD-10-CM

## 2020-02-13 LAB
ABO GROUP BLD: NORMAL
BASOPHILS # BLD AUTO: 0.03 THOUSANDS/ΜL (ref 0–0.1)
BASOPHILS NFR BLD AUTO: 0 % (ref 0–1)
BILIRUB UR QL STRIP: NEGATIVE
BLD GP AB SCN SERPL QL: NEGATIVE
CLARITY UR: CLEAR
COLOR UR: YELLOW
EOSINOPHIL # BLD AUTO: 0.02 THOUSAND/ΜL (ref 0–0.61)
EOSINOPHIL NFR BLD AUTO: 0 % (ref 0–6)
ERYTHROCYTE [DISTWIDTH] IN BLOOD BY AUTOMATED COUNT: 14 % (ref 11.6–15.1)
GLUCOSE UR STRIP-MCNC: NEGATIVE MG/DL
HCT VFR BLD AUTO: 38 % (ref 34.8–46.1)
HGB BLD-MCNC: 12.4 G/DL (ref 11.5–15.4)
HGB UR QL STRIP.AUTO: NEGATIVE
IMM GRANULOCYTES # BLD AUTO: 0.03 THOUSAND/UL (ref 0–0.2)
IMM GRANULOCYTES NFR BLD AUTO: 0 % (ref 0–2)
KETONES UR STRIP-MCNC: ABNORMAL MG/DL
LEUKOCYTE ESTERASE UR QL STRIP: NEGATIVE
LYMPHOCYTES # BLD AUTO: 2.43 THOUSANDS/ΜL (ref 0.6–4.47)
LYMPHOCYTES NFR BLD AUTO: 25 % (ref 14–44)
MCH RBC QN AUTO: 28 PG (ref 26.8–34.3)
MCHC RBC AUTO-ENTMCNC: 32.6 G/DL (ref 31.4–37.4)
MCV RBC AUTO: 86 FL (ref 82–98)
MONOCYTES # BLD AUTO: 0.44 THOUSAND/ΜL (ref 0.17–1.22)
MONOCYTES NFR BLD AUTO: 5 % (ref 4–12)
NEUTROPHILS # BLD AUTO: 6.65 THOUSANDS/ΜL (ref 1.85–7.62)
NEUTS SEG NFR BLD AUTO: 70 % (ref 43–75)
NITRITE UR QL STRIP: NEGATIVE
NRBC BLD AUTO-RTO: 0 /100 WBCS
PH UR STRIP.AUTO: 6 [PH]
PLATELET # BLD AUTO: 347 THOUSANDS/UL (ref 149–390)
PMV BLD AUTO: 9 FL (ref 8.9–12.7)
PROT UR STRIP-MCNC: NEGATIVE MG/DL
RBC # BLD AUTO: 4.43 MILLION/UL (ref 3.81–5.12)
RH BLD: POSITIVE
SP GR UR STRIP.AUTO: 1.02 (ref 1–1.03)
SPECIMEN EXPIRATION DATE: NORMAL
UROBILINOGEN UR QL STRIP.AUTO: 0.2 E.U./DL
WBC # BLD AUTO: 9.6 THOUSAND/UL (ref 4.31–10.16)

## 2020-02-13 PROCEDURE — 81003 URINALYSIS AUTO W/O SCOPE: CPT

## 2020-02-13 PROCEDURE — 80081 OBSTETRIC PANEL INC HIV TSTG: CPT

## 2020-02-13 PROCEDURE — 87086 URINE CULTURE/COLONY COUNT: CPT

## 2020-02-13 PROCEDURE — 36415 COLL VENOUS BLD VENIPUNCTURE: CPT

## 2020-02-14 LAB
BACTERIA UR CULT: NORMAL
HBV SURFACE AG SER QL: NORMAL
HIV 1+2 AB+HIV1 P24 AG SERPL QL IA: NORMAL
RPR SER QL: NORMAL
RUBV IGG SERPL IA-ACNC: 29.9 IU/ML

## 2020-02-20 ENCOUNTER — ROUTINE PRENATAL (OUTPATIENT)
Dept: OBGYN CLINIC | Facility: CLINIC | Age: 25
End: 2020-02-20
Payer: COMMERCIAL

## 2020-02-20 VITALS
HEART RATE: 83 BPM | BODY MASS INDEX: 28.74 KG/M2 | WEIGHT: 146.4 LBS | HEIGHT: 60 IN | DIASTOLIC BLOOD PRESSURE: 72 MMHG | SYSTOLIC BLOOD PRESSURE: 118 MMHG

## 2020-02-20 DIAGNOSIS — Z3A.12 12 WEEKS GESTATION OF PREGNANCY: Primary | ICD-10-CM

## 2020-02-20 LAB
SL AMB  POCT GLUCOSE, UA: NORMAL
SL AMB POCT URINE PROTEIN: NORMAL

## 2020-02-20 PROCEDURE — 81002 URINALYSIS NONAUTO W/O SCOPE: CPT | Performed by: OBSTETRICS & GYNECOLOGY

## 2020-02-20 PROCEDURE — PNV: Performed by: OBSTETRICS & GYNECOLOGY

## 2020-02-20 NOTE — PROGRESS NOTES
Patient was seen today for prenatal visit  1  12 0 weeks-good fetal heart tones noted  Fetal movement and threatened AB precautions were reviewed  Prenatal panel was okay  She is status post flu vaccine  She has MFM appointment next week for sequential testing  Follow-up 4 weeks time prenatal   2  Genetic-sequential as noted above  Patient sister's child with achondroplasia-to review with MFM at the visit  3  Headache-practical recommendations were reviewed  Tylenol was recommended

## 2020-02-20 NOTE — PATIENT INSTRUCTIONS
Pregnancy at [address] to [address]:   What changes are happening in my body? You are now at the end of your first trimester and entering your second trimester  Morning sickness usually goes away by this time  You may have other symptoms such as fatigue, frequent urination, and headaches  You may have gained between 2 to 4 pounds by now  How do I care for myself at this stage of my pregnancy? · Get plenty of rest   You may feel more tired than normal  You may need to take naps or go to bed earlier  · Manage nausea and vomiting  Avoid fatty and spicy foods  Eat small meals throughout the day instead of large meals  Tiffanie may help to decrease nausea  Ask your healthcare provider about other ways of decreasing nausea and vomiting  · Eat a variety of healthy foods  Healthy foods include fruits, vegetables, whole-grain breads, low-fat dairy foods, beans, lean meats, and fish  Drink liquids as directed  Ask how much liquid to drink each day and which liquids are best for you  Limit caffeine to less than 200 milligrams each day  Limit your intake of fish to 2 servings each week  Choose fish low in mercury such as canned light tuna, shrimp, salmon, cod, or tilapia  Do not  eat fish high in mercury such as swordfish, tilefish, baudilio mackerel, and shark  · Take prenatal vitamins as directed  Your need for certain vitamins and minerals, such as folic acid, increases during pregnancy  Prenatal vitamins provide some of the extra vitamins and minerals you need  Prenatal vitamins may also help to decrease the risk of certain birth defects  · Do not smoke  If you smoke, it is never too late to quit  Smoking increases your risk of a miscarriage and other health problems during your pregnancy  Smoking can cause your baby to be born too early or weigh less at birth  Ask your healthcare provider for information if you need help quitting  · Do not drink alcohol    Alcohol passes from your body to your baby through the placenta  It can affect your baby's brain development and cause fetal alcohol syndrome (FAS)  FAS is a group of conditions that causes mental, behavior, and growth problems  · Talk to your healthcare provider before you take any medicines  Many medicines may harm your baby if you take them when you are pregnant  Do not take any medicines, vitamins, herbs, or supplements without first talking to your healthcare provider  Never use illegal or street drugs (such as marijuana or cocaine) while you are pregnant  What are some safety tips during pregnancy? · Avoid hot tubs and saunas  Do not use a hot tub or sauna while you are pregnant, especially during your first trimester  Hot tubs and saunas may raise your baby's temperature and increase the risk of birth defects  · Avoid toxoplasmosis  This is an infection caused by eating raw meat or being around infected cat feces  It can cause birth defects, miscarriages, and other problems  Wash your hands after you touch raw meat  Make sure any meat is well-cooked before you eat it  Avoid raw eggs and unpasteurized milk  Use gloves or ask someone else to clean your cat's litter box while you are pregnant  What changes are happening with my baby? Your baby has fully formed fingernails and toenails  Your baby's heartbeat can now be heard  Ask your healthcare provider if you can listen to your baby's heartbeat  By week 14, your baby is over 4 inches long from the top of the head to the rump (baby's bottom)  Your baby weighs over 3 ounces  What do I need to know about prenatal care? During the first 28 weeks of your pregnancy, you will see your healthcare provider once a month  Prenatal care can help prevent problems during pregnancy and childbirth  Your healthcare provider will check your blood pressure and weight  You may also need any of the following:  · A urine test  may also be done to check for sugar and protein   These can be signs of gestational diabetes or infection  · Genetic disorders screening tests  may be offered to you  This screening test checks your baby's risk of genetic disorders such as Down syndrome  The screening test includes a blood test and ultrasound  · Your baby's heart rate  will be checked  When should I seek immediate care? · You have pain or cramping in your abdomen or low back  · You have heavy vaginal bleeding or clotting  · You pass material that looks like tissue or large clots  Collect the material and bring it with you  When should I contact my healthcare provider? · You cannot keep food or drinks down, and you are losing weight  · You have light bleeding  · You have chills or a fever  · You have vaginal itching, burning, or pain  · You have yellow, green, white, or foul-smelling vaginal discharge  · You have pain or burning when you urinate, less urine than usual, or pink or bloody urine  · You have questions or concerns about your condition or care  CARE AGREEMENT:   You have the right to help plan your care  Learn about your health condition and how it may be treated  Discuss treatment options with your caregivers to decide what care you want to receive  You always have the right to refuse treatment  The above information is an  only  It is not intended as medical advice for individual conditions or treatments  Talk to your doctor, nurse or pharmacist before following any medical regimen to see if it is safe and effective for you  © 2017 [address] Information is for End User's use only and may not be sold, redistributed or otherwise used for commercial purposes  All illustrations and images included in CareNotes® are the copyrighted property of [employer]  or [employer]

## 2020-02-25 ENCOUNTER — ROUTINE PRENATAL (OUTPATIENT)
Dept: PERINATAL CARE | Facility: OTHER | Age: 25
End: 2020-02-25
Payer: COMMERCIAL

## 2020-02-25 VITALS
HEIGHT: 60 IN | BODY MASS INDEX: 28.62 KG/M2 | HEART RATE: 88 BPM | SYSTOLIC BLOOD PRESSURE: 101 MMHG | DIASTOLIC BLOOD PRESSURE: 73 MMHG | WEIGHT: 145.8 LBS

## 2020-02-25 DIAGNOSIS — Z36.82 ENCOUNTER FOR ANTENATAL SCREENING FOR NUCHAL TRANSLUCENCY: Primary | ICD-10-CM

## 2020-02-25 DIAGNOSIS — Z3A.12 12 WEEKS GESTATION OF PREGNANCY: ICD-10-CM

## 2020-02-25 PROCEDURE — 76813 OB US NUCHAL MEAS 1 GEST: CPT | Performed by: OBSTETRICS & GYNECOLOGY

## 2020-02-25 PROCEDURE — 99212 OFFICE O/P EST SF 10 MIN: CPT | Performed by: OBSTETRICS & GYNECOLOGY

## 2020-02-25 NOTE — LETTER
February 25, 2020     Beata Bearden MD  [address] [facility]    Patient: Whitney Mari   YOB: 1995   Date of Visit: 2/25/2020       Dear Dr Damián Rodriguez: Thank you for referring Savi Mahmood to me for evaluation  Below are my notes for this consultation  If you have questions, please do not hesitate to call me  I look forward to following your patient along with you  Sincerely,        Tomasa Bueno MD        CC: No Recipients  Tomasa Bueno MD  2/25/2020 11:02 AM  Sign at close encounter  Please refer to the [facility] ultrasound report in Ob Procedures for additional information regarding the visit to the [facility]  today

## 2020-02-26 ENCOUNTER — HOSPITAL ENCOUNTER (EMERGENCY)
Facility: HOSPITAL | Age: 25
Discharge: HOME | End: 2020-02-26
Attending: EMERGENCY MEDICINE | Admitting: EMERGENCY MEDICINE
Payer: COMMERCIAL

## 2020-02-26 ENCOUNTER — TELEPHONE (OUTPATIENT)
Dept: OBGYN CLINIC | Facility: CLINIC | Age: 25
End: 2020-02-26

## 2020-02-26 VITALS
OXYGEN SATURATION: 100 % | HEART RATE: 83 BPM | RESPIRATION RATE: 16 BRPM | TEMPERATURE: 98.1 F | SYSTOLIC BLOOD PRESSURE: 109 MMHG | BODY MASS INDEX: 27.93 KG/M2 | WEIGHT: 143 LBS | DIASTOLIC BLOOD PRESSURE: 60 MMHG

## 2020-02-26 DIAGNOSIS — N39.0 UTI (URINARY TRACT INFECTION): ICD-10-CM

## 2020-02-26 DIAGNOSIS — R42 DIZZINESS: Primary | ICD-10-CM

## 2020-02-26 DIAGNOSIS — Z3A.13 13 WEEKS GESTATION OF PREGNANCY: ICD-10-CM

## 2020-02-26 LAB
ALBUMIN SERPL BCP-MCNC: 3.2 G/DL (ref 3.5–5)
ALP SERPL-CCNC: 51 U/L (ref 46–116)
ALT SERPL W P-5'-P-CCNC: 21 U/L (ref 12–78)
ANION GAP SERPL CALCULATED.3IONS-SCNC: 7 MMOL/L (ref 4–13)
AST SERPL W P-5'-P-CCNC: 8 U/L (ref 5–45)
ATRIAL RATE: 58 BPM
BACTERIA UR QL AUTO: ABNORMAL /HPF
BASOPHILS # BLD AUTO: 0.01 THOUSANDS/ΜL (ref 0–0.1)
BASOPHILS NFR BLD AUTO: 0 % (ref 0–1)
BILIRUB SERPL-MCNC: 0.26 MG/DL (ref 0.2–1)
BILIRUB UR QL STRIP: NEGATIVE
BUN SERPL-MCNC: 9 MG/DL (ref 5–25)
CALCIUM SERPL-MCNC: 8.7 MG/DL (ref 8.3–10.1)
CHLORIDE SERPL-SCNC: 103 MMOL/L (ref 100–108)
CLARITY UR: CLEAR
CO2 SERPL-SCNC: 27 MMOL/L (ref 21–32)
COLOR UR: ABNORMAL
CREAT SERPL-MCNC: 0.53 MG/DL (ref 0.6–1.3)
EOSINOPHIL # BLD AUTO: 0.04 THOUSAND/ΜL (ref 0–0.61)
EOSINOPHIL NFR BLD AUTO: 1 % (ref 0–6)
ERYTHROCYTE [DISTWIDTH] IN BLOOD BY AUTOMATED COUNT: 14.1 % (ref 11.6–15.1)
GFR SERPL CREATININE-BSD FRML MDRD: 132 ML/MIN/1.73SQ M
GLUCOSE SERPL-MCNC: 86 MG/DL (ref 65–140)
GLUCOSE UR STRIP-MCNC: NEGATIVE MG/DL
HCT VFR BLD AUTO: 36.6 % (ref 34.8–46.1)
HGB BLD-MCNC: 11.9 G/DL (ref 11.5–15.4)
HGB UR QL STRIP.AUTO: NEGATIVE
IMM GRANULOCYTES # BLD AUTO: 0.02 THOUSAND/UL (ref 0–0.2)
IMM GRANULOCYTES NFR BLD AUTO: 0 % (ref 0–2)
KETONES UR STRIP-MCNC: NEGATIVE MG/DL
LEUKOCYTE ESTERASE UR QL STRIP: ABNORMAL
LYMPHOCYTES # BLD AUTO: 1.65 THOUSANDS/ΜL (ref 0.6–4.47)
LYMPHOCYTES NFR BLD AUTO: 20 % (ref 14–44)
MCH RBC QN AUTO: 28.3 PG (ref 26.8–34.3)
MCHC RBC AUTO-ENTMCNC: 32.5 G/DL (ref 31.4–37.4)
MCV RBC AUTO: 87 FL (ref 82–98)
MONOCYTES # BLD AUTO: 0.39 THOUSAND/ΜL (ref 0.17–1.22)
MONOCYTES NFR BLD AUTO: 5 % (ref 4–12)
MUCOUS THREADS UR QL AUTO: ABNORMAL
NEUTROPHILS # BLD AUTO: 6.29 THOUSANDS/ΜL (ref 1.85–7.62)
NEUTS SEG NFR BLD AUTO: 74 % (ref 43–75)
NITRITE UR QL STRIP: NEGATIVE
NON-SQ EPI CELLS URNS QL MICRO: ABNORMAL /HPF
NRBC BLD AUTO-RTO: 0 /100 WBCS
P AXIS: -10 DEGREES
PH UR STRIP.AUTO: 6 [PH] (ref 4.5–8)
PLATELET # BLD AUTO: 292 THOUSANDS/UL (ref 149–390)
PMV BLD AUTO: 9.2 FL (ref 8.9–12.7)
POTASSIUM SERPL-SCNC: 3.9 MMOL/L (ref 3.5–5.3)
PR INTERVAL: 168 MS
PROT SERPL-MCNC: 6.7 G/DL (ref 6.4–8.2)
PROT UR STRIP-MCNC: ABNORMAL MG/DL
QRS AXIS: 103 DEGREES
QRSD INTERVAL: 102 MS
QT INTERVAL: 410 MS
QTC INTERVAL: 402 MS
RBC # BLD AUTO: 4.2 MILLION/UL (ref 3.81–5.12)
RBC #/AREA URNS AUTO: ABNORMAL /HPF
SODIUM SERPL-SCNC: 137 MMOL/L (ref 136–145)
SP GR UR STRIP.AUTO: 1.02 (ref 1–1.03)
T WAVE AXIS: 62 DEGREES
TROPONIN I SERPL-MCNC: <0.02 NG/ML
UROBILINOGEN UR QL STRIP.AUTO: 0.2 E.U./DL
VENTRICULAR RATE: 58 BPM
WBC # BLD AUTO: 8.4 THOUSAND/UL (ref 4.31–10.16)
WBC #/AREA URNS AUTO: ABNORMAL /HPF

## 2020-02-26 PROCEDURE — 99284 EMERGENCY DEPT VISIT MOD MDM: CPT

## 2020-02-26 PROCEDURE — 84484 ASSAY OF TROPONIN QUANT: CPT | Performed by: EMERGENCY MEDICINE

## 2020-02-26 PROCEDURE — 96361 HYDRATE IV INFUSION ADD-ON: CPT

## 2020-02-26 PROCEDURE — 36415 COLL VENOUS BLD VENIPUNCTURE: CPT | Performed by: EMERGENCY MEDICINE

## 2020-02-26 PROCEDURE — 85025 COMPLETE CBC W/AUTO DIFF WBC: CPT | Performed by: EMERGENCY MEDICINE

## 2020-02-26 PROCEDURE — 99283 EMERGENCY DEPT VISIT LOW MDM: CPT | Performed by: EMERGENCY MEDICINE

## 2020-02-26 PROCEDURE — 80053 COMPREHEN METABOLIC PANEL: CPT | Performed by: EMERGENCY MEDICINE

## 2020-02-26 PROCEDURE — 93005 ELECTROCARDIOGRAM TRACING: CPT

## 2020-02-26 PROCEDURE — 81001 URINALYSIS AUTO W/SCOPE: CPT

## 2020-02-26 PROCEDURE — 93010 ELECTROCARDIOGRAM REPORT: CPT | Performed by: INTERNAL MEDICINE

## 2020-02-26 PROCEDURE — 87086 URINE CULTURE/COLONY COUNT: CPT

## 2020-02-26 PROCEDURE — 96360 HYDRATION IV INFUSION INIT: CPT

## 2020-02-26 RX ORDER — CEPHALEXIN 500 MG/1
500 CAPSULE ORAL 4 TIMES DAILY
Qty: 20 CAPSULE | Refills: 0 | Status: SHIPPED | OUTPATIENT
Start: 2020-02-26 | End: 2020-03-04

## 2020-02-26 RX ADMIN — SODIUM CHLORIDE 1000 ML: 0.9 INJECTION, SOLUTION INTRAVENOUS at 11:52

## 2020-02-26 NOTE — ED PROVIDER NOTES
History  Chief Complaint   Patient presents with    Dizziness     Patient was standing at work today and had near syncopal episode  Began feeling dizzy and seeing spots at the time  Patient is 13 weeks pregnant  Pt  Is 13 weeks pregnant  [facility] 9/3/20)  Had a normal pelvic ultrasound yest  Was standing at work and felt tingling in her feet and felt a heat flash and felt like she was going to pass out  She sat herself down  Eventually symptoms resolved  She has a slight headache  Pt  Is eating and drinking okay  No fevers, no cough, no congestion  Pt  Ate this am      No abd  Pain or vaginal bleeding  Prior to Admission Medications   Prescriptions Last Dose Informant Patient Reported? Taking? Prenatal Vit w/Ca-Qqidqkyya-CR (PNV PO) 2/26/2020 at Unknown time Self Yes Yes   Sig: Take 1 tablet by mouth daily   albuterol (PROVENTIL HFA,VENTOLIN HFA) 90 mcg/act inhaler More than a month at Unknown time Self Yes No   Sig: Inhale 2 puffs every 6 (six) hours as needed for wheezing      Facility-Administered Medications: None       Past Medical History:   Diagnosis Date    Abnormal Pap smear of cervix     Anemia     Asthma     Gonorrhea     Miscarriage     Nexplanon removal     last assessed 02/07/17    Urinary tract infection        Past Surgical History:   Procedure Laterality Date    TONSILLECTOMY         Family History   Problem Relation Age of Onset    Asthma Mother     Asthma Father     Cancer Family         mother and father side    No Known Problems Son     No Known Problems Half-Sister     No Known Problems Half-Sister     No Known Problems Half-Sister     Asthma Half-Brother     No Known Problems Half-Brother     No Known Problems Son      I have reviewed and agree with the history as documented      E-Cigarette/Vaping    E-Cigarette Use Never User      E-Cigarette/Vaping Substances     Social History     Tobacco Use    Smoking status: Never Smoker    Smokeless tobacco: Never Used   Substance Use Topics    Alcohol use: Yes     Comment: social    Drug use: No       Review of Systems   Constitutional: Negative for appetite change, fatigue and fever  HENT: Negative for rhinorrhea and sore throat  Respiratory: Negative for cough, shortness of breath and wheezing  Cardiovascular: Negative for chest pain and leg swelling  Gastrointestinal: Negative for abdominal pain, diarrhea and vomiting  Genitourinary: Negative for dysuria and flank pain  Musculoskeletal: Negative for back pain and neck pain  Skin: Negative for rash  Neurological: Positive for dizziness  Negative for syncope and headaches  Psychiatric/Behavioral:        Mood normal       Physical Exam  Physical Exam   Constitutional: She is oriented to person, place, and time  She appears well-developed and well-nourished  HENT:   Head: Normocephalic and atraumatic  Mouth/Throat: Oropharynx is clear and moist    Eyes: Pupils are equal, round, and reactive to light  Neck: Normal range of motion  Neck supple  Cardiovascular: Normal rate and regular rhythm  Pulmonary/Chest: Effort normal and breath sounds normal    Abdominal: Soft  There is no tenderness  Musculoskeletal: Normal range of motion  Neurological: She is alert and oriented to person, place, and time  No cranial nerve deficit  Skin: Skin is warm and dry  Nursing note and vitals reviewed        Vital Signs  ED Triage Vitals   Temperature Pulse Respirations Blood Pressure SpO2   02/26/20 1022 02/26/20 1022 02/26/20 1022 02/26/20 1022 02/26/20 1022   98 1 °F (36 7 °C) 79 16 119/69 99 %      Temp Source Heart Rate Source Patient Position - Orthostatic VS BP Location FiO2 (%)   02/26/20 1022 02/26/20 1022 02/26/20 1151 02/26/20 1151 --   Oral Monitor Lying Right arm       Pain Score       02/26/20 1022       No Pain           Vitals:    02/26/20 1022 02/26/20 1151 02/26/20 1329   BP: 119/69 109/58 109/60   Pulse: 79 59 83   Patient Position - Orthostatic VS:  Lying          Visual Acuity      ED Medications  Medications   sodium chloride 0 9 % bolus 1,000 mL (0 mL Intravenous Stopped 2/26/20 1328)       Diagnostic Studies  Results Reviewed     Procedure Component Value Units Date/Time    Comprehensive metabolic panel [[id number]]  (Abnormal) Collected:  02/26/20 1151    Lab Status:  Final result Specimen:  Blood from Arm, Left Updated:  02/26/20 1239     Sodium 137 mmol/L      Potassium 3 9 mmol/L      Chloride 103 mmol/L      CO2 27 mmol/L      ANION GAP 7 mmol/L      BUN 9 mg/dL      Creatinine 0 53 mg/dL      Glucose 86 mg/dL      Calcium 8 7 mg/dL      AST 8 U/L      ALT 21 U/L      Alkaline Phosphatase 51 U/L      Total Protein 6 7 g/dL      Albumin 3 2 g/dL      Total Bilirubin 0 26 mg/dL      eGFR 132 ml/min/1 73sq m     Narrative:       Stu guidelines for Chronic Kidney Disease (CKD):     Stage 1 with normal or high GFR (GFR > 90 mL/min/1 73 square meters)    Stage 2 Mild CKD (GFR = 60-89 mL/min/1 73 square meters)    Stage 3A Moderate CKD (GFR = 45-59 mL/min/1 73 square meters)    Stage 3B Moderate CKD (GFR = 30-44 mL/min/1 73 square meters)    Stage 4 Severe CKD (GFR = 15-29 mL/min/1 73 square meters)    Stage 5 End Stage CKD (GFR <15 mL/min/1 73 square meters)  Note: GFR calculation is accurate only with a steady state creatinine    Troponin I [[id number]]  (Normal) Collected:  02/26/20 1151    Lab Status:  Final result Specimen:  Blood from Arm, Left Updated:  02/26/20 1226     Troponin I <0 02 ng/mL     CBC and differential [[id number]] Collected:  02/26/20 1151    Lab Status:  Final result Specimen:  Blood from Arm, Left Updated:  02/26/20 1158     WBC 8 40 Thousand/uL      RBC 4 20 Million/uL      Hemoglobin 11 9 g/dL      Hematocrit 36 6 %      MCV 87 fL      MCH 28 3 pg      MCHC 32 5 g/dL      RDW 14 1 %      MPV 9 2 fL      Platelets 072 Thousands/uL      nRBC 0 /100 WBCs      Neutrophils Relative 74 % Immat GRANS % 0 %      Lymphocytes Relative 20 %      Monocytes Relative 5 %      Eosinophils Relative 1 %      Basophils Relative 0 %      Neutrophils Absolute 6 29 Thousands/µL      Immature Grans Absolute 0 02 Thousand/uL      Lymphocytes Absolute 1 65 Thousands/µL      Monocytes Absolute 0 39 Thousand/µL      Eosinophils Absolute 0 04 Thousand/µL      Basophils Absolute 0 01 Thousands/µL     Urine Microscopic [[id number]]  (Abnormal) Collected:  02/26/20 1042    Lab Status:  Final result Specimen:  Urine, Clean Catch Updated:  02/26/20 1130     RBC, UA None Seen /hpf      WBC, UA 10-20 /hpf      Epithelial Cells Moderate /hpf      Bacteria, UA Moderate /hpf      MUCUS THREADS Occasional    Urine culture [[id number]] Collected:  02/26/20 1042    Lab Status: In process Specimen:  Urine, Clean Catch Updated:  02/26/20 1050    Urine Macroscopic, POC [[id number]]  (Abnormal) Collected:  02/26/20 1042    Lab Status:  Final result Specimen:  Urine Updated:  02/26/20 1043     Color, UA Angela     Clarity, UA Clear     pH, UA 6 0     Leukocytes, UA Trace     Nitrite, UA Negative     Protein, UA Trace mg/dl      Glucose, UA Negative mg/dl      Ketones, UA Negative mg/dl      Urobilinogen, UA 0 2 E U /dl      Bilirubin, UA Negative     Blood, UA Negative     Specific Gravity, UA 1 025    Narrative:       CLINITEK RESULT                 No orders to display              Procedures  Procedures         ED Course                               MDM  Number of Diagnoses or Management Options  13 weeks gestation of pregnancy:   Dizziness:   UTI (urinary tract infection):      Amount and/or Complexity of Data Reviewed  Clinical lab tests: ordered and reviewed    Risk of Complications, Morbidity, and/or Mortality  Presenting problems: moderate  General comments: Pt  [place] better in ER  Stable for outpt   Follow up    Bedside US +IUP with a FHR in the 160's          Disposition  Final diagnoses:   Dizziness   13 weeks gestation of pregnancy   UTI (urinary tract infection)     Time reflects when diagnosis was documented in both MDM as applicable and the Disposition within this note     Time User Action Codes Description Comment    2/26/2020  1:10 PM Brittni León R Add [R42] Dizziness     2/26/2020  1:10 PM Compa León Cea Add [Z3A 13] 13 weeks gestation of pregnancy     2/26/2020  1:11 PM Compa León Cea Add [N39 0] UTI (urinary tract infection)       ED Disposition     ED Disposition Condition Date/Time Comment    Discharge Stable Wed Feb 26, 2020  1:10 PM [address] [facility] [address] discharge to home/self care  Follow-up Information     Follow up With Specialties Details Why Contact Info Additional [address] Obstetrics and Gynecology   [address] [facility] [address]  [address]  [place] [id number]  [facility], [address] [facility] [address] [facility], [address] [place] [address], [address] [phone]          Discharge Medication List as of 2/26/2020  1:13 PM      START taking these medications    Details   cephalexin (KEFLEX) 500 mg capsule Take 1 capsule (500 mg total) by mouth 4 (four) times a day for 7 days, Starting Wed 2/26/2020, Until Wed 3/4/2020, Normal         CONTINUE these medications which have NOT CHANGED    Details   Prenatal Vit w/Sj-Fdghyutny-PY (PNV PO) Take 1 tablet by mouth daily, Historical Med      albuterol (PROVENTIL HFA,VENTOLIN HFA) 90 mcg/act inhaler Inhale 2 puffs every 6 (six) hours as needed for wheezing, Historical Med           No discharge procedures on file      PDMP Review     None          ED Provider  Electronically Signed by           Tomeka Rubalcava MD  02/26/20 3831

## 2020-02-27 LAB — BACTERIA UR CULT: NORMAL

## 2020-03-09 ENCOUNTER — TELEPHONE (OUTPATIENT)
Dept: PERINATAL CARE | Facility: CLINIC | Age: 25
End: 2020-03-09

## 2020-03-09 NOTE — TELEPHONE ENCOUNTER
----- Message from Abby Moreno MD sent at 3/9/2020 12:54 PM EDT -----  I reviewed the lab study today and the results are normal

## 2020-03-09 NOTE — LETTER
03/09/20  Thiernodane Going Gomez  1995    Thank you for completing Part 1 of your Sequential Screen  To obtain a complete test result, please complete blood work for Part 2 Sequential Screen between the weeks of 3/20/20 to 4/3/20  Based on your insurance coverage, please use one of the following locations  Call our office for any questions at [phone]      [address] [facility] [address]  [address] [facility], [facility], [address]   [address] [facility], [place], [address]  Phone:  [phone]     Phone:  [phone]    [facility]  [facility] 6 [facility], [address] [facility], [address] [place] [address]  Phone: [phone]      Phone: [phone] Chantel Carver for lab)    [address] [facility]  59 [facility], [facility], [address] [place] [address]   [address] [facility], [place], [facility] [address]  Phone: [phone]      Phone:  [phone]    [address]  [address] [facility] 6   [address]  Phone: [phone]      Phone:  [address] [facility],[address]  207 [facility], [facility], [address] [facility]   [facility], [address]  Phone: [phone]      Phone: [phone]    [facility] [place]     [address] [facility]  1430 [facility], [place], [address], [place], [address]  Phone:  [phone]     Phone: [phone]    [address] [place] [address] [address]  Phone: [phone]    Sincerely,    Poli Levy RN

## 2020-03-09 NOTE — TELEPHONE ENCOUNTER
I attempted to call Jose G Hoffmann on the number listed on her communication consent to notify her of the results of her part 1 sequential screen  However, there was no answer and "the mailbox was full " TRF mailed for part 2 sequential screen

## 2020-03-17 ENCOUNTER — INITIAL PRENATAL (OUTPATIENT)
Dept: OBGYN CLINIC | Facility: CLINIC | Age: 25
End: 2020-03-17

## 2020-03-17 ENCOUNTER — ROUTINE PRENATAL (OUTPATIENT)
Dept: OBGYN CLINIC | Facility: CLINIC | Age: 25
End: 2020-03-17
Payer: COMMERCIAL

## 2020-03-17 VITALS
HEIGHT: 60 IN | SYSTOLIC BLOOD PRESSURE: 100 MMHG | DIASTOLIC BLOOD PRESSURE: 70 MMHG | BODY MASS INDEX: 29.29 KG/M2 | WEIGHT: 149.2 LBS

## 2020-03-17 DIAGNOSIS — Z34.82 PRENATAL CARE, SUBSEQUENT PREGNANCY, SECOND TRIMESTER: Primary | ICD-10-CM

## 2020-03-17 DIAGNOSIS — Z3A.15 15 WEEKS GESTATION OF PREGNANCY: Primary | ICD-10-CM

## 2020-03-17 LAB
SL AMB  POCT GLUCOSE, UA: NEGATIVE
SL AMB POCT URINE PROTEIN: NEGATIVE

## 2020-03-17 PROCEDURE — 81002 URINALYSIS NONAUTO W/O SCOPE: CPT | Performed by: OBSTETRICS & GYNECOLOGY

## 2020-03-17 PROCEDURE — PNV: Performed by: OBSTETRICS & GYNECOLOGY

## 2020-03-17 PROCEDURE — OBC: Performed by: OBSTETRICS & GYNECOLOGY

## 2020-03-17 NOTE — PROGRESS NOTES
IUP at 15 weeks and 5 days  Patient feels well blood pressure is normal more weight gain is appropriate her urine dip is negative negative  Initial part sequential screen testing has been completed  She will complete the 2nd part within the next 2 weeks  Reviewed signs of  labor and kick counts follow-up in 4 weeks and p r n

## 2020-03-17 NOTE — PROGRESS NOTES
OB INTAKE INTERVIEW  * Pt presents for OB intake  * Accompanied by: self  *  *Pt's LMP was 11/3/2019  *Ultrasound date:2020   8weeks 0days  *Estimated date of delivery: 9/3/2020   * confirmed by US    *Signs/Symptoms of Pregnancy   *No constipation    *Yes headaches   *No cramping/spotting    *No PICA cravings   *Diabetes  If any of the following answers are  yes, please order 1 hour GTT, 50g   *No hx of GDM    *No BMI >35    *No first degree relative with type 2 diabetes    *No hx of PCOS   *No current metformin use    *No prior hx of LGA/macrosomia    *No AMA with other risk factors   *Hypertension- If any of the following answers are yes, please order preeclampsia labs including 24 hour urine protein   *No Hx of chronic HTN    *No Hx of gestational HTN   *No hx of preeclampsia, eclampsia, or HELLP syndrome   *Infection Screening   *No Does the pt have a hx of MRSA? *if yes- follow MRSA protocol and obtain a nasal swab for MRSA culture   *No history of herpes?    *Immunizations:   *Yes influenza vaccine given today 2020   *Yes  discussed Tdap vaccine  *Previous Delivery Complications:   *No  delivery   *No Previous     *Interview education   *Handouts given:    *Baby and Me phone lu guide    *Baby and Me support center    *Baby and Me Lu    *New Babies and [address] sign up instructions    *Lab Locations    *[facility] List    *Nicol Miller [address] Childbirth and Parenting Classes    *Pre-Birth Registration form completed    *Schedule for prenatal visits    *Schedule for ultrasounds    *Pregnancy Warning Signs reviewed    *Safe Medications During Pregnancy    *  [facility]    *Advanced Pregnancy Guidelines    *Perineal Massage        *[facility]   *Yes discussed genetic testing- pt interested    *Yes appointment at [facility] 2020 for Level II        Patient has been informed of basic prenatal advice such as avoiding alcohol, drugs, and smoking  She should remain hydrated and take daily prenatal vitamins  Patient should avoid caffeine, raw sprouts, high mercury fish, undercooked fish, raw eggs, organ meat, unwashed produce, and unpasteurized cheeses, milk, and fruit juice  She has been informed about toxoplasmosis and to avoid cat feces and undercooked meats  *I have these concerns about this prenatal patient: None  *Details that I feel the provider should be aware of: Father of Baby sister with achondroplasia  PN1 visit scheduled  The patient was oriented to our practice and all questions were answered      Interviewed by:  Lei Covington RDMS

## 2020-04-15 ENCOUNTER — TELEPHONE (OUTPATIENT)
Dept: PERINATAL CARE | Facility: CLINIC | Age: 25
End: 2020-04-15

## 2020-04-16 ENCOUNTER — TELEMEDICINE (OUTPATIENT)
Dept: OBGYN CLINIC | Facility: CLINIC | Age: 25
End: 2020-04-16

## 2020-04-16 ENCOUNTER — ROUTINE PRENATAL (OUTPATIENT)
Dept: PERINATAL CARE | Facility: OTHER | Age: 25
End: 2020-04-16
Payer: COMMERCIAL

## 2020-04-16 VITALS
DIASTOLIC BLOOD PRESSURE: 75 MMHG | WEIGHT: 151 LBS | HEIGHT: 60 IN | HEART RATE: 76 BPM | SYSTOLIC BLOOD PRESSURE: 121 MMHG | BODY MASS INDEX: 29.64 KG/M2

## 2020-04-16 DIAGNOSIS — Z36.3 ENCOUNTER FOR ANTENATAL SCREENING FOR MALFORMATIONS: ICD-10-CM

## 2020-04-16 DIAGNOSIS — O44.02 PLACENTA PREVIA, SECOND TRIMESTER: Primary | ICD-10-CM

## 2020-04-16 DIAGNOSIS — Z3A.20 20 WEEKS GESTATION OF PREGNANCY: ICD-10-CM

## 2020-04-16 PROCEDURE — 76817 TRANSVAGINAL US OBSTETRIC: CPT | Performed by: OBSTETRICS & GYNECOLOGY

## 2020-04-16 PROCEDURE — 76805 OB US >/= 14 WKS SNGL FETUS: CPT | Performed by: OBSTETRICS & GYNECOLOGY

## 2020-04-16 PROCEDURE — 99212 OFFICE O/P EST SF 10 MIN: CPT | Performed by: OBSTETRICS & GYNECOLOGY

## 2020-04-16 PROCEDURE — PNV: Performed by: OBSTETRICS & GYNECOLOGY

## 2020-05-15 ENCOUNTER — TELEMEDICINE (OUTPATIENT)
Dept: OBGYN CLINIC | Facility: CLINIC | Age: 25
End: 2020-05-15

## 2020-05-15 DIAGNOSIS — Z3A.24 24 WEEKS GESTATION OF PREGNANCY: Primary | ICD-10-CM

## 2020-05-15 PROCEDURE — PNV: Performed by: OBSTETRICS & GYNECOLOGY

## 2020-06-05 ENCOUNTER — ROUTINE PRENATAL (OUTPATIENT)
Dept: OBGYN CLINIC | Facility: CLINIC | Age: 25
End: 2020-06-05
Payer: COMMERCIAL

## 2020-06-05 VITALS
WEIGHT: 159 LBS | BODY MASS INDEX: 31.22 KG/M2 | DIASTOLIC BLOOD PRESSURE: 58 MMHG | TEMPERATURE: 97.8 F | SYSTOLIC BLOOD PRESSURE: 110 MMHG | HEIGHT: 60 IN

## 2020-06-05 DIAGNOSIS — Z3A.27 27 WEEKS GESTATION OF PREGNANCY: Primary | ICD-10-CM

## 2020-06-05 DIAGNOSIS — O44.02 PLACENTA PREVIA, SECOND TRIMESTER: ICD-10-CM

## 2020-06-05 LAB
ERYTHROCYTE [DISTWIDTH] IN BLOOD BY AUTOMATED COUNT: 12.7 % (ref 11.6–15.1)
GLUCOSE 1H P 50 G GLC PO SERPL-MCNC: 91 MG/DL
HCT VFR BLD AUTO: 33.7 % (ref 34.8–46.1)
HGB BLD-MCNC: 10.8 G/DL (ref 11.5–15.4)
MCH RBC QN AUTO: 27.8 PG (ref 26.8–34.3)
MCHC RBC AUTO-ENTMCNC: 32 G/DL (ref 31.4–37.4)
MCV RBC AUTO: 87 FL (ref 82–98)
PLATELET # BLD AUTO: 324 THOUSANDS/UL (ref 149–390)
PMV BLD AUTO: 9.4 FL (ref 8.9–12.7)
RBC # BLD AUTO: 3.88 MILLION/UL (ref 3.81–5.12)
SL AMB  POCT GLUCOSE, UA: NEGATIVE
SL AMB POCT URINE PROTEIN: NEGATIVE
WBC # BLD AUTO: 8.63 THOUSAND/UL (ref 4.31–10.16)

## 2020-06-05 PROCEDURE — 86592 SYPHILIS TEST NON-TREP QUAL: CPT | Performed by: OBSTETRICS & GYNECOLOGY

## 2020-06-05 PROCEDURE — PNV: Performed by: OBSTETRICS & GYNECOLOGY

## 2020-06-05 PROCEDURE — 36415 COLL VENOUS BLD VENIPUNCTURE: CPT | Performed by: OBSTETRICS & GYNECOLOGY

## 2020-06-05 PROCEDURE — 90715 TDAP VACCINE 7 YRS/> IM: CPT | Performed by: OBSTETRICS & GYNECOLOGY

## 2020-06-05 PROCEDURE — 85027 COMPLETE CBC AUTOMATED: CPT | Performed by: OBSTETRICS & GYNECOLOGY

## 2020-06-05 PROCEDURE — 81002 URINALYSIS NONAUTO W/O SCOPE: CPT | Performed by: OBSTETRICS & GYNECOLOGY

## 2020-06-05 PROCEDURE — 82950 GLUCOSE TEST: CPT | Performed by: OBSTETRICS & GYNECOLOGY

## 2020-06-05 PROCEDURE — 90471 IMMUNIZATION ADMIN: CPT | Performed by: OBSTETRICS & GYNECOLOGY

## 2020-06-08 LAB — RPR SER QL: NORMAL

## 2020-07-01 ENCOUNTER — ROUTINE PRENATAL (OUTPATIENT)
Dept: OBGYN CLINIC | Facility: CLINIC | Age: 25
End: 2020-07-01
Payer: COMMERCIAL

## 2020-07-01 VITALS — WEIGHT: 164 LBS | BODY MASS INDEX: 32.03 KG/M2 | SYSTOLIC BLOOD PRESSURE: 120 MMHG | DIASTOLIC BLOOD PRESSURE: 64 MMHG

## 2020-07-01 DIAGNOSIS — Z3A.30 30 WEEKS GESTATION OF PREGNANCY: Primary | ICD-10-CM

## 2020-07-01 DIAGNOSIS — O26.03 EXCESSIVE WEIGHT GAIN DURING PREGNANCY IN THIRD TRIMESTER: ICD-10-CM

## 2020-07-01 DIAGNOSIS — O99.013 ANEMIA AFFECTING PREGNANCY IN THIRD TRIMESTER: ICD-10-CM

## 2020-07-01 LAB
SL AMB  POCT GLUCOSE, UA: NORMAL
SL AMB POCT URINE PROTEIN: NORMAL

## 2020-07-01 PROCEDURE — 81002 URINALYSIS NONAUTO W/O SCOPE: CPT | Performed by: OBSTETRICS & GYNECOLOGY

## 2020-07-01 PROCEDURE — PNV: Performed by: OBSTETRICS & GYNECOLOGY

## 2020-07-01 RX ORDER — FERROUS SULFATE TAB EC 324 MG (65 MG FE EQUIVALENT) 324 (65 FE) MG
324 TABLET DELAYED RESPONSE ORAL
Qty: 90 TABLET | Refills: 1 | Status: SHIPPED | OUTPATIENT
Start: 2020-07-01 | End: 2020-07-15 | Stop reason: SDUPTHER

## 2020-07-01 NOTE — PROGRESS NOTES
IUP at 30 weeks and 6 days  Patient reports positive fetal movement, denies contractions leakage of fluid or bleeding  She is having some Reynolds Pedersen type contractions  She does admit to feeling easily fatigued falls asleep easily  She does have mild anemia identified on her 28 week labs her Glucola test was normal however she did gain 5 lb in visits  We did discuss reviewed diet and exercise  Patient will stay well hydrated she is practicing COVID safety measures wearing a face mask when in public frequent handwashing avoiding touching her face  Reviewed signs of  labor and kick counts follow-up in 2 weeks and p r n  patient has had a flu shot and she has had Tdap her recent visit

## 2020-07-08 ENCOUNTER — TELEPHONE (OUTPATIENT)
Dept: PERINATAL CARE | Facility: CLINIC | Age: 25
End: 2020-07-08

## 2020-07-08 NOTE — TELEPHONE ENCOUNTER
Spoke with patient and confirmed appointment with [facility]  1 support person ( must be over age of 15) may accompany you for your appointment  Are you and your support person able to wear a mask without a valve during entire appointment? YES  [facility] does not allow cell phone use, recording device or streaming during the ultrasound  Check in and rooming questions will be done via phone, when you arrive in the parking lot please call the following inside line # prior to entering office:    [facility] [phone]  [facility] line: [address] [facility] line:  [address] line: [phone]  [place] line:  [phone]  [place] line: [phone]    Do you or your support person currently have:  Fever or flu- like symptoms? NO  Symptoms of upper respiratory infection like runny nose, sore throat or cough? NO  Do you have new headache that you have not had in the past?NO  Have you experienced any new shortness of breath recently? NO  Do you have any new loss of taste or smell? NO  Do you have any new diarrhea, nausea or vomiting? NO  Have you recently been in contact with anyone who has been sick or diagnosed with COVID-19 infection? NO  Have you been recommended to quarantine because of an exposure to a confirmed positive COVID19 person? NO  You and your support person will be screened upon arrival     Patient verbalized understanding of all instructions   -------------------------------------------------------------

## 2020-07-09 ENCOUNTER — ULTRASOUND (OUTPATIENT)
Dept: PERINATAL CARE | Facility: OTHER | Age: 25
End: 2020-07-09
Payer: COMMERCIAL

## 2020-07-09 VITALS
HEART RATE: 73 BPM | DIASTOLIC BLOOD PRESSURE: 68 MMHG | BODY MASS INDEX: 32.28 KG/M2 | WEIGHT: 164.4 LBS | SYSTOLIC BLOOD PRESSURE: 105 MMHG | HEIGHT: 60 IN | TEMPERATURE: 98.7 F

## 2020-07-09 DIAGNOSIS — IMO0002 EVALUATE ANATOMY NOT SEEN ON PRIOR SONOGRAM: ICD-10-CM

## 2020-07-09 DIAGNOSIS — Z36.89 ENCOUNTER FOR ULTRASOUND TO CHECK FETAL GROWTH: ICD-10-CM

## 2020-07-09 DIAGNOSIS — Z3A.32 32 WEEKS GESTATION OF PREGNANCY: ICD-10-CM

## 2020-07-09 DIAGNOSIS — O44.03 PLACENTA PREVIA, THIRD TRIMESTER: Primary | ICD-10-CM

## 2020-07-09 PROBLEM — O44.40 LOW-LYING PLACENTA: Status: ACTIVE | Noted: 2020-04-16

## 2020-07-09 PROCEDURE — 76816 OB US FOLLOW-UP PER FETUS: CPT | Performed by: OBSTETRICS & GYNECOLOGY

## 2020-07-09 PROCEDURE — 76817 TRANSVAGINAL US OBSTETRIC: CPT | Performed by: OBSTETRICS & GYNECOLOGY

## 2020-07-09 PROCEDURE — 99212 OFFICE O/P EST SF 10 MIN: CPT | Performed by: OBSTETRICS & GYNECOLOGY

## 2020-07-09 NOTE — PATIENT INSTRUCTIONS
Thank you for choosing us for your  care today  If you have any questions about your ultrasound or care, please do not hesitate to contact us or your primary obstetrician  Some general instructions for your pregnancy are:     Exercise: Aim for 22 minutes per day (150 minutes per week!) of regular exercise  This is obviously hard to do during a pandemic but walking is great   Nutrition: aim for calcium-rich and iron-rich foods as well as healthy sources of protein  This will help you gain a healthy amount of weight   Protect against the flu: get yourself and your entire household vaccinated against influenza   Protect against coronavirus: practice social distancing, wear a mask in public, and wash your hands often  This virus can be spread easily by people without symptoms  Notify your primary care doctor if you have any symptoms including cough, shortness of breath or difficulty breathing, fever, chills, muscle pain, sore throat, or new loss of taste or smell   Learn about Preeclampsia: preeclampsia is a common, serious complication in pregnancy  A blood pressure of 140mmHg (top number or systolic) OR 32VWWS (bottom number or diastolic) is not normal and needs evaluation by your doctor   If you smoke, try to reduce how many cigarettes you smoke or quit completely  Do not vape   Other warning signs to watch out for in pregnancy or postpartum: chest pain, obstructed breathing or shortness of breath, seizures, thoughts of hurting yourself or your baby, bleeding, a painful or swollen leg, fever, or headache ([facility]IR[facility] Warning Signs campaign)  If these happen call 911  Itching is also not normal in pregnancy and if you experience this, especially over your hands and feet, potentially worse at night, notify your doctors

## 2020-07-09 NOTE — PROGRESS NOTES
[address]: Ms Germán Manuel was seen today at 32w0d for followup placental location ultrasound with fetal growth measurement  See ultrasound report under "OB Procedures" tab  Please don't hesitate to contact our office with any concerns or questions    Kaylene Alfonso MD

## 2020-07-15 ENCOUNTER — ROUTINE PRENATAL (OUTPATIENT)
Dept: OBGYN CLINIC | Facility: CLINIC | Age: 25
End: 2020-07-15
Payer: COMMERCIAL

## 2020-07-15 VITALS
WEIGHT: 162.8 LBS | DIASTOLIC BLOOD PRESSURE: 70 MMHG | HEIGHT: 60 IN | SYSTOLIC BLOOD PRESSURE: 120 MMHG | BODY MASS INDEX: 31.96 KG/M2

## 2020-07-15 DIAGNOSIS — O99.013 ANEMIA AFFECTING PREGNANCY IN THIRD TRIMESTER: ICD-10-CM

## 2020-07-15 DIAGNOSIS — O44.40 LOW-LYING PLACENTA: ICD-10-CM

## 2020-07-15 DIAGNOSIS — Z3A.30 30 WEEKS GESTATION OF PREGNANCY: ICD-10-CM

## 2020-07-15 DIAGNOSIS — Z3A.32 32 WEEKS GESTATION OF PREGNANCY: Primary | ICD-10-CM

## 2020-07-15 LAB
SL AMB  POCT GLUCOSE, UA: NEGATIVE
SL AMB POCT URINE PROTEIN: NEGATIVE

## 2020-07-15 PROCEDURE — 81002 URINALYSIS NONAUTO W/O SCOPE: CPT | Performed by: OBSTETRICS & GYNECOLOGY

## 2020-07-15 PROCEDURE — PNV: Performed by: OBSTETRICS & GYNECOLOGY

## 2020-07-15 RX ORDER — FERROUS SULFATE TAB EC 324 MG (65 MG FE EQUIVALENT) 324 (65 FE) MG
324 TABLET DELAYED RESPONSE ORAL
Qty: 90 TABLET | Refills: 1 | Status: SHIPPED | OUTPATIENT
Start: 2020-07-15 | End: 2020-10-13

## 2020-07-15 NOTE — PROGRESS NOTES
IUP at 32 weeks and 6 days  Patient reports positive fetal movement, denies contractions leakage of fluid or bleeding  Blood pressure is normal and stable  Her urine dip negative negative  Recent follow-up ultrasound at the  Center shows normal growth and all missed anatomy has been completed  She still has a low lying placenta without signs of Vasa previa  She does have a follow-up growth and placental location visit in 3 weeks  Reviewed signs of  labor and kick counts follow-up in 2 weeks and p r n

## 2020-07-20 ENCOUNTER — ROUTINE PRENATAL (OUTPATIENT)
Dept: OBGYN CLINIC | Facility: CLINIC | Age: 25
End: 2020-07-20
Payer: COMMERCIAL

## 2020-07-20 ENCOUNTER — TELEPHONE (OUTPATIENT)
Dept: OBGYN CLINIC | Facility: CLINIC | Age: 25
End: 2020-07-20

## 2020-07-20 VITALS
BODY MASS INDEX: 32.35 KG/M2 | WEIGHT: 164.8 LBS | DIASTOLIC BLOOD PRESSURE: 68 MMHG | HEIGHT: 60 IN | TEMPERATURE: 97.6 F | SYSTOLIC BLOOD PRESSURE: 126 MMHG

## 2020-07-20 DIAGNOSIS — Z3A.33 33 WEEKS GESTATION OF PREGNANCY: Primary | ICD-10-CM

## 2020-07-20 DIAGNOSIS — R45.89 DYSPHORIC MOOD: ICD-10-CM

## 2020-07-20 LAB
SL AMB  POCT GLUCOSE, UA: NORMAL
SL AMB POCT URINE PROTEIN: NORMAL

## 2020-07-20 PROCEDURE — 99213 OFFICE O/P EST LOW 20 MIN: CPT | Performed by: OBSTETRICS & GYNECOLOGY

## 2020-07-20 PROCEDURE — 81002 URINALYSIS NONAUTO W/O SCOPE: CPT | Performed by: OBSTETRICS & GYNECOLOGY

## 2020-07-20 NOTE — TELEPHONE ENCOUNTER
OB patient called c/o depression and anxiety, getting worse, uncontrolled crying  Gave appointment for today

## 2020-07-20 NOTE — PROGRESS NOTES
Patient was seen today for visit regarding anxiety/depression  1  33 4 weeks-good fetal movement noted  Fetal movement and  labor precautions were reviewed  To follow-up in the next 1-2 weeks time is scheduled for prenatal visit  2  Low-lying placenta-noted to be 11-14 mm from the os at most recent M ultrasound  Has follow-up in the near future to follow this  To call with any vaginal bleeding  3  Partner sister with achondroplasia-ultrasound has been normal   4  Previous history of mild dysplasia-Pap 2020 was normal   5  Depression/anxiety-noted over the past few weeks or so  She denies any suicidal or homicidal thoughts  She states she had significant postpartum depression with her 1st child but was never treated and did not seek treatment for it  She then was okay with her 2nd child  Now she is having worsening symptoms  She was counseled in detail about options  She has spoken with a counselor through her work and she will do this as well  She was also referred to [facility] Me, although they have backlog of a few weeks time  She also has a primary care doctor through the [facility] and she will contact them regarding possible more immediate counseling  She was counseled about calling 911 or seeking emergency help if any crisis situation  She has never been on medication, so would suggest avoid medication if possible unless it is strongly recommended by counselors

## 2020-07-20 NOTE — PATIENT INSTRUCTIONS
Pregnancy at 31 to [address]:   What changes are happening in my body? You may continue to have symptoms such as shortness of breath, heartburn, contractions, or swelling of your ankles and feet  You may be gaining about 1 pound a week now  How do I care for myself at this stage of my pregnancy? · Eat a variety of healthy foods  Healthy foods include fruits, vegetables, whole-grain breads, low-fat dairy foods, beans, lean meats, and fish  Drink liquids as directed  Ask how much liquid to drink each day and which liquids are best for you  Limit caffeine to less than 200 milligrams each day  Limit your intake of fish to 2 servings each week  Choose fish low in mercury such as canned light tuna, shrimp, salmon, cod, or tilapia  Do not  eat fish high in mercury such as swordfish, tilefish, baudilio mackerel, and shark  · Manage heartburn  by eating 4 or 5 small meals each day instead of large meals  Avoid spicy food  · Manage swelling  by lying down and putting your feet up  · Take prenatal vitamins as directed  Your need for certain vitamins and minerals, such as folic acid, increases during pregnancy  Prenatal vitamins provide some of the extra vitamins and minerals you need  Prenatal vitamins may also help to decrease the risk of certain birth defects  · Talk to your healthcare provider about exercise  Moderate exercise can help you stay fit  Your healthcare provider will help you plan an exercise program that is safe for you during pregnancy  · Do not smoke  If you smoke, it is never too late to quit  Smoking increases your risk of a miscarriage and other health problems during your pregnancy  Smoking can cause your baby to be born too early or weigh less at birth  Ask your healthcare provider for information if you need help quitting  · Do not drink alcohol  Alcohol passes from your body to your baby through the placenta   It can affect your baby's brain development and cause fetal alcohol syndrome (FAS)  FAS is a group of conditions that causes mental, behavior, and growth problems  · Talk to your healthcare provider before you take any medicines  Many medicines may harm your baby if you take them when you are pregnant  Do not take any medicines, vitamins, herbs, or supplements without first talking to your healthcare provider  Never use illegal or street drugs (such as marijuana or cocaine) while you are pregnant  What are some safety tips during pregnancy? · Avoid hot tubs and saunas  Do not use a hot tub or sauna while you are pregnant, especially during your first trimester  Hot tubs and saunas may raise your baby's temperature and increase the risk of birth defects  · Avoid toxoplasmosis  This is an infection caused by eating raw meat or being around infected cat feces  It can cause birth defects, miscarriages, and other problems  Wash your hands after you touch raw meat  Make sure any meat is well-cooked before you eat it  Avoid raw eggs and unpasteurized milk  Use gloves or ask someone else to clean your cat's litter box while you are pregnant  What changes are happening with my baby? By 34 weeks, your baby may weigh more than 5 pounds  Your baby will be about 12 ½ inches long from the top of the head to the rump (baby's bottom)  Your baby is gaining about ½ pound a week  Your baby's eyes open and close now  Your baby's kicks and movements are more forceful at this time  What do I need to know about prenatal care? Your healthcare provider will check your blood pressure and weight  You may also need the following:  · A urine test  may also be done to check for sugar and protein  These can be signs of gestational diabetes or infection  Protein in your urine may also be a sign of preeclampsia  Preeclampsia is a condition that can develop during week 20 or later of your pregnancy   It causes high blood pressure, and it can cause problems with your kidneys and other organs  · A Tdap vaccine  may be recommended by your healthcare provider  · Fundal height  is a measurement of your uterus to check your baby's growth  This number is usually the same as the number of weeks that you have been pregnant  Your healthcare provider may also check your baby's position  · Your baby's heart rate  will be checked  When should I seek immediate care? · You develop a severe headache that does not go away  · You have new or increased vision changes, such as blurred or spotted vision  · You have new or increased swelling in your face or hands  · You have vaginal spotting or bleeding  · Your water broke or you feel warm water gushing or trickling from your vagina  When should I contact my healthcare provider? · You have more than 5 contractions in 1 hour  · You notice any changes in your baby's movements  · You have abdominal cramps, pressure, or tightening  · You have a change in vaginal discharge  · You have chills or a fever  · You have vaginal itching, burning, or pain  · You have yellow, green, white, or foul-smelling vaginal discharge  · You have pain or burning when you urinate, less urine than usual, or pink or bloody urine  · You have questions or concerns about your condition or care  CARE AGREEMENT:   You have the right to help plan your care  Learn about your health condition and how it may be treated  Discuss treatment options with your caregivers to decide what care you want to receive  You always have the right to refuse treatment  The above information is an  only  It is not intended as medical advice for individual conditions or treatments  Talk to your doctor, nurse or pharmacist before following any medical regimen to see if it is safe and effective for you    © 2017 [employer] Information is for End User's use only and may not be sold, redistributed or otherwise used for commercial purposes  All illustrations and images included in CareNotes® are the copyrighted property of A D A M , Inc  or [employer]

## 2020-07-23 ENCOUNTER — HOSPITAL ENCOUNTER (OUTPATIENT)
Facility: HOSPITAL | Age: 25
Discharge: HOME | End: 2020-07-23
Attending: OBSTETRICS & GYNECOLOGY | Admitting: OBSTETRICS & GYNECOLOGY
Payer: COMMERCIAL

## 2020-07-23 ENCOUNTER — TELEPHONE (OUTPATIENT)
Dept: OTHER | Facility: OTHER | Age: 25
End: 2020-07-23

## 2020-07-23 VITALS
TEMPERATURE: 98.6 F | HEIGHT: 60 IN | DIASTOLIC BLOOD PRESSURE: 69 MMHG | RESPIRATION RATE: 16 BRPM | HEART RATE: 88 BPM | WEIGHT: 164 LBS | SYSTOLIC BLOOD PRESSURE: 120 MMHG | BODY MASS INDEX: 32.2 KG/M2

## 2020-07-23 PROBLEM — O46.90 VAGINAL BLEEDING DURING PREGNANCY, ANTEPARTUM: Status: ACTIVE | Noted: 2020-07-23

## 2020-07-23 LAB
BACTERIA UR QL AUTO: ABNORMAL /HPF
BILIRUB UR QL STRIP: NEGATIVE
CLARITY UR: ABNORMAL
COLOR UR: YELLOW
GLUCOSE UR STRIP-MCNC: NEGATIVE MG/DL
HGB UR QL STRIP.AUTO: ABNORMAL
KETONES UR STRIP-MCNC: NEGATIVE MG/DL
LEUKOCYTE ESTERASE UR QL STRIP: ABNORMAL
NITRITE UR QL STRIP: NEGATIVE
NON-SQ EPI CELLS URNS QL MICRO: ABNORMAL /HPF
PH UR STRIP.AUTO: 7 [PH]
PROT UR STRIP-MCNC: NEGATIVE MG/DL
RBC #/AREA URNS AUTO: ABNORMAL /HPF
SP GR UR STRIP.AUTO: 1.01 (ref 1–1.03)
UROBILINOGEN UR QL STRIP.AUTO: 1 E.U./DL
WBC #/AREA URNS AUTO: ABNORMAL /HPF

## 2020-07-23 PROCEDURE — 81001 URINALYSIS AUTO W/SCOPE: CPT | Performed by: OBSTETRICS & GYNECOLOGY

## 2020-07-23 PROCEDURE — G0463 HOSPITAL OUTPT CLINIC VISIT: HCPCS

## 2020-07-23 PROCEDURE — 99214 OFFICE O/P EST MOD 30 MIN: CPT | Performed by: OBSTETRICS & GYNECOLOGY

## 2020-07-23 PROCEDURE — 99202 OFFICE O/P NEW SF 15 MIN: CPT

## 2020-07-23 RX ORDER — MORPHINE SULFATE 0.5 MG/ML
INJECTION, SOLUTION EPIDURAL; INTRATHECAL; INTRAVENOUS
Status: DISCONTINUED
Start: 2020-07-23 | End: 2020-07-23 | Stop reason: WASHOUT

## 2020-07-23 RX ORDER — PHENYLEPHRINE HCL IN 0.9% NACL 1 MG/10 ML
SYRINGE (ML) INTRAVENOUS
Status: DISCONTINUED
Start: 2020-07-23 | End: 2020-07-23 | Stop reason: HOSPADM

## 2020-07-23 RX ORDER — FENTANYL CITRATE 50 UG/ML
INJECTION, SOLUTION INTRAMUSCULAR; INTRAVENOUS
Status: DISCONTINUED
Start: 2020-07-23 | End: 2020-07-23 | Stop reason: WASHOUT

## 2020-07-23 NOTE — TELEPHONE ENCOUNTER
[phone] Pt: Ozella Severin Msg: Pt is 34 weeks pregnant and she just woke up to use the bathroom and she thought she urinated but it was all blood  Now she's cramping badly in her back and her stomach  She also doesn't feel like the baby is very active as normal but she did feel the baby move today     On Call Provider Paged

## 2020-07-23 NOTE — PROGRESS NOTES
L&D Triage Note - OB/GYN  Murl Paci 21 y o  female MRN: [id number]  Unit/Bed#: L&D 329-01 Encounter: [id number]    This is a patient of [address]    JILLIAN: Estimated Date of Delivery: 9/3/20    HPI:  21 y o  [id number] 34w0d with pregnancy complicated by low lying placenta presents with complaint of vaginal bleeding  Patient states the bleeding occurred this AM when she went to use the bathroom  She saw bright red blood in the toilet and when she wiped  She denies any bleeding since then  She c/o lower pelvic and back cramping that started this AM  The cramping does not occur at regular intervals  She has not had recent intercourse or any abdominal trauma  Her alst     Contractions: yes, irregularly   Leakage of fluid: no  Vaginal Bleeding: yes  Fetal movement: present    Her current obstetrical history is significant for low lying placenta, asthma, LGSIL with normal pap     Her past obstetrical history is significant for post partum depression      ROS:  Constitutional: Negative  Respiratory: Negative  Cardiovascular: Negative   Gastrointestinal: Negative    OBJECTIVE:  /69   Pulse 88   Temp 98 6 °F (37 °C) (Oral)   Resp 16   Ht 5' (1 524 m)   Wt 74 4 kg (164 lb)   LMP 11/03/2019   BMI 32 03 kg/m²   Body mass index is 32 03 kg/m²  Physical Exam   Constitutional: She appears well-developed  Cardiovascular: Normal rate and regular rhythm  Pulmonary/Chest: Breath sounds normal    Abdominal: Soft  Neurological: She is alert  Psychiatric: She has a normal mood and affect  Speculum exam: Cervix visualized, no active bleeding from the os  Old blood noticed in the vault         SVE:  Dilation: Closed  Effacement (%): 0  Station: Ballotable  Method: Manual  OB Examiner: [place]     FHT:  Baseline Rate: 135 bpm  Variability: Moderate 6-25 bpm  Accelerations: 15 x 15 or greater    TOCO:   Contraction Frequency (minutes): baseline irritability   Contraction Duration (seconds): 40-60  Contraction Quality: Mild    IMAGING:       TVUS   Presentation: vertex   Placenta: posterior         TAUS   Placenta: posterior   Presentation: vertex    Labs:   Recent Results (from the past 24 hour(s))   UA w Reflex to Microscopic w Reflex to Culture    Collection Time: 07/23/20  8:41 AM   Result Value Ref Range    Color, UA Yellow     Clarity, UA Slightly Cloudy     Specific [place], UA 1 015 1 003 - 1 030    pH, UA 7 0 4 5, 5 0, 5 5, 6 0, 6 5, 7 0, 7 5, 8 0    Leukocytes, UA Small (A) Negative    Nitrite, UA Negative Negative    Protein, UA Negative Negative mg/dl    Glucose, UA Negative Negative mg/dl    Ketones, UA Negative Negative mg/dl    Urobilinogen, UA 1 0 0 2, 1 0 E U /dl E U /dl    Bilirubin, UA Negative Negative    Blood, UA Large (A) Negative       Patient is seen by Corinne Valencia and Salinas     ASSESSMENT/PLAN  Ben Montes is a 21 y o  [id number] at 34w0d with pregnancy c/b low lying placenta  She had one episode of bleeding this morning that resolved on its own  It was associated with cramping/pelvic pressure that was occurring at irregular intervals  At her last MFM U/S her placenta was 14 5mm away from the internal os without concern for vasa previa  She has not had LoF and endorses good fetal movement     -speculum exam    - TVUS    - SVE    - UA    - toco     A speculum exam did not demonstrate any active bleeding from the os  There was old blood noted in the vault  A gentle digital exam was closed/thick/high  A TV/US did not show and placenta near the internal os  A TAUS showed posterior placenta and vertex presentation  Baby was reactive on the monitor and there was baseline irritability on toco  Her UA showed blood and small leukocytes, but is not concerning  Discussed bleeding with the patient  If it is to worsen that she should call her office  Discussed hydration for contractions with the patient and PTL precautions           Discharge instructions  · Patient instructed to call if experiencing worsening contractions, vaginal bleeding, loss of fluid or decreased fetal movement  · Will follow up with OBGYN as scheduled       D/w Dr Grisel Smith MD  OB/GYN PGY-1  7/23/2020  9:04 AM

## 2020-07-27 ENCOUNTER — TELEPHONE (OUTPATIENT)
Dept: OBGYN CLINIC | Facility: CLINIC | Age: 25
End: 2020-07-27

## 2020-07-27 NOTE — TELEPHONE ENCOUNTER
Received paperwork for patient for disability beginning on 8/7/2021, however there are not supporting notes within the chart  Patient was notified to call and discuss in case we are missing something

## 2020-07-29 ENCOUNTER — TELEPHONE (OUTPATIENT)
Dept: PERINATAL CARE | Facility: CLINIC | Age: 25
End: 2020-07-29

## 2020-07-29 NOTE — TELEPHONE ENCOUNTER
Spoke with patient and confirmed appointment with MFM  1 support person ( must be over age of 15) may accompany patient  Will you and your support person be able to wear a mask ,without a valve , during entire appointment? yes   To minimize your exposure in our waiting area,check in and rooming questions will be done via phone  When you arrive in the parking lot please call the following inside line # prior to entering office:      Suad Leyva line:  [phone]      Have you or your support person traveled outside the state in the last 2 weeks?no   If yes, what state did you travel to[place]    Do you or your support person have:  Fever or flu- like symptoms?no  Symptoms of upper respiratory infection like runny nose, sore throat or cough? no  Do you have new headache that you have not had in the past?no  Have you experienced any new shortness of breath recently?no  Do you have any new loss of taste or smell?no  Do you have any new diarrhea, nausea or vomiting?no  Have you recently been in contact with anyone who has been sick or diagnosed with COVID-19 infection?no  Have you been recommended to quarantine because of an exposure to a confirmed positive COVID19 person?no  You and your support person will have temperature screening upon arrival   Patient verbalized understanding of all instructions

## 2020-07-30 ENCOUNTER — ROUTINE PRENATAL (OUTPATIENT)
Dept: OBGYN CLINIC | Facility: CLINIC | Age: 25
End: 2020-07-30
Payer: COMMERCIAL

## 2020-07-30 ENCOUNTER — ULTRASOUND (OUTPATIENT)
Dept: PERINATAL CARE | Facility: OTHER | Age: 25
End: 2020-07-30
Payer: COMMERCIAL

## 2020-07-30 VITALS
DIASTOLIC BLOOD PRESSURE: 70 MMHG | HEIGHT: 60 IN | SYSTOLIC BLOOD PRESSURE: 108 MMHG | BODY MASS INDEX: 32.98 KG/M2 | TEMPERATURE: 98 F | WEIGHT: 168 LBS

## 2020-07-30 VITALS
TEMPERATURE: 96 F | HEIGHT: 60 IN | SYSTOLIC BLOOD PRESSURE: 96 MMHG | BODY MASS INDEX: 32.98 KG/M2 | DIASTOLIC BLOOD PRESSURE: 67 MMHG | WEIGHT: 168 LBS | HEART RATE: 82 BPM

## 2020-07-30 DIAGNOSIS — O44.40 LOW-LYING PLACENTA: ICD-10-CM

## 2020-07-30 DIAGNOSIS — Z36.89 ENCOUNTER FOR ULTRASOUND TO ASSESS FETAL GROWTH: Primary | ICD-10-CM

## 2020-07-30 DIAGNOSIS — Z3A.35 35 WEEKS GESTATION OF PREGNANCY: Primary | ICD-10-CM

## 2020-07-30 DIAGNOSIS — O99.213 OBESITY AFFECTING PREGNANCY IN THIRD TRIMESTER: ICD-10-CM

## 2020-07-30 DIAGNOSIS — Z3A.33 33 WEEKS GESTATION OF PREGNANCY: ICD-10-CM

## 2020-07-30 PROBLEM — O99.210 OBESITY AFFECTING PREGNANCY: Status: ACTIVE | Noted: 2020-07-30

## 2020-07-30 LAB
SL AMB  POCT GLUCOSE, UA: NORMAL
SL AMB POCT URINE PROTEIN: NORMAL

## 2020-07-30 PROCEDURE — 87086 URINE CULTURE/COLONY COUNT: CPT | Performed by: OBSTETRICS & GYNECOLOGY

## 2020-07-30 PROCEDURE — 76817 TRANSVAGINAL US OBSTETRIC: CPT | Performed by: OBSTETRICS & GYNECOLOGY

## 2020-07-30 PROCEDURE — 81002 URINALYSIS NONAUTO W/O SCOPE: CPT | Performed by: OBSTETRICS & GYNECOLOGY

## 2020-07-30 PROCEDURE — PNV: Performed by: OBSTETRICS & GYNECOLOGY

## 2020-07-30 PROCEDURE — 76816 OB US FOLLOW-UP PER FETUS: CPT | Performed by: OBSTETRICS & GYNECOLOGY

## 2020-07-30 PROCEDURE — 99212 OFFICE O/P EST SF 10 MIN: CPT | Performed by: OBSTETRICS & GYNECOLOGY

## 2020-07-30 NOTE — PROGRESS NOTES
[address]: Ms Jose Jerome was seen today at 35w0d for followup placental location ultrasound with fetal growth measurement  See ultrasound report under "OB Procedures" tab  Low lying placenta resolved      Please don't hesitate to contact our office with any concerns or questions   -Brandi Bowen MD

## 2020-07-30 NOTE — PATIENT INSTRUCTIONS
Pregnancy at 28 to [address]:   What changes are happening in my body? You are considered full term at the beginning of 37 weeks  Your breathing may be easier if your baby has moved down into a head-down position  You may need to urinate more often because the baby may be pressing on your bladder  You may also feel more discomfort and get tired easily  How do I care for myself at this stage of my pregnancy? · Eat a variety of healthy foods  Healthy foods include fruits, vegetables, whole-grain breads, low-fat dairy foods, beans, lean meats, and fish  Drink liquids as directed  Ask how much liquid to drink each day and which liquids are best for you  Limit caffeine to less than 200 milligrams each day  Limit your intake of fish to 2 servings each week  Choose fish low in mercury such as canned light tuna, shrimp, salmon, cod, or tilapia  Do not  eat fish high in mercury such as swordfish, tilefish, baudilio mackerel, and shark  · Take prenatal vitamins as directed  Your need for certain vitamins and minerals, such as folic acid, increases during pregnancy  Prenatal vitamins provide some of the extra vitamins and minerals you need  Prenatal vitamins may also help to decrease the risk of certain birth defects  · Rest as needed  Put your feet up if you have swelling in your ankles and feet  · Do not smoke  If you smoke, it is never too late to quit  Smoking increases your risk of a miscarriage and other health problems during your pregnancy  Smoking can cause your baby to be born too early or weigh less at birth  Ask your healthcare provider for information if you need help quitting  · Do not drink alcohol  Alcohol passes from your body to your baby through the placenta  It can affect your baby's brain development and cause fetal alcohol syndrome (FAS)  FAS is a group of conditions that causes mental, behavior, and growth problems       · Talk to your healthcare provider before you take any medicines  Many medicines may harm your baby if you take them when you are pregnant  Do not take any medicines, vitamins, herbs, or supplements without first talking to your healthcare provider  Never use illegal or street drugs (such as marijuana or cocaine) while you are pregnant  · Talk to your healthcare provider before you travel  You may not be able to travel in an airplane after 36 weeks  He may also recommend that you avoid long road trips  What are some safety tips during pregnancy? · Avoid hot tubs and saunas  Do not use a hot tub or sauna while you are pregnant, especially during your first trimester  Hot tubs and saunas may raise your baby's temperature and increase the risk of birth defects  · Avoid toxoplasmosis  This is an infection caused by eating raw meat or being around infected cat feces  It can cause birth defects, miscarriages, and other problems  Wash your hands after you touch raw meat  Make sure any meat is well-cooked before you eat it  Avoid raw eggs and unpasteurized milk  Use gloves or ask someone else to clean your cat's litter box while you are pregnant  · Ask your healthcare provider about travel  The most comfortable time to travel is during the second trimester  Ask your healthcare provider if you can travel after 36 weeks  You may not be able to travel in an airplane after 36 weeks  He may also recommend that you avoid long road trips  What changes are happening with my baby? By 38 weeks, your baby may weigh between 6 and 9 pounds  Your baby may be about 14 inches long from the top of the head to the rump (baby's bottom)  Your baby hears well enough to know your voice  As your baby gets larger, you may feel fewer kicks and more stretching and rolling  Your baby may move into a head-down position  Your baby will also rest lower in your abdomen  What do I need to know about prenatal care?   Your healthcare provider will check your blood pressure and weight  You may also need the following:  · A urine test  may also be done to check for sugar and protein  These can be signs of gestational diabetes or infection  Protein in your urine may also be a sign of preeclampsia  Preeclampsia is a condition that can develop during week 20 or later of your pregnancy  It causes high blood pressure, and it can cause problems with your kidneys and other organs  · A blood test  may be done to check for anemia (low iron level)  · A Tdap vaccine  may be recommended by your healthcare provider  · A group B strep test  is a test that is done to check for group B strep infection  Group B strep is a type of bacteria that may be found in the vagina or rectum  It can be passed to your baby during delivery if you have it  Your healthcare provider will take swab your vagina or rectum and send the sample to the lab for tests  · Fundal height  is a measurement of your uterus to check your baby's growth  This number is usually the same as the number of weeks that you have been pregnant  Your healthcare provider may also check your baby's position  · Your baby's heart rate  will be checked  When should I seek immediate care? · You develop a severe headache that does not go away  · You have new or increased vision changes, such as blurred or spotted vision  · You have new or increased swelling in your face or hands  · You have vaginal spotting or bleeding  · Your water broke or you feel warm water gushing or trickling from your vagina  When should I contact my healthcare provider? · You have more than 5 contractions in 1 hour  · You notice any changes in your baby's movements  · You have abdominal cramps, pressure, or tightening  · You have a change in vaginal discharge  · You have chills or a fever  · You have vaginal itching, burning, or pain  · You have yellow, green, white, or foul-smelling vaginal discharge      · You have pain or burning when you urinate, less urine than usual, or pink or bloody urine  · You have questions or concerns about your condition or care  CARE AGREEMENT:   You have the right to help plan your care  Learn about your health condition and how it may be treated  Discuss treatment options with your caregivers to decide what care you want to receive  You always have the right to refuse treatment  The above information is an  only  It is not intended as medical advice for individual conditions or treatments  Talk to your doctor, nurse or pharmacist before following any medical regimen to see if it is safe and effective for you  © 2017 [address] [employer] Information is for End User's use only and may not be sold, redistributed or otherwise used for commercial purposes  All illustrations and images included in CareNotes® are the copyrighted property of A D A M , Inc  or [employer]

## 2020-07-30 NOTE — LETTER
July 30, 2020     Clementina Miranda MD  [address] [facility]    Patient: Tamara Donahue   YOB: 1995   Date of Visit: 7/30/2020       Dear Dr Pita Hill: Thank you for referring Jean Claude Harrell to me for evaluation  Below are my notes for this consultation  If you have questions, please do not hesitate to call me  I look forward to following your patient along with you  Sincerely,        Brando Barry MD        CC: No Recipients  Brando Barry MD  7/30/2020  2:46 PM  Sign at close encounter  [address]: Ms Bridget Chowdhury was seen today at 35w0d for followup placental location ultrasound with fetal growth measurement  See ultrasound report under "OB Procedures" tab  Low lying placenta resolved      Please don't hesitate to contact our office with any concerns or questions   -Brando Barry MD

## 2020-07-30 NOTE — PROGRESS NOTES
Patient was seen today for follow-up visit  1  35 0 weeks-good fetal movement noted  Fetal movement and  labor precautions were reviewed  To follow-up 1 week time for prenatal with GBS  2  Low-lying placenta-patient was seen on 20 at labor floor with episode of bleeding  This was followed by brown discharge for 2 days and no further bleeding since that time  She does have low-lying placenta, last MFM ultrasound placenta was 11-14 mm from the os  Should she have any further bleeding, she should call immediately  She has MFM ultrasound later today  She inquired about going out of work and we could consider that, pending MFM ultrasound today  3  Partner sister with achondroplasia-ultrasounds have been normal   4  Previous history of mild dysplasia- Pap 2020 was normal   5  Depression/anxiety-doing much better today  Again, denies suicidal or homicidal thoughts  She did reach out to her job, but the counselors that they recommended are through the job insurance which she does not have  She will try to see if any of them are available  She also reset to [facility], she states that they are backed up for the next few weeks  Offered baby & Me program, but they have also had delay the past   She is doing fine at this time but was encouraged to try to get in touch with a counselor  Particularly, postpartum period can be associated with increased risk for depression

## 2020-07-31 ENCOUNTER — TELEPHONE (OUTPATIENT)
Dept: OBGYN CLINIC | Facility: CLINIC | Age: 25
End: 2020-07-31

## 2020-07-31 LAB — BACTERIA UR CULT: NORMAL

## 2020-07-31 NOTE — TELEPHONE ENCOUNTER
Request from employer for out of work with office noted was faxed, scanned and original mailed to patient  Unfortunately, the out of work request was pending the results of patient's ultrasound and ultrasound was normal so request was denied

## 2020-08-05 ENCOUNTER — ROUTINE PRENATAL (OUTPATIENT)
Dept: OBGYN CLINIC | Facility: CLINIC | Age: 25
End: 2020-08-05
Payer: COMMERCIAL

## 2020-08-05 VITALS
HEART RATE: 75 BPM | WEIGHT: 167 LBS | BODY MASS INDEX: 32.61 KG/M2 | DIASTOLIC BLOOD PRESSURE: 72 MMHG | TEMPERATURE: 97.1 F | SYSTOLIC BLOOD PRESSURE: 118 MMHG

## 2020-08-05 DIAGNOSIS — O44.53: ICD-10-CM

## 2020-08-05 DIAGNOSIS — O99.213 OBESITY AFFECTING PREGNANCY IN THIRD TRIMESTER: ICD-10-CM

## 2020-08-05 DIAGNOSIS — O36.8130 DECREASED FETAL MOVEMENT AFFECTING MANAGEMENT OF PREGNANCY IN THIRD TRIMESTER, SINGLE OR UNSPECIFIED FETUS: ICD-10-CM

## 2020-08-05 DIAGNOSIS — Z3A.35 35 WEEKS GESTATION OF PREGNANCY: Primary | ICD-10-CM

## 2020-08-05 LAB
SL AMB  POCT GLUCOSE, UA: NORMAL
SL AMB POCT URINE PROTEIN: NORMAL

## 2020-08-05 PROCEDURE — PNV: Performed by: OBSTETRICS & GYNECOLOGY

## 2020-08-05 PROCEDURE — 81002 URINALYSIS NONAUTO W/O SCOPE: CPT | Performed by: OBSTETRICS & GYNECOLOGY

## 2020-08-05 NOTE — PROGRESS NOTES
IUP at 35 weeks and 6 days  Patient reports diminished fetal movement over the past several days  States she has tried eating and drinking something sweet with little improvement in those symptoms  Fetal nonstress test was done today was reactive baseline heart rate in the 120s with positive accelerations  ANGELIC was also checked and was normal   Reviewed signs of  labor and kick counts in detail  Patient instructed to pay close attention to the fetal movements and do or kick counts every day    GBS was collected today

## 2020-08-05 NOTE — LETTER
August 5, 2020     Patient: Lisandra Martin   YOB: 1995   Date of Visit: 8/5/2020       To Whom it May Concern:    Alisha Dubon is under my professional care  She was seen in my office on 8/5/2020  Effective immediately, Ben Listen is to work no more than 4 hours daily due to pregnancy complications       If you have any questions or concerns, please don't hesitate to call  Sincerely,          VERONICA Rubio DO        CC: No Recipients

## 2020-08-08 LAB — GP B STREP DNA SPEC QL NAA+PROBE: NEGATIVE

## 2020-08-11 ENCOUNTER — TELEPHONE (OUTPATIENT)
Dept: OBGYN CLINIC | Facility: CLINIC | Age: 25
End: 2020-08-11

## 2020-08-11 ENCOUNTER — HOSPITAL ENCOUNTER (OUTPATIENT)
Facility: HOSPITAL | Age: 25
Discharge: HOME | End: 2020-08-11
Attending: OBSTETRICS & GYNECOLOGY | Admitting: OBSTETRICS & GYNECOLOGY
Payer: COMMERCIAL

## 2020-08-11 VITALS
HEIGHT: 60 IN | BODY MASS INDEX: 33.38 KG/M2 | SYSTOLIC BLOOD PRESSURE: 115 MMHG | WEIGHT: 170 LBS | TEMPERATURE: 99.8 F | RESPIRATION RATE: 16 BRPM | HEART RATE: 93 BPM | DIASTOLIC BLOOD PRESSURE: 75 MMHG

## 2020-08-11 PROBLEM — Z3A.36 36 WEEKS GESTATION OF PREGNANCY: Status: ACTIVE | Noted: 2020-04-16

## 2020-08-11 PROCEDURE — G0463 HOSPITAL OUTPT CLINIC VISIT: HCPCS

## 2020-08-11 PROCEDURE — 99212 OFFICE O/P EST SF 10 MIN: CPT

## 2020-08-11 NOTE — PROGRESS NOTES
L&D Triage Note - OB/GYN  Elif Walter 21 y o  female MRN: [id number]  Unit/Bed#: L&D 329-01 Encounter: [id number]      Ranjit Thomason is a 21 y o  [id number] at 36w5d who presented with decreased fetal movements  PLAN  - NST : Reactive with FHR showed a baseline of 150bpm, moderate variability, no decelerations,+15 by 15 reactive accelerations  No contractions observed on tocometer   - SVE: 1/50/-3  - Modified BPP: ANGELIC was 12    Patient appears comfortable and is stable for discharge at this time  Discharge instructions  · Patient instructed to call if experiencing worsening contractions, vaginal bleeding, loss of fluid or decreased fetal movement  · Will follow up with OBGYN at next appointment 8/12/2020  D/w Dr May James  ______________    SUBJECTIVE    JILLIAN: Estimated Date of Delivery: 9/3/20    HPI:  21 y o  [id number] at 36w5d with JILLIAN of 9/3/2020 by 1st trimester US presents with complaint of decreased fetal movements  She noticed that baby was moving less than usual about a week ago  She was due for her routine prenatal visit and was reassured during the visit with the doppler fetal heart sounds heard during the visit and was told by following physician to present here if this continues to be a concern  Since then, she continues to feel that baby is moving less and is here today for that reason  Patient states that she usually feels her baby move a lot after breakfast especially and after consuming fruit juices  For the last week, she has not felt usual baby kicks after breakfast or after taking any juices  However, she feels baby move when she sleeps at night and turns from side to side on the bed  She keeps track of her kick counts and states that fetal kicks are less in intensity and less than 10 in 2hrs of focused counting at rest   This morning around 10am, she also noticed some spotting after wiping after a bathroom visit  This has not recurred since then   Patient denies any leakage or gush of fluid from her vagina and there are no contractions  She denies fever, chills, CP, SOB, headaches, dizziness or any changes in vision  No nausea, vomiting, abdominal pain  Contractions: none  Leakage of fluid: none  Vaginal Bleeding: one episode of spotting   Fetal movement: present but less than usual     This pregnancy is significant for obesity,  Low-lying placenta resolved per most recent  ultrasound 2020  Patient Active Problem List   Diagnosis    Low grade squamous intraepithelial lesion (LGSIL) on cervical Pap smear    Asthma during pregnancy    Low-lying placenta    36 weeks gestation of pregnancy    Dysphoric mood    Vaginal bleeding during pregnancy, antepartum    Obesity affecting pregnancy         ROS:  Constitutional: Negative  Respiratory: Negative  Cardiovascular: Negative    Gastrointestinal: Negative    OBJECTIVE:  /75   Pulse 93   Temp 99 8 °F (37 7 °C) (Oral)   Resp 16   Ht 5' (1 524 m)   Wt 77 1 kg (170 lb)   LMP 2019   BMI 33 20 kg/m²   Body mass index is 33 2 kg/m²  Physical Exam  Vitals signs reviewed  Constitutional:       General: She is not in acute distress  HENT:      Head: Normocephalic  Eyes:      Conjunctiva/sclera: Conjunctivae normal    Cardiovascular:      Rate and Rhythm: Regular rhythm  Heart sounds: Normal heart sounds  Pulmonary:      Effort: No respiratory distress  Breath sounds: Normal breath sounds  Chest:      Chest wall: No tenderness  Abdominal:      General: Bowel sounds are normal       Palpations: Abdomen is soft  Tenderness: There is no abdominal tenderness  Neurological:      Mental Status: She is alert     Psychiatric:         Mood and Affect: Mood normal          SVE: 50/-3       FHT:  Baseline Rate: 145 bpm  Variability: Moderate 6-25 bpm  Accelerations: 15 x 15 or greater  Decelerations: None    TOCO:   Contraction Frequency (minutes): irritability Contraction Duration (seconds): N/a  Contraction Quality: Mild    IMAGING:            TAUS   ANGELIC      - Q1 5 55cm     - Q2 1 93cm     - Q3 2 48cm     - Q4 1 93cm     - Total: 11 89cm   Placenta: fundal   Presentation: vertex    Labs: No results found for this or any previous visit (from the past 24 hour(s))        Brennen Pratt MD  Family Medicine resident  8/11/2020  12:30 PM

## 2020-08-11 NOTE — DISCHARGE INSTRUCTIONS
Pregnancy at 28 to [address]:   You are considered full term at the beginning of 37 weeks  Your breathing may be easier if your baby has moved down into a head-down position  You may need to urinate more often because the baby may be pressing on your bladder  You may also feel more discomfort and get tired easily  DISCHARGE INSTRUCTIONS:   Seek care immediately if:   · You develop a severe headache that does not go away  · You have new or increased vision changes, such as blurred or spotted vision  · You have new or increased swelling in your face or hands  · You have vaginal spotting or bleeding  · Your water broke or you feel warm water gushing or trickling from your vagina  Contact your healthcare provider if:   · You have more than 5 contractions in 1 hour  · You notice any changes in your baby's movements  · You have abdominal cramps, pressure, or tightening  · You have a change in vaginal discharge  · You have chills or a fever  · You have vaginal itching, burning, or pain  · You have yellow, green, white, or foul-smelling vaginal discharge  · You have pain or burning when you urinate, less urine than usual, or pink or bloody urine  · You have questions or concerns about your condition or care  How to care for yourself at this stage of your pregnancy:   · Eat a variety of healthy foods  Healthy foods include fruits, vegetables, whole-grain breads, low-fat dairy foods, beans, lean meats, and fish  Drink liquids as directed  Ask how much liquid to drink each day and which liquids are best for you  Limit caffeine to less than 200 milligrams each day  Limit your intake of fish to 2 servings each week  Choose fish low in mercury such as canned light tuna, shrimp, salmon, cod, or tilapia  Do not  eat fish high in mercury such as swordfish, tilefish, baudilio mackerel, and shark  · Take prenatal vitamins as directed    Your need for certain vitamins and minerals, such as folic acid, increases during pregnancy  Prenatal vitamins provide some of the extra vitamins and minerals you need  Prenatal vitamins may also help to decrease the risk of certain birth defects  · Rest as needed  Put your feet up if you have swelling in your ankles and feet  · Do not smoke  If you smoke, it is never too late to quit  Smoking increases your risk of a miscarriage and other health problems during your pregnancy  Smoking can cause your baby to be born too early or weigh less at birth  Ask your healthcare provider for information if you need help quitting  · Do not drink alcohol  Alcohol passes from your body to your baby through the placenta  It can affect your baby's brain development and cause fetal alcohol syndrome (FAS)  FAS is a group of conditions that causes mental, behavior, and growth problems  · Talk to your healthcare provider before you take any medicines  Many medicines may harm your baby if you take them when you are pregnant  Do not take any medicines, vitamins, herbs, or supplements without first talking to your healthcare provider  Never use illegal or street drugs (such as marijuana or cocaine) while you are pregnant  · Talk to your healthcare provider before you travel  You may not be able to travel in an airplane after 36 weeks  He may also recommend that you avoid long road trips  Safety tips:   · Avoid hot tubs and saunas  Do not use a hot tub or sauna while you are pregnant, especially during your first trimester  Hot tubs and saunas may raise your baby's temperature and increase the risk of birth defects  · Avoid toxoplasmosis  This is an infection caused by eating raw meat or being around infected cat feces  It can cause birth defects, miscarriages, and other problems  Wash your hands after you touch raw meat  Make sure any meat is well-cooked before you eat it  Avoid raw eggs and unpasteurized milk   Use gloves or ask someone else to clean your cat's litter box while you are pregnant  · Ask your healthcare provider about travel  The most comfortable time to travel is during the second trimester  Ask your healthcare provider if you can travel after 36 weeks  You may not be able to travel in an airplane after 36 weeks  He may also recommend that you avoid long road trips  Changes that are happening with your baby:  By 38 weeks, your baby may weigh between 6 and 9 pounds  Your baby may be about 14 inches long from the top of the head to the rump (baby's bottom)  Your baby hears well enough to know your voice  As your baby gets larger, you may feel fewer kicks and more stretching and rolling  Your baby may move into a head-down position  Your baby will also rest lower in your abdomen  What you need to know about prenatal care: Your healthcare provider will check your blood pressure and weight  You may also need the following:  · A urine test  may also be done to check for sugar and protein  These can be signs of gestational diabetes or infection  Protein in your urine may also be a sign of preeclampsia  Preeclampsia is a condition that can develop during week 20 or later of your pregnancy  It causes high blood pressure, and it can cause problems with your kidneys and other organs  · A blood test  may be done to check for anemia (low iron level)  · A Tdap vaccine  may be recommended by your healthcare provider  · A group B strep test  is a test that is done to check for group B strep infection  Group B strep is a type of bacteria that may be found in the vagina or rectum  It can be passed to your baby during delivery if you have it  Your healthcare provider will take swab your vagina or rectum and send the sample to the lab for tests  · Fundal height  is a measurement of your uterus to check your baby's growth  This number is usually the same as the number of weeks that you have been pregnant   Your healthcare provider may also check your baby's position  · Your baby's heart rate  will be checked  © 2017 [employer] Information is for End User's use only and may not be sold, redistributed or otherwise used for commercial purposes  All illustrations and images included in CareNotes® are the copyrighted property of [employer]  or [employer]  The above information is an  only  It is not intended as medical advice for individual conditions or treatments  Talk to your doctor, nurse or pharmacist before following any medical regimen to see if it is safe and effective for you

## 2020-08-12 ENCOUNTER — ROUTINE PRENATAL (OUTPATIENT)
Dept: OBGYN CLINIC | Facility: CLINIC | Age: 25
End: 2020-08-12
Payer: COMMERCIAL

## 2020-08-12 VITALS
DIASTOLIC BLOOD PRESSURE: 78 MMHG | WEIGHT: 169 LBS | BODY MASS INDEX: 33.18 KG/M2 | HEIGHT: 60 IN | TEMPERATURE: 96.7 F | SYSTOLIC BLOOD PRESSURE: 128 MMHG

## 2020-08-12 DIAGNOSIS — Z3A.37 37 WEEKS GESTATION OF PREGNANCY: Primary | ICD-10-CM

## 2020-08-12 DIAGNOSIS — Z3A.36 36 WEEKS GESTATION OF PREGNANCY: ICD-10-CM

## 2020-08-12 PROBLEM — O44.40 LOW-LYING PLACENTA: Status: RESOLVED | Noted: 2020-04-16 | Resolved: 2020-08-12

## 2020-08-12 LAB
SL AMB  POCT GLUCOSE, UA: NORMAL
SL AMB POCT URINE PROTEIN: NORMAL

## 2020-08-12 PROCEDURE — PNV: Performed by: OBSTETRICS & GYNECOLOGY

## 2020-08-12 PROCEDURE — 81002 URINALYSIS NONAUTO W/O SCOPE: CPT | Performed by: OBSTETRICS & GYNECOLOGY

## 2020-08-12 NOTE — PATIENT INSTRUCTIONS
Pregnancy at 28 to [address]:   What changes are happening in my body? You are considered full term at the beginning of 37 weeks  Your breathing may be easier if your baby has moved down into a head-down position  You may need to urinate more often because the baby may be pressing on your bladder  You may also feel more discomfort and get tired easily  How do I care for myself at this stage of my pregnancy? · Eat a variety of healthy foods  Healthy foods include fruits, vegetables, whole-grain breads, low-fat dairy foods, beans, lean meats, and fish  Drink liquids as directed  Ask how much liquid to drink each day and which liquids are best for you  Limit caffeine to less than 200 milligrams each day  Limit your intake of fish to 2 servings each week  Choose fish low in mercury such as canned light tuna, shrimp, salmon, cod, or tilapia  Do not  eat fish high in mercury such as swordfish, tilefish, baudilio mackerel, and shark  · Take prenatal vitamins as directed  Your need for certain vitamins and minerals, such as folic acid, increases during pregnancy  Prenatal vitamins provide some of the extra vitamins and minerals you need  Prenatal vitamins may also help to decrease the risk of certain birth defects  · Rest as needed  Put your feet up if you have swelling in your ankles and feet  · Do not smoke  If you smoke, it is never too late to quit  Smoking increases your risk of a miscarriage and other health problems during your pregnancy  Smoking can cause your baby to be born too early or weigh less at birth  Ask your healthcare provider for information if you need help quitting  · Do not drink alcohol  Alcohol passes from your body to your baby through the placenta  It can affect your baby's brain development and cause fetal alcohol syndrome (FAS)  FAS is a group of conditions that causes mental, behavior, and growth problems       · Talk to your healthcare provider before you take any medicines  Many medicines may harm your baby if you take them when you are pregnant  Do not take any medicines, vitamins, herbs, or supplements without first talking to your healthcare provider  Never use illegal or street drugs (such as marijuana or cocaine) while you are pregnant  · Talk to your healthcare provider before you travel  You may not be able to travel in an airplane after 36 weeks  He may also recommend that you avoid long road trips  What are some safety tips during pregnancy? · Avoid hot tubs and saunas  Do not use a hot tub or sauna while you are pregnant, especially during your first trimester  Hot tubs and saunas may raise your baby's temperature and increase the risk of birth defects  · Avoid toxoplasmosis  This is an infection caused by eating raw meat or being around infected cat feces  It can cause birth defects, miscarriages, and other problems  Wash your hands after you touch raw meat  Make sure any meat is well-cooked before you eat it  Avoid raw eggs and unpasteurized milk  Use gloves or ask someone else to clean your cat's litter box while you are pregnant  · Ask your healthcare provider about travel  The most comfortable time to travel is during the second trimester  Ask your healthcare provider if you can travel after 36 weeks  You may not be able to travel in an airplane after 36 weeks  He may also recommend that you avoid long road trips  What changes are happening with my baby? By 38 weeks, your baby may weigh between 6 and 9 pounds  Your baby may be about 14 inches long from the top of the head to the rump (baby's bottom)  Your baby hears well enough to know your voice  As your baby gets larger, you may feel fewer kicks and more stretching and rolling  Your baby may move into a head-down position  Your baby will also rest lower in your abdomen  What do I need to know about prenatal care?   Your healthcare provider will check your blood pressure and weight  You may also need the following:  · A urine test  may also be done to check for sugar and protein  These can be signs of gestational diabetes or infection  Protein in your urine may also be a sign of preeclampsia  Preeclampsia is a condition that can develop during week 20 or later of your pregnancy  It causes high blood pressure, and it can cause problems with your kidneys and other organs  · A blood test  may be done to check for anemia (low iron level)  · A Tdap vaccine  may be recommended by your healthcare provider  · A group B strep test  is a test that is done to check for group B strep infection  Group B strep is a type of bacteria that may be found in the vagina or rectum  It can be passed to your baby during delivery if you have it  Your healthcare provider will take swab your vagina or rectum and send the sample to the lab for tests  · Fundal height  is a measurement of your uterus to check your baby's growth  This number is usually the same as the number of weeks that you have been pregnant  Your healthcare provider may also check your baby's position  · Your baby's heart rate  will be checked  When should I seek immediate care? · You develop a severe headache that does not go away  · You have new or increased vision changes, such as blurred or spotted vision  · You have new or increased swelling in your face or hands  · You have vaginal spotting or bleeding  · Your water broke or you feel warm water gushing or trickling from your vagina  When should I contact my healthcare provider? · You have more than 5 contractions in 1 hour  · You notice any changes in your baby's movements  · You have abdominal cramps, pressure, or tightening  · You have a change in vaginal discharge  · You have chills or a fever  · You have vaginal itching, burning, or pain  · You have yellow, green, white, or foul-smelling vaginal discharge      · You have pain or burning when you urinate, less urine than usual, or pink or bloody urine  · You have questions or concerns about your condition or care  CARE AGREEMENT:   You have the right to help plan your care  Learn about your health condition and how it may be treated  Discuss treatment options with your caregivers to decide what care you want to receive  You always have the right to refuse treatment  The above information is an  only  It is not intended as medical advice for individual conditions or treatments  Talk to your doctor, nurse or pharmacist before following any medical regimen to see if it is safe and effective for you  © 2017 [employer] Information is for End User's use only and may not be sold, redistributed or otherwise used for commercial purposes  All illustrations and images included in CareNotes® are the copyrighted property of [employer]  or [employer]

## 2020-08-12 NOTE — PROGRESS NOTES
Patient was seen today for prenatal visit  1  36 6 weeks-patient now notes fetal movement  Was seen yesterday on L&D with decreased fetal movement  At evaluation yesterday, reactive NST was noted and ANGELIC was normal   Fetal movement and labor precautions were reviewed in detail  Patient thinks the baby is just stuffed in there  I did offer her to do NST/ANGELIC with upcoming weekly prenatal visits  When this was offered, patient states that the baby is moving okay and she declines at this time  Strongly encouraged to call with decreased fetal movement  2  Spotting-noted after exam today  She was also examined yesterday, with likely cervical irritation  3  Previous low-lying placenta-resolved at most recent ultrasound with MFM  3  Partner sister with baby with achondroplasia  Ultrasounds have been normal   5  Previous history of mild dysplasia-Pap January 2020 was normal   6  Depression/anxiety-denies any concerns    Recommend follow-up with counselor as reviewed at an earlier visit

## 2020-08-18 ENCOUNTER — HOSPITAL ENCOUNTER (OUTPATIENT)
Facility: HOSPITAL | Age: 25
Discharge: HOME | End: 2020-08-18
Attending: OBSTETRICS & GYNECOLOGY | Admitting: OBSTETRICS & GYNECOLOGY
Payer: COMMERCIAL

## 2020-08-18 ENCOUNTER — TELEPHONE (OUTPATIENT)
Dept: OBGYN CLINIC | Facility: CLINIC | Age: 25
End: 2020-08-18

## 2020-08-18 VITALS
TEMPERATURE: 98.6 F | SYSTOLIC BLOOD PRESSURE: 113 MMHG | HEART RATE: 81 BPM | DIASTOLIC BLOOD PRESSURE: 70 MMHG | RESPIRATION RATE: 17 BRPM

## 2020-08-18 PROBLEM — Z3A.37 37 WEEKS GESTATION OF PREGNANCY: Status: ACTIVE | Noted: 2020-04-16

## 2020-08-18 PROCEDURE — G0463 HOSPITAL OUTPT CLINIC VISIT: HCPCS

## 2020-08-18 PROCEDURE — 99212 OFFICE O/P EST SF 10 MIN: CPT

## 2020-08-18 NOTE — PROGRESS NOTES
Patient discharged to home by doctor  Discharge instructions reviewed with patient by Dr Ysabel Chopra  Patient verbalizes understanding & denies further questions at this time

## 2020-08-18 NOTE — TELEPHONE ENCOUNTER
Patient called, 38 weeks OB  Giii Pii,   C/o felt dizzy this morning and fell against the bathroom wall, hit right side of belly, no ROM  No FM yet today  Patient had breakfast   Sent to L&D

## 2020-08-18 NOTE — DISCHARGE INSTRUCTIONS
Pregnancy at [address] to [address]:   You are considered full term at the beginning of 37 weeks  Your breathing may be easier if your baby has moved down into a head-down position  You may need to urinate more often because the baby may be pressing on your bladder  You may also feel more discomfort and get tired easily  DISCHARGE INSTRUCTIONS:   Seek care immediately if:   · You develop a severe headache that does not go away  · You have new or increased vision changes, such as blurred or spotted vision  · You have new or increased swelling in your face or hands  · You have vaginal spotting or bleeding  · Your water broke or you feel warm water gushing or trickling from your vagina  Contact your healthcare provider if:   · You have more than 5 contractions in 1 hour  · You notice any changes in your baby's movements  · You have abdominal cramps, pressure, or tightening  · You have a change in vaginal discharge  · You have chills or a fever  · You have vaginal itching, burning, or pain  · You have yellow, green, white, or foul-smelling vaginal discharge  · You have pain or burning when you urinate, less urine than usual, or pink or bloody urine  · You have questions or concerns about your condition or care  How to care for yourself at this stage of your pregnancy:   · Eat a variety of healthy foods  Healthy foods include fruits, vegetables, whole-grain breads, low-fat dairy foods, beans, lean meats, and fish  Drink liquids as directed  Ask how much liquid to drink each day and which liquids are best for you  Limit caffeine to less than 200 milligrams each day  Limit your intake of fish to 2 servings each week  Choose fish low in mercury such as canned light tuna, shrimp, salmon, cod, or tilapia  Do not  eat fish high in mercury such as swordfish, tilefish, baudilio mackerel, and shark  · Take prenatal vitamins as directed    Your need for certain vitamins and minerals, such as folic acid, increases during pregnancy  Prenatal vitamins provide some of the extra vitamins and minerals you need  Prenatal vitamins may also help to decrease the risk of certain birth defects  · Rest as needed  Put your feet up if you have swelling in your ankles and feet  · Do not smoke  If you smoke, it is never too late to quit  Smoking increases your risk of a miscarriage and other health problems during your pregnancy  Smoking can cause your baby to be born too early or weigh less at birth  Ask your healthcare provider for information if you need help quitting  · Do not drink alcohol  Alcohol passes from your body to your baby through the placenta  It can affect your baby's brain development and cause fetal alcohol syndrome (FAS)  FAS is a group of conditions that causes mental, behavior, and growth problems  · Talk to your healthcare provider before you take any medicines  Many medicines may harm your baby if you take them when you are pregnant  Do not take any medicines, vitamins, herbs, or supplements without first talking to your healthcare provider  Never use illegal or street drugs (such as marijuana or cocaine) while you are pregnant  · Talk to your healthcare provider before you travel  You may not be able to travel in an airplane after 36 weeks  He may also recommend that you avoid long road trips  Safety tips:   · Avoid hot tubs and saunas  Do not use a hot tub or sauna while you are pregnant, especially during your first trimester  Hot tubs and saunas may raise your baby's temperature and increase the risk of birth defects  · Avoid toxoplasmosis  This is an infection caused by eating raw meat or being around infected cat feces  It can cause birth defects, miscarriages, and other problems  Wash your hands after you touch raw meat  Make sure any meat is well-cooked before you eat it  Avoid raw eggs and unpasteurized milk   Use gloves or ask someone else to clean your cat's litter box while you are pregnant  · Ask your healthcare provider about travel  The most comfortable time to travel is during the second trimester  Ask your healthcare provider if you can travel after 36 weeks  You may not be able to travel in an airplane after 36 weeks  He may also recommend that you avoid long road trips  Changes that are happening with your baby:  By 38 weeks, your baby may weigh between 6 and 9 pounds  Your baby may be about 14 inches long from the top of the head to the rump (baby's bottom)  Your baby hears well enough to know your voice  As your baby gets larger, you may feel fewer kicks and more stretching and rolling  Your baby may move into a head-down position  Your baby will also rest lower in your abdomen  What you need to know about prenatal care: Your healthcare provider will check your blood pressure and weight  You may also need the following:  · A urine test  may also be done to check for sugar and protein  These can be signs of gestational diabetes or infection  Protein in your urine may also be a sign of preeclampsia  Preeclampsia is a condition that can develop during week 20 or later of your pregnancy  It causes high blood pressure, and it can cause problems with your kidneys and other organs  · A blood test  may be done to check for anemia (low iron level)  · A Tdap vaccine  may be recommended by your healthcare provider  · A group B strep test  is a test that is done to check for group B strep infection  Group B strep is a type of bacteria that may be found in the vagina or rectum  It can be passed to your baby during delivery if you have it  Your healthcare provider will take swab your vagina or rectum and send the sample to the lab for tests  · Fundal height  is a measurement of your uterus to check your baby's growth  This number is usually the same as the number of weeks that you have been pregnant   Your healthcare provider may also check your baby's position  · Your baby's heart rate  will be checked  © 2017 [address] Information is for End User's use only and may not be sold, redistributed or otherwise used for commercial purposes  All illustrations and images included in CareNotes® are the copyrighted property of A D A M [employer] Inc  or [employer]  The above information is an  only  It is not intended as medical advice for individual conditions or treatments  Talk to your doctor, nurse or pharmacist before following any medical regimen to see if it is safe and effective for you

## 2020-08-20 ENCOUNTER — ULTRASOUND (OUTPATIENT)
Dept: OBGYN CLINIC | Facility: CLINIC | Age: 25
End: 2020-08-20
Payer: COMMERCIAL

## 2020-08-20 ENCOUNTER — ROUTINE PRENATAL (OUTPATIENT)
Dept: OBGYN CLINIC | Facility: CLINIC | Age: 25
End: 2020-08-20
Payer: COMMERCIAL

## 2020-08-20 VITALS
BODY MASS INDEX: 33.18 KG/M2 | WEIGHT: 169 LBS | HEIGHT: 60 IN | SYSTOLIC BLOOD PRESSURE: 120 MMHG | TEMPERATURE: 97.1 F | DIASTOLIC BLOOD PRESSURE: 70 MMHG

## 2020-08-20 DIAGNOSIS — O9A.213 TRAUMATIC INJURY DURING PREGNANCY IN THIRD TRIMESTER: ICD-10-CM

## 2020-08-20 DIAGNOSIS — Z3A.38 38 WEEKS GESTATION OF PREGNANCY: Primary | ICD-10-CM

## 2020-08-20 LAB
SL AMB  POCT GLUCOSE, UA: NORMAL
SL AMB POCT URINE PROTEIN: NORMAL

## 2020-08-20 PROCEDURE — 81002 URINALYSIS NONAUTO W/O SCOPE: CPT | Performed by: OBSTETRICS & GYNECOLOGY

## 2020-08-20 PROCEDURE — PNV: Performed by: OBSTETRICS & GYNECOLOGY

## 2020-08-20 PROCEDURE — 59025 FETAL NON-STRESS TEST: CPT | Performed by: OBSTETRICS & GYNECOLOGY

## 2020-08-20 NOTE — PATIENT INSTRUCTIONS
Pregnancy at 28 to [address]:   What changes are happening in my body? You are considered full term at the beginning of 37 weeks  Your breathing may be easier if your baby has moved down into a head-down position  You may need to urinate more often because the baby may be pressing on your bladder  You may also feel more discomfort and get tired easily  How do I care for myself at this stage of my pregnancy? · Eat a variety of healthy foods  Healthy foods include fruits, vegetables, whole-grain breads, low-fat dairy foods, beans, lean meats, and fish  Drink liquids as directed  Ask how much liquid to drink each day and which liquids are best for you  Limit caffeine to less than 200 milligrams each day  Limit your intake of fish to 2 servings each week  Choose fish low in mercury such as canned light tuna, shrimp, salmon, cod, or tilapia  Do not  eat fish high in mercury such as swordfish, tilefish, baudilio mackerel, and shark  · Take prenatal vitamins as directed  Your need for certain vitamins and minerals, such as folic acid, increases during pregnancy  Prenatal vitamins provide some of the extra vitamins and minerals you need  Prenatal vitamins may also help to decrease the risk of certain birth defects  · Rest as needed  Put your feet up if you have swelling in your ankles and feet  · Do not smoke  If you smoke, it is never too late to quit  Smoking increases your risk of a miscarriage and other health problems during your pregnancy  Smoking can cause your baby to be born too early or weigh less at birth  Ask your healthcare provider for information if you need help quitting  · Do not drink alcohol  Alcohol passes from your body to your baby through the placenta  It can affect your baby's brain development and cause fetal alcohol syndrome (FAS)  FAS is a group of conditions that causes mental, behavior, and growth problems       · Talk to your healthcare provider before you take any medicines  Many medicines may harm your baby if you take them when you are pregnant  Do not take any medicines, vitamins, herbs, or supplements without first talking to your healthcare provider  Never use illegal or street drugs (such as marijuana or cocaine) while you are pregnant  · Talk to your healthcare provider before you travel  You may not be able to travel in an airplane after 36 weeks  He may also recommend that you avoid long road trips  What are some safety tips during pregnancy? · Avoid hot tubs and saunas  Do not use a hot tub or sauna while you are pregnant, especially during your first trimester  Hot tubs and saunas may raise your baby's temperature and increase the risk of birth defects  · Avoid toxoplasmosis  This is an infection caused by eating raw meat or being around infected cat feces  It can cause birth defects, miscarriages, and other problems  Wash your hands after you touch raw meat  Make sure any meat is well-cooked before you eat it  Avoid raw eggs and unpasteurized milk  Use gloves or ask someone else to clean your cat's litter box while you are pregnant  · Ask your healthcare provider about travel  The most comfortable time to travel is during the second trimester  Ask your healthcare provider if you can travel after 36 weeks  You may not be able to travel in an airplane after 36 weeks  He may also recommend that you avoid long road trips  What changes are happening with my baby? By 38 weeks, your baby may weigh between 6 and 9 pounds  Your baby may be about 14 inches long from the top of the head to the rump (baby's bottom)  Your baby hears well enough to know your voice  As your baby gets larger, you may feel fewer kicks and more stretching and rolling  Your baby may move into a head-down position  Your baby will also rest lower in your abdomen  What do I need to know about prenatal care?   Your healthcare provider will check your blood pressure and weight  You may also need the following:  · A urine test  may also be done to check for sugar and protein  These can be signs of gestational diabetes or infection  Protein in your urine may also be a sign of preeclampsia  Preeclampsia is a condition that can develop during week 20 or later of your pregnancy  It causes high blood pressure, and it can cause problems with your kidneys and other organs  · A blood test  may be done to check for anemia (low iron level)  · A Tdap vaccine  may be recommended by your healthcare provider  · A group B strep test  is a test that is done to check for group B strep infection  Group B strep is a type of bacteria that may be found in the vagina or rectum  It can be passed to your baby during delivery if you have it  Your healthcare provider will take swab your vagina or rectum and send the sample to the lab for tests  · Fundal height  is a measurement of your uterus to check your baby's growth  This number is usually the same as the number of weeks that you have been pregnant  Your healthcare provider may also check your baby's position  · Your baby's heart rate  will be checked  When should I seek immediate care? · You develop a severe headache that does not go away  · You have new or increased vision changes, such as blurred or spotted vision  · You have new or increased swelling in your face or hands  · You have vaginal spotting or bleeding  · Your water broke or you feel warm water gushing or trickling from your vagina  When should I contact my healthcare provider? · You have more than 5 contractions in 1 hour  · You notice any changes in your baby's movements  · You have abdominal cramps, pressure, or tightening  · You have a change in vaginal discharge  · You have chills or a fever  · You have vaginal itching, burning, or pain  · You have yellow, green, white, or foul-smelling vaginal discharge      · You have pain or burning when you urinate, less urine than usual, or pink or bloody urine  · You have questions or concerns about your condition or care  CARE AGREEMENT:   You have the right to help plan your care  Learn about your health condition and how it may be treated  Discuss treatment options with your caregivers to decide what care you want to receive  You always have the right to refuse treatment  The above information is an  only  It is not intended as medical advice for individual conditions or treatments  Talk to your doctor, nurse or pharmacist before following any medical regimen to see if it is safe and effective for you  © 2017 [address] Information is for End User's use only and may not be sold, redistributed or otherwise used for commercial purposes  All illustrations and images included in CareNotes® are the copyrighted property of A D A M [employer] Inc  or [employer]

## 2020-08-20 NOTE — PROGRESS NOTES
Patient was seen today for prenatal visit  1  38 0 weeks- good fetal movement was noted  Fetal movement and labor precautions were reviewed  Patient was seen on 8/18/20 status post fall  She states she fell onto her right side  She denies any thea abdominal trauma  Was monitored with reassuring testing by her report  She did note some spotting yesterday  Exam today was without any bleeding noted  ANGELIC was normal from 8/18/20  NST was done today, reactive, category 1 without decelerations or contractions noted  Baseline fetal heart tones 140  Patient is interested in induction, was scheduled for 8/27/20 at 0630 hours  2  Spotting-likely related to recent evaluation at L&D  To call with any further bleeding  3  Previous low-lying placenta-resolved  4  Partner sister baby with achondroplasia-ultrasounds have been normal   5  Prior history of mild dysplasia-Pap January 2020 was normal   6  Depression/anxiety-denies any current issues  To follow-up with her counselor as recommended by them

## 2020-08-21 ENCOUNTER — TELEPHONE (OUTPATIENT)
Dept: OBGYN CLINIC | Facility: CLINIC | Age: 25
End: 2020-08-21

## 2020-08-21 NOTE — TELEPHONE ENCOUNTER
Accommodation request form has been completed, faxed, scanned into chart, and original mailed to patient

## 2020-08-26 ENCOUNTER — HOSPITAL ENCOUNTER (OUTPATIENT)
Facility: HOSPITAL | Age: 25
Discharge: HOME | End: 2020-08-26
Attending: OBSTETRICS & GYNECOLOGY | Admitting: OBSTETRICS & GYNECOLOGY
Payer: COMMERCIAL

## 2020-08-26 ENCOUNTER — ANESTHESIA (INPATIENT)
Dept: ANESTHESIOLOGY | Facility: HOSPITAL | Age: 25
End: 2020-08-26
Payer: COMMERCIAL

## 2020-08-26 ENCOUNTER — TELEPHONE (OUTPATIENT)
Dept: OTHER | Facility: OTHER | Age: 25
End: 2020-08-26

## 2020-08-26 ENCOUNTER — ANESTHESIA EVENT (INPATIENT)
Dept: ANESTHESIOLOGY | Facility: HOSPITAL | Age: 25
End: 2020-08-26
Payer: COMMERCIAL

## 2020-08-26 ENCOUNTER — TELEPHONE (OUTPATIENT)
Dept: OBGYN CLINIC | Facility: CLINIC | Age: 25
End: 2020-08-26

## 2020-08-26 ENCOUNTER — HOSPITAL ENCOUNTER (INPATIENT)
Facility: HOSPITAL | Age: 25
LOS: 2 days | Discharge: HOME | End: 2020-08-28
Attending: OBSTETRICS & GYNECOLOGY | Admitting: OBSTETRICS & GYNECOLOGY
Payer: COMMERCIAL

## 2020-08-26 VITALS
WEIGHT: 169 LBS | BODY MASS INDEX: 33.18 KG/M2 | HEART RATE: 86 BPM | HEIGHT: 60 IN | TEMPERATURE: 99.4 F | SYSTOLIC BLOOD PRESSURE: 123 MMHG | DIASTOLIC BLOOD PRESSURE: 77 MMHG | RESPIRATION RATE: 18 BRPM

## 2020-08-26 VITALS
HEIGHT: 60 IN | WEIGHT: 170 LBS | BODY MASS INDEX: 33.38 KG/M2 | SYSTOLIC BLOOD PRESSURE: 129 MMHG | RESPIRATION RATE: 18 BRPM | HEART RATE: 101 BPM | DIASTOLIC BLOOD PRESSURE: 66 MMHG | TEMPERATURE: 98.7 F

## 2020-08-26 DIAGNOSIS — Z3A.38 38 WEEKS GESTATION OF PREGNANCY: Primary | ICD-10-CM

## 2020-08-26 LAB
ABO GROUP BLD: NORMAL
BASE EXCESS BLDCOA CALC-SCNC: -2.6 MMOL/L (ref 3–11)
BASE EXCESS BLDCOV CALC-SCNC: -3.5 MMOL/L (ref 1–9)
BLD GP AB SCN SERPL QL: NEGATIVE
ERYTHROCYTE [DISTWIDTH] IN BLOOD BY AUTOMATED COUNT: 13.7 % (ref 11.6–15.1)
HCO3 BLDCOA-SCNC: 24.2 MMOL/L (ref 17.3–27.3)
HCO3 BLDCOV-SCNC: 25.3 MMOL/L (ref 12.2–28.6)
HCT VFR BLD AUTO: 32.7 % (ref 34.8–46.1)
HGB BLD-MCNC: 10.3 G/DL (ref 11.5–15.4)
MCH RBC QN AUTO: 25.4 PG (ref 26.8–34.3)
MCHC RBC AUTO-ENTMCNC: 31.5 G/DL (ref 31.4–37.4)
MCV RBC AUTO: 81 FL (ref 82–98)
O2 CT VFR BLDCOA CALC: 4.8 ML/DL
OXYHGB MFR BLDCOA: 22.3 %
OXYHGB MFR BLDCOV: 7.5 %
PCO2 BLDCOA: 49.7 MM[HG] (ref 30–60)
PCO2 BLDCOV: 62.2 MM HG (ref 27–43)
PH BLDCOA: 7.31 [PH] (ref 7.23–7.43)
PH BLDCOV: 7.23 [PH] (ref 7.19–7.49)
PLATELET # BLD AUTO: 343 THOUSANDS/UL (ref 149–390)
PMV BLD AUTO: 9.1 FL (ref 8.9–12.7)
PO2 BLDCOA: 14.6 MM HG (ref 5–25)
RBC # BLD AUTO: 4.05 MILLION/UL (ref 3.81–5.12)
RH BLD: POSITIVE
SPECIMEN EXPIRATION DATE: NORMAL
WBC # BLD AUTO: 13.48 THOUSAND/UL (ref 4.31–10.16)

## 2020-08-26 PROCEDURE — 82805 BLOOD GASES W/O2 SATURATION: CPT | Performed by: OBSTETRICS & GYNECOLOGY

## 2020-08-26 PROCEDURE — 99211 OFF/OP EST MAY X REQ PHY/QHP: CPT

## 2020-08-26 PROCEDURE — G0463 HOSPITAL OUTPT CLINIC VISIT: HCPCS

## 2020-08-26 PROCEDURE — 86900 BLOOD TYPING SEROLOGIC ABO: CPT | Performed by: OBSTETRICS & GYNECOLOGY

## 2020-08-26 PROCEDURE — NC001 PR NO CHARGE: Performed by: OBSTETRICS & GYNECOLOGY

## 2020-08-26 PROCEDURE — 99212 OFFICE O/P EST SF 10 MIN: CPT

## 2020-08-26 PROCEDURE — 86901 BLOOD TYPING SEROLOGIC RH(D): CPT | Performed by: OBSTETRICS & GYNECOLOGY

## 2020-08-26 PROCEDURE — 86592 SYPHILIS TEST NON-TREP QUAL: CPT | Performed by: OBSTETRICS & GYNECOLOGY

## 2020-08-26 PROCEDURE — 85027 COMPLETE CBC AUTOMATED: CPT | Performed by: OBSTETRICS & GYNECOLOGY

## 2020-08-26 PROCEDURE — 86850 RBC ANTIBODY SCREEN: CPT | Performed by: OBSTETRICS & GYNECOLOGY

## 2020-08-26 PROCEDURE — 59400 OBSTETRICAL CARE: CPT | Performed by: OBSTETRICS & GYNECOLOGY

## 2020-08-26 PROCEDURE — 99203 OFFICE O/P NEW LOW 30 MIN: CPT

## 2020-08-26 PROCEDURE — 4A1HXCZ MONITORING OF PRODUCTS OF CONCEPTION, CARDIAC RATE, EXTERNAL APPROACH: ICD-10-PCS | Performed by: OBSTETRICS & GYNECOLOGY

## 2020-08-26 PROCEDURE — 99024 POSTOP FOLLOW-UP VISIT: CPT | Performed by: OBSTETRICS & GYNECOLOGY

## 2020-08-26 RX ORDER — SODIUM CHLORIDE, SODIUM LACTATE, POTASSIUM CHLORIDE, CALCIUM CHLORIDE 600; 310; 30; 20 MG/100ML; MG/100ML; MG/100ML; MG/100ML
125 INJECTION, SOLUTION INTRAVENOUS CONTINUOUS
Status: DISCONTINUED | OUTPATIENT
Start: 2020-08-26 | End: 2020-08-26

## 2020-08-26 RX ORDER — ONDANSETRON 2 MG/ML
4 INJECTION INTRAMUSCULAR; INTRAVENOUS EVERY 6 HOURS PRN
Status: DISCONTINUED | OUTPATIENT
Start: 2020-08-26 | End: 2020-08-26

## 2020-08-26 RX ORDER — ACETAMINOPHEN 325 MG/1
650 TABLET ORAL EVERY 4 HOURS PRN
Status: DISCONTINUED | OUTPATIENT
Start: 2020-08-26 | End: 2020-08-28 | Stop reason: HOSPADM

## 2020-08-26 RX ORDER — OXYTOCIN/RINGER'S LACTATE 30/500 ML
PLASTIC BAG, INJECTION (ML) INTRAVENOUS
Status: COMPLETED
Start: 2020-08-26 | End: 2020-08-26

## 2020-08-26 RX ORDER — DIAPER,BRIEF,INFANT-TODD,DISP
1 EACH MISCELLANEOUS 4 TIMES DAILY PRN
Status: DISCONTINUED | OUTPATIENT
Start: 2020-08-26 | End: 2020-08-28 | Stop reason: HOSPADM

## 2020-08-26 RX ORDER — DIPHENHYDRAMINE HCL 25 MG
25 TABLET ORAL EVERY 6 HOURS PRN
Status: DISCONTINUED | OUTPATIENT
Start: 2020-08-26 | End: 2020-08-28 | Stop reason: HOSPADM

## 2020-08-26 RX ORDER — ROPIVACAINE HYDROCHLORIDE 2 MG/ML
INJECTION, SOLUTION EPIDURAL; INFILTRATION; PERINEURAL CONTINUOUS PRN
Status: DISCONTINUED | OUTPATIENT
Start: 2020-08-26 | End: 2020-08-27 | Stop reason: HOSPADM

## 2020-08-26 RX ORDER — CALCIUM CARBONATE 200(500)MG
1000 TABLET,CHEWABLE ORAL DAILY PRN
Status: DISCONTINUED | OUTPATIENT
Start: 2020-08-26 | End: 2020-08-28 | Stop reason: HOSPADM

## 2020-08-26 RX ORDER — DOCUSATE SODIUM 100 MG/1
100 CAPSULE, LIQUID FILLED ORAL 2 TIMES DAILY
Status: DISCONTINUED | OUTPATIENT
Start: 2020-08-27 | End: 2020-08-28 | Stop reason: HOSPADM

## 2020-08-26 RX ORDER — ONDANSETRON 2 MG/ML
4 INJECTION INTRAMUSCULAR; INTRAVENOUS EVERY 8 HOURS PRN
Status: DISCONTINUED | OUTPATIENT
Start: 2020-08-26 | End: 2020-08-28 | Stop reason: HOSPADM

## 2020-08-26 RX ORDER — ALBUTEROL SULFATE 90 UG/1
2 AEROSOL, METERED RESPIRATORY (INHALATION) EVERY 6 HOURS PRN
Status: DISCONTINUED | OUTPATIENT
Start: 2020-08-26 | End: 2020-08-28 | Stop reason: HOSPADM

## 2020-08-26 RX ORDER — IBUPROFEN 600 MG/1
600 TABLET ORAL EVERY 6 HOURS PRN
Status: DISCONTINUED | OUTPATIENT
Start: 2020-08-26 | End: 2020-08-28 | Stop reason: HOSPADM

## 2020-08-26 RX ORDER — LIDOCAINE HYDROCHLORIDE AND EPINEPHRINE 15; 5 MG/ML; UG/ML
INJECTION, SOLUTION EPIDURAL
Status: COMPLETED | OUTPATIENT
Start: 2020-08-26 | End: 2020-08-26

## 2020-08-26 RX ADMIN — SODIUM CHLORIDE, SODIUM LACTATE, POTASSIUM CHLORIDE, AND CALCIUM CHLORIDE 999 ML/HR: .6; .31; .03; .02 INJECTION, SOLUTION INTRAVENOUS at 20:30

## 2020-08-26 RX ADMIN — ROPIVACAINE HYDROCHLORIDE: 2 INJECTION, SOLUTION EPIDURAL; INFILTRATION at 21:36

## 2020-08-26 RX ADMIN — ROPIVACAINE HYDROCHLORIDE 8 ML/HR: 2 INJECTION, SOLUTION EPIDURAL; INFILTRATION at 21:38

## 2020-08-26 RX ADMIN — LIDOCAINE HYDROCHLORIDE AND EPINEPHRINE 3 ML: 15; 5 INJECTION, SOLUTION EPIDURAL at 21:30

## 2020-08-26 RX ADMIN — SODIUM CHLORIDE, SODIUM LACTATE, POTASSIUM CHLORIDE, AND CALCIUM CHLORIDE 999 ML/HR: .6; .31; .03; .02 INJECTION, SOLUTION INTRAVENOUS at 21:20

## 2020-08-26 RX ADMIN — SODIUM CHLORIDE, SODIUM LACTATE, POTASSIUM CHLORIDE, AND CALCIUM CHLORIDE 125 ML/HR: .6; .31; .03; .02 INJECTION, SOLUTION INTRAVENOUS at 22:16

## 2020-08-26 RX ADMIN — Medication 250 UNITS: at 23:10

## 2020-08-26 NOTE — PROGRESS NOTES
L&D Triage Note - OB/GYN  Areli Santoro 31 y o  female MRN: [id number]  Unit/Bed#: L&D 322-01 Encounter: [id number]      Assessment:  31 y o   at 38w6d, GBS negative, with contractions    Plan:  1  Rule out labor  - SVE unchanged since triage evaluation at 0400  - [place] q5-7 minutes  - Discussed with patient that she is likely still in early labor, but given lack of cervical change, offered patient 2 hour recheck or option to return home with strict precautions to return if she begins madalyn every 2-3 minutes or if contraction pain worsens, she has loss of fluid, vaginal bleeding or decreased fetal movement  - Patient states she lives close to the hospital and will go home  - Discharge instructions provided to patient  - Plan to discharge home    Seen and evaluated with Dr Sadie Laguna   ______________________________________________________________________      Chief Compliant: contractions    TIME: 1415  Subjective:  31 y o  [id number] at 38w6d, GBS negative, with contractions  Patient states contractions started early this morning  She was evaluated in [facility] L&D triage early this morning with similar contractions and was noted to be 3/70/-1, unchanged after 2h recheck  Denies loss of fluid, vaginal bleeding, decreased fetal movement  Objective:  Vitals:    20 1316   BP: 129/66   Pulse: 101   Resp: 18   Temp: 98 7 °F (37 1 °C)     SVE: 3 / 80% / -2  FHT:  140 / Moderate 6 - 25 bpm / 15x15 accelerations present, no decelerations  [place]: q 5-7 minutes    Physical Exam  Constitutional:       Appearance: She is well-developed  Genitourinary:      No vaginal discharge  HENT:      Head: Normocephalic and atraumatic  Pulmonary:      Effort: Pulmonary effort is normal    Abdominal:      Palpations: Abdomen is soft  Tenderness: There is no abdominal tenderness  Comments: gravid   Neurological:      Mental Status: She is alert and oriented to person, place, and time     Skin: General: Skin is warm and dry  Psychiatric:         Behavior: Behavior normal    Vitals signs reviewed           Oral MD Jozef   OB/GYN PGY-1   8/26/2020 2:15 PM

## 2020-08-26 NOTE — TELEPHONE ENCOUNTER
[place] 35 Jason/ 1995/ Pt states that she is have strong contraction about every 5mins   She was at the ED earlier and was told to return if they are ever 2-3min but would like a call back as they are still occurring every 5min  Contacted via TC

## 2020-08-26 NOTE — PROGRESS NOTES
Triage Note - OB  Sherren Hearing 31 y o  female MRN: [id number]  Unit/Bed#: L&D 095-86 Encounter: [id number]    OB TRIAGE NOTE  Sherren Hearing  [id number]  8/26/2020  7:55 AM  L&D 322/L&D 322-01    ASSESS/PLAN:  31 y o  [id number] 38w6d with more frequent contractions, likely in early labor  SVE 3/70/-1, unchanged on 2 hour recheck  Discharge instructions  · Patient instructed to call if experiencing worsening contractions, vaginal bleeding, loss of fluid or decreased fetal movement  · eIOL scheduled 8/27    D/w Dr Angelina Hernandes  ______________    SUBJECTIVE    JILLIAN: Estimated Date of Delivery: 9/3/20    HPI Chronology:  31 y o  [id number] 38w6d presents with complaint of more frequent and painful contractures, now every 5-7 minutes  She denies leakage of fluid, vaginal bleeding, and has good fetal movement  Vitals:   /77 (BP Location: Right arm)   Pulse 86   Temp 99 4 °F (37 4 °C) (Oral)   Resp 18   Ht 5' (1 524 m)   Wt 76 7 kg (169 lb)   LMP 11/03/2019   BMI 33 01 kg/m²   Body mass index is 33 01 kg/m²  Physical Exam  Constitutional:       General: She is not in acute distress  Appearance: She is well-developed  HENT:      Head: Normocephalic and atraumatic  Pulmonary:      Effort: Pulmonary effort is normal  No respiratory distress  Abdominal:      Tenderness: There is no abdominal tenderness  There is no guarding  Comments: Gravid uterus   Genitourinary:     Vagina: No vaginal discharge  Musculoskeletal:         General: No tenderness  Skin:     General: Skin is warm and dry  Neurological:      Mental Status: She is alert and oriented to person, place, and time  Psychiatric:         Behavior: Behavior normal          Thought Content:  Thought content normal          FHT:  Baseline Rate: 130 bpm  Variability: Moderate 6-25 bpm  Accelerations: 15 x 15 or greater, At variable times  Decelerations: None     TOCO:   Contraction Frequency (minutes): 5-8  Contraction Duration (seconds): 60-80  Contraction Quality: Mild    Labs: No results found for this or any previous visit (from the past 24 hour(s))  Lab, Imaging and other studies: I have personally reviewed pertinent reports          Aamrjit Calero MD  8/26/2020  7:55 AM

## 2020-08-26 NOTE — DISCHARGE INSTRUCTIONS
Early Labor Signs   WHAT YOU SHOULD KNOW:   Early labor signs are changes in your body that allow your baby to pass through your birth canal   INSTRUCTIONS:   Signs and symptoms of early labor:   · Lightening  occurs when your baby drops inside your pelvis  You may feel increased pressure in your pelvis  This may happen a few weeks to a few hours before your labor begins  · Contractions  are cramps and tightening that occur in your uterus to help move the baby through your birth canal  Contractions occur regularly and more often each time  Each one lasts about 30 to 70 seconds, and gets stronger and more painful until you deliver your baby  Contractions do not go away with movement  They start in your lower back and move to the front in your abdomen  · Effacement  occurs when your cervix softens and thins, so it can easily open for the baby  Your primary healthcare provider [facility] or obstetrician will examine your cervix for effacement  · Dilation  is widening of your cervix, also for the baby's passage  Your PHP or obstetrician will examine your cervix for dilation  Your cervix will be fully opened and ready for delivery when it is dilated to 10 centimeters  · Increased discharge  from your vagina may occur  It may be pink, clear, or slightly bloody  This discharge may also be called bloody show  Bloody show is a mucus plug that forms and blocks your cervix during pregnancy  · Rupture of membranes  is a sudden release of clear fluid from your vagina  It is also known as when your water breaks  Your PHP or obstetrician may need to break your water if it does not break on its own  False labor: You may have false labor signs, which are also called [place] Pedersen contractions  False labor is common and may happen several weeks or days before your actual labor  The contractions are not regular, and do not get closer together  The pain is usually mild, does not worsen, and is felt only in front   Nils Pedersen contractions may happen later in the day, and stop after you change position, walk, or rest   Contact your PHP or obstetrician if:   · You have pain in your lower back or abdomen  · You have bloody mucus or show  · You have questions or concerns about your condition or care  Return to the emergency department if:   · You have regular, painful contractions that are less than 5 minutes apart, and last 30 to 70 seconds each  · You have heavy vaginal bleeding  · You have a constant trickle or sudden gush of clear fluid from your vagina  · You notice a sudden decrease in your baby's movement  © 2014 [address] is for End User's use only and may not be sold, redistributed or otherwise used for commercial purposes  All illustrations and images included in CareNotes® are the copyrighted property of [employer]  or [employer]  The above information is an  only  It is not intended as medical advice for individual conditions or treatments  Talk to your doctor, nurse or pharmacist before following any medical regimen to see if it is safe and effective for you

## 2020-08-26 NOTE — DISCHARGE INSTRUCTIONS
Pregnancy at [address] to [address]:   You are considered full term at the beginning of 37 weeks  Your breathing may be easier if your baby has moved down into a head-down position  You may need to urinate more often because the baby may be pressing on your bladder  You may also feel more discomfort and get tired easily  DISCHARGE INSTRUCTIONS:   Seek care immediately if:   · You develop a severe headache that does not go away  · You have new or increased vision changes, such as blurred or spotted vision  · You have new or increased swelling in your face or hands  · You have vaginal spotting or bleeding  · Your water broke or you feel warm water gushing or trickling from your vagina  Contact your healthcare provider if:   · You have more than 5 contractions in 1 hour  · You notice any changes in your baby's movements  · You have abdominal cramps, pressure, or tightening  · You have a change in vaginal discharge  · You have chills or a fever  · You have vaginal itching, burning, or pain  · You have yellow, green, white, or foul-smelling vaginal discharge  · You have pain or burning when you urinate, less urine than usual, or pink or bloody urine  · You have questions or concerns about your condition or care  How to care for yourself at this stage of your pregnancy:   · Eat a variety of healthy foods  Healthy foods include fruits, vegetables, whole-grain breads, low-fat dairy foods, beans, lean meats, and fish  Drink liquids as directed  Ask how much liquid to drink each day and which liquids are best for you  Limit caffeine to less than 200 milligrams each day  Limit your intake of fish to 2 servings each week  Choose fish low in mercury such as canned light tuna, shrimp, salmon, cod, or tilapia  Do not  eat fish high in mercury such as swordfish, tilefish, baudilio mackerel, and shark  · Take prenatal vitamins as directed    Your need for certain vitamins and minerals, such as folic acid, increases during pregnancy  Prenatal vitamins provide some of the extra vitamins and minerals you need  Prenatal vitamins may also help to decrease the risk of certain birth defects  · Rest as needed  Put your feet up if you have swelling in your ankles and feet  · Do not smoke  If you smoke, it is never too late to quit  Smoking increases your risk of a miscarriage and other health problems during your pregnancy  Smoking can cause your baby to be born too early or weigh less at birth  Ask your healthcare provider for information if you need help quitting  · Do not drink alcohol  Alcohol passes from your body to your baby through the placenta  It can affect your baby's brain development and cause fetal alcohol syndrome (FAS)  FAS is a group of conditions that causes mental, behavior, and growth problems  · Talk to your healthcare provider before you take any medicines  Many medicines may harm your baby if you take them when you are pregnant  Do not take any medicines, vitamins, herbs, or supplements without first talking to your healthcare provider  Never use illegal or street drugs (such as marijuana or cocaine) while you are pregnant  · Talk to your healthcare provider before you travel  You may not be able to travel in an airplane after 36 weeks  He may also recommend that you avoid long road trips  Safety tips:   · Avoid hot tubs and saunas  Do not use a hot tub or sauna while you are pregnant, especially during your first trimester  Hot tubs and saunas may raise your baby's temperature and increase the risk of birth defects  · Avoid toxoplasmosis  This is an infection caused by eating raw meat or being around infected cat feces  It can cause birth defects, miscarriages, and other problems  Wash your hands after you touch raw meat  Make sure any meat is well-cooked before you eat it  Avoid raw eggs and unpasteurized milk   Use gloves or ask someone else to clean your cat's litter box while you are pregnant  · Ask your healthcare provider about travel  The most comfortable time to travel is during the second trimester  Ask your healthcare provider if you can travel after 36 weeks  You may not be able to travel in an airplane after 36 weeks  He may also recommend that you avoid long road trips  Changes that are happening with your baby:  By 38 weeks, your baby may weigh between 6 and 9 pounds  Your baby may be about 14 inches long from the top of the head to the rump (baby's bottom)  Your baby hears well enough to know your voice  As your baby gets larger, you may feel fewer kicks and more stretching and rolling  Your baby may move into a head-down position  Your baby will also rest lower in your abdomen  What you need to know about prenatal care: Your healthcare provider will check your blood pressure and weight  You may also need the following:  · A urine test  may also be done to check for sugar and protein  These can be signs of gestational diabetes or infection  Protein in your urine may also be a sign of preeclampsia  Preeclampsia is a condition that can develop during week 20 or later of your pregnancy  It causes high blood pressure, and it can cause problems with your kidneys and other organs  · A blood test  may be done to check for anemia (low iron level)  · A Tdap vaccine  may be recommended by your healthcare provider  · A group B strep test  is a test that is done to check for group B strep infection  Group B strep is a type of bacteria that may be found in the vagina or rectum  It can be passed to your baby during delivery if you have it  Your healthcare provider will take swab your vagina or rectum and send the sample to the lab for tests  · Fundal height  is a measurement of your uterus to check your baby's growth  This number is usually the same as the number of weeks that you have been pregnant   Your healthcare provider may also check your baby's position  · Your baby's heart rate  will be checked  © 2017 [address] [employer] Information is for End User's use only and may not be sold, redistributed or otherwise used for commercial purposes  All illustrations and images included in CareNotes® are the copyrighted property of [employer]  or [employer]  The above information is an  only  It is not intended as medical advice for individual conditions or treatments  Talk to your doctor, nurse or pharmacist before following any medical regimen to see if it is safe and effective for you

## 2020-08-27 LAB — RPR SER QL: NORMAL

## 2020-08-27 PROCEDURE — 99024 POSTOP FOLLOW-UP VISIT: CPT | Performed by: OBSTETRICS & GYNECOLOGY

## 2020-08-27 RX ORDER — FERROUS SULFATE 325(65) MG
325 TABLET ORAL
Status: DISCONTINUED | OUTPATIENT
Start: 2020-08-28 | End: 2020-08-28 | Stop reason: HOSPADM

## 2020-08-27 RX ORDER — MEDROXYPROGESTERONE ACETATE 150 MG/ML
150 INJECTION, SUSPENSION INTRAMUSCULAR ONCE
Status: COMPLETED | OUTPATIENT
Start: 2020-08-27 | End: 2020-08-27

## 2020-08-27 RX ADMIN — BENZOCAINE AND LEVOMENTHOL: 200; 5 SPRAY TOPICAL at 01:18

## 2020-08-27 RX ADMIN — IBUPROFEN 600 MG: 600 TABLET ORAL at 21:16

## 2020-08-27 RX ADMIN — IBUPROFEN 600 MG: 600 TABLET ORAL at 14:01

## 2020-08-27 RX ADMIN — MEDROXYPROGESTERONE ACETATE 150 MG: 150 INJECTION, SUSPENSION, EXTENDED RELEASE INTRAMUSCULAR at 14:01

## 2020-08-27 RX ADMIN — DOCUSATE SODIUM 100 MG: 100 CAPSULE, LIQUID FILLED ORAL at 08:31

## 2020-08-27 RX ADMIN — ACETAMINOPHEN 650 MG: 325 TABLET ORAL at 18:45

## 2020-08-27 RX ADMIN — DOCUSATE SODIUM 100 MG: 100 CAPSULE, LIQUID FILLED ORAL at 18:44

## 2020-08-27 RX ADMIN — IBUPROFEN 600 MG: 600 TABLET ORAL at 00:45

## 2020-08-27 RX ADMIN — IBUPROFEN 600 MG: 600 TABLET ORAL at 07:19

## 2020-08-27 RX ADMIN — ACETAMINOPHEN 650 MG: 325 TABLET ORAL at 06:05

## 2020-08-27 RX ADMIN — WITCH HAZEL 1 PAD: 500 SOLUTION RECTAL; TOPICAL at 01:18

## 2020-08-27 NOTE — PLAN OF CARE
Problem: Potential for Falls  Goal: Patient will remain free of falls  Description: INTERVENTIONS:  - Assess patient frequently for physical needs  -  Identify cognitive and physical deficits and behaviors that affect risk of falls    -  [place] fall precautions as indicated by assessment   - Educate patient/family on patient safety including physical limitations  - Instruct patient to call for assistance with activity based on assessment  - Modify environment to reduce risk of injury  - Consider OT/PT consult to assist with strengthening/mobility  2020 by Josephine Byrd RN  Outcome: Progressing  2020 by Josephine Byrd RN  Outcome: Progressing     Problem: POSTPARTUM  Goal: Experiences normal postpartum course  Description: INTERVENTIONS:  - Monitor maternal vital signs  - Assess uterine involution and lochia  Outcome: Progressing  Goal: Appropriate maternal -  bonding  Description: INTERVENTIONS:  - Identify family support  - Assess for appropriate maternal/infant bonding   -Encourage maternal/infant bonding opportunities  - Referral to  or  as needed  Outcome: Progressing  Goal: Establishment of infant feeding pattern  Description: INTERVENTIONS:  - Assess breast/bottle feeding  - Refer to lactation as needed  Outcome: Progressing  Goal: Incision(s), wounds(s) or drain site(s) healing without S/S of infection  Description: INTERVENTIONS  - Assess and document risk factors for skin impairment   - Assess and document dressing, incision, wound bed, drain sites and surrounding tissue  - Consider nutrition services referral as needed  - Oral mucous membranes remain intact  - Provide patient/ family education  Outcome: Progressing     Problem: PAIN - ADULT  Goal: Verbalizes/displays adequate comfort level or baseline comfort level  Description: Interventions:  - Encourage patient to monitor pain and request assistance  - Assess pain using appropriate pain scale  - Administer analgesics based on type and severity of pain and evaluate response  - Implement non-pharmacological measures as appropriate and evaluate response  - Consider cultural and social influences on pain and pain management  - Notify physician/advanced practitioner if interventions unsuccessful or patient reports new pain  Outcome: Progressing     Problem: INFECTION - ADULT  Goal: Absence or prevention of progression during hospitalization  Description: INTERVENTIONS:  - Assess and monitor for signs and symptoms of infection  - Monitor lab/diagnostic results  - Monitor all insertion sites, i e  indwelling lines, tubes, and drains  - Monitor endotracheal if appropriate and nasal secretions for changes in amount and color  - [place] appropriate cooling/warming therapies per order  - Administer medications as ordered  - Instruct and encourage patient and family to use good hand hygiene technique  - Identify and instruct in appropriate isolation precautions for identified infection/condition  Outcome: Progressing  Goal: Absence of fever/infection during neutropenic period  Description: INTERVENTIONS:  - Monitor WBC    Outcome: Progressing     Problem: SAFETY ADULT  Goal: Patient will remain free of falls  Description: INTERVENTIONS:  - Assess patient frequently for physical needs  -  Identify cognitive and physical deficits and behaviors that affect risk of falls    -  [place] fall precautions as indicated by assessment   - Educate patient/family on patient safety including physical limitations  - Instruct patient to call for assistance with activity based on assessment  - Modify environment to reduce risk of injury  - Consider OT/PT consult to assist with strengthening/mobility  8/27/2020 1929 by Juan Chaudhry RN  Outcome: Progressing  8/27/2020 1928 by Juan Chaudhry, RN  Outcome: Progressing  Goal: Maintain or return to baseline ADL function  Description: INTERVENTIONS:  -  Assess patient's ability to carry out ADLs; assess patient's baseline for ADL function and identify physical deficits which impact ability to perform ADLs (bathing, care of mouth/teeth, toileting, grooming, dressing, etc )  - Assess/evaluate cause of self-care deficits   - Assess range of motion  - Assess patient's mobility; develop plan if impaired  - Assess patient's need for assistive devices and provide as appropriate  - Encourage maximum independence but intervene and supervise when necessary  - Involve family in performance of ADLs  - Assess for home care needs following discharge   - Consider OT consult to assist with ADL evaluation and planning for discharge  - Provide patient education as appropriate  Outcome: Progressing  Goal: Maintain or return mobility status to optimal level  Description: INTERVENTIONS:  - Assess patient's baseline mobility status (ambulation, transfers, stairs, etc )    - Identify cognitive and physical deficits and behaviors that affect mobility  - Identify mobility aids required to assist with transfers and/or ambulation (gait belt, sit-to-stand, lift, walker, cane, etc )  - [place] fall precautions as indicated by assessment  - Record patient progress and toleration of activity level on Mobility SBAR; progress patient to next Phase/Stage  - Instruct patient to call for assistance with activity based on assessment  - Consider rehabilitation consult to assist with strengthening/weightbearing, etc   Outcome: Progressing     Problem: Knowledge Deficit  Goal: Patient/family/caregiver demonstrates understanding of disease process, treatment plan, medications, and discharge instructions  Description: Complete learning assessment and assess knowledge base    Interventions:  - Provide teaching at level of understanding  - Provide teaching via preferred learning methods  Outcome: Progressing     Problem: DISCHARGE PLANNING  Goal: Discharge to home or other facility with appropriate resources  Description: INTERVENTIONS:  - Identify barriers to discharge w/patient and caregiver  - Arrange for needed discharge resources and transportation as appropriate  - Identify discharge learning needs (meds, wound care, etc )  - Arrange for interpretive services to assist at discharge as needed  - Refer to Case Management Department for coordinating discharge planning if the patient needs post-hospital services based on physician/advanced practitioner order or complex needs related to functional status, cognitive ability, or social support system  Outcome: Progressing

## 2020-08-27 NOTE — DISCHARGE INSTRUCTIONS
Self Care After Delivery   AMBULATORY CARE:   The postpartum period  is the period of time from delivery to about 6 weeks  During this time you may experience many physical and emotional changes  It is important to understand what is normal and when you need to call your healthcare provider  It is also important to know how to care for yourself during this time  Call 911 for any of the following:   · You soak through 1 pad in 15 minutes, have blurry vision, clammy or pale skin, and feel faint  · You faint or lose consciousness  · Your heart is beating faster than normal      · You have trouble breathing  · You cough up blood  Seek care immediately if:   · You soak through 1 or more pads in an hour, or pass blood clots larger than a quarter from your vagina  · Your leg is painful, red, and larger than normal      · You have severe abdominal pain  · You have a bad headache or changes in your vision  · Your episiotomy or C section incision is red, swollen, bleeding, or draining pus  · Your incision comes apart  · You see or hear things that are not there, or have thoughts of harming yourself or your baby  Contact your obstetrician or midwife if:   · You have a fever  · You have new or worsening pain in your abdomen or vagina  · You continue to have the baby blues 10 days after you deliver  · You have trouble sleeping  · You have foul-smelling discharge from your vagina  · You have pain or burning when you urinate  · You do not have a bowel movement for 3 days or more  · You have nausea or are vomiting  · You have hard lumps or red streaks over your breasts  · You have cracked nipples or bleed from your nipples  · You have questions or concerns about your condition or care  Physical changes:   The following are normal changes after you give birth:  · Pain in the area between your anus and vagina    · Breast pain    · Constipation or hemorrhoids    · Hot or cold flashes    · Vaginal bleeding or discharge    · Mild to moderate abdominal cramping    · Difficulty controlling bowel movements or urine  Emotional changes: The following are symptoms of the baby blues, or normal emotions after you give birth  The changes in your emotions may be caused by a drop in hormone levels after you deliver  If these symptoms last longer than 1 to 2 weeks after you give birth, you may have postpartum depression  · Feeling irritable    · Feeling sad    · Crying for no reason    · Feeling anxious  Breast care for nursing mothers: You may have sore breasts for 3 to 6 days after you give birth  This happens as your milk begins to fill your breasts  You may also have sore breasts if you do not breastfeed frequently  Do the following to care for your breasts:  · Apply a moist, warm, compress to your breast as directed  This may help soothe your breasts  Make sure the washcloth is not too hot before you apply it to your breast      · Nurse your baby or pump your milk frequently  This may prevent clogged milk ducts  Ask your healthcare provider how often to nurse or pump  · Massage your breasts as directed  This may help increase your milk flow  Gently rub your breasts in a circular motion before you breastfeed  You may need to gently squeeze your breast or nipple to help release milk  You can also use a breast pump to help release milk from your breast      · Wash your breasts with warm water only  Do not put soap on your nipples  Soap may cause your nipples to become dry  · Apply lanolin cream to your nipples as directed  Lanolin cream may add moisture to your skin and prevent nipple dryness  Always  wash off lanolin cream with warm water before you breastfeed  · Place pads in your bra  Your nipples may leak milk when you are not breastfeeding  You can place pads inside of your bra to help prevent leaking onto your clothing   Ask your healthcare provider where to purchase bra pads  · Get breastfeeding support if needed  There are healthcare providers who can answer questions about breastfeeding and provide you with support  Ask your healthcare provider who you can contact if you need breastfeeding support  Breast care for non-nursing mothers:  Milk will fill your breasts even if you bottle feed your baby  Do the following to help stop your milk from filling your breasts and causing pain:  · Wear a bra with support at all times  A sports bra or a tight-fitting bra will help stop your milk from coming in  · Apply ice on each breast for 15 to 20 minutes every hour or as directed  Use an ice pack, or put crushed ice in a plastic bag  Cover it with a towel  Ice helps your milk ducts shrink  · Keep your breasts away from warm water  Warm water will make it easier for milk to fill your breasts  Stand with your breasts away from warm water in the shower  · Limit how much you touch your breasts  This will prevent them from filling with milk  Perineum care: Your perineum is the area between your rectum and vagina  It is normal to have swelling and pain in this area after you give birth  If you had an episiotomy, your healthcare provider may give you special instructions  · Clean your perineum after you use the bathroom  This may prevent infection and help with healing  Use a spray bottle with warm water to clean your perineum  You may also gently spray warm water against your perineum when you urinate  Always wipe front to back  · Take a sitz bath as directed  A sitz bath may help relieve swelling and pain  Fill your bath tub or bucket with water up to your hips and sit in the water  Use cold water for 2 days after you deliver  Then use warm water  Ask your healthcare provider for more information about a sitz bath  · Apply ice packs for the first 24 hours or as directed  Use a plastic glove filled with ice or buy an ice pack   Wrap the ice pack or plastic glove in a small towel or wash cloth  Place the ice pack on your perineum for 20 minutes at a time  · Sit on a donut-shaped pillow  This may relieve pressure on your perineum when you sit  · Use wipes with medicine or take pills as directed  Your healthcare provider may tell you to use witch hazel pads  You can place witch hazel pads in the refrigerator before you apply them to your perineum  He may also tell you to take NSAIDs  Ask your healthcare provider how often to take pills or use wipes with medicine  · Do not go swimming or take tub baths for 4 to 6 weeks or as directed  This will help prevent an infection in your vagina or uterus  Bowel and bladder care: It may take 3 to 5 days to have a bowel movement after you deliver your baby  You can do the following to prevent or manage constipation, and get control of your bowel or bladder:  · Take stool softeners as directed  A stool softener is medicine that will make your bowel movements softer  This may prevent or relieve constipation  A stool softener may also make bowel movements less painful  · Drink plenty of liquids  Ask how much liquid to drink each day and which liquids are best for you  Liquids may help prevent constipation  · Eat foods high in fiber  Examples include fruits, vegetables, grains, beans, and lentils  Ask your healthcare provider how much fiber you need each day  Fiber may prevent constipation  · Do Kegel exercises as directed  Kegel exercises will help strengthen the muscles that control bowel movements and urination  Ask your healthcare provider for more information on Kegel exercises  · Apply cold compresses or medicine to hemorrhoids as directed  This may relieve swelling and pain  Your healthcare provider may tell you to apply ice or wipes with medicine to your hemorrhoids  He may also tell you to use a sitz bath   Ask your healthcare for more information on how to manage hemorrhoids  Nutrition:  Good nutrition is important in the postpartum period  It will help you return to a healthy weight, increase your energy levels, and prevent constipation  It will also help you get enough nutrients and calories if you are going to breastfeed your baby  · Eat a variety of healthy foods  Healthy foods include fruits, vegetables, whole-grain breads, low-fat dairy products, beans, lean meats, and fish  You may need 500 to 700 additional calories each day if you breastfeed your baby  You may also need extra protein  · Limit foods with added sugar and high amounts of fat  These foods are high in calories and low in healthy nutrients  Read food labels so you know how much sugar and fat is in the food you want to eat  · Drink 8 to 10 glasses of water per day  Water will help you make plenty of milk for your baby  It will also help prevent constipation  Drink a glass of water every time you breastfeed your baby  · Take vitamins as directed  Ask your healthcare provider what vitamins you need  · Limit caffeine and alcohol if you are breastfeeding  Caffeine and alcohol can get into your breast milk  Caffeine and alcohol can make your baby fussy  They can also interfere with your baby's sleep  Ask your healthcare provider if you can drink alcohol or caffeine  Rest and sleep: You may feel very tired in the postpartum period  Enough sleep will help you heal and give you energy to care for your baby  The following may help you get sleep and rest:  · Nap when your baby naps  Your baby may nap several times during the day  Get rest during this time  · Limit visitors  Many people may want to see you and your baby  Ask friends or family to visit on different days  This will give you time to rest      · Do not plan too much for one day  Put off household chores so that you have time to rest  Gradually do more each day  · Ask for help from family, friends, or neighbors    Ask them to help you with laundry, cleaning, or errands  Also ask someone to watch the baby while you take a nap or relax  Ask your partner to help with the care of your baby  Pump some of your breast milk so your partner can feed your baby during the night  Exercise after delivery:  Wait until your healthcare provider says it is okay to exercise  Exercise can help you lose weight, increase your energy levels, and manage your mood  It can also prevent constipation and blood clots  Start with gentle exercises such as walking  Do more as you have more energy  You may need to avoid abdominal exercises for 1 to 2 weeks after you deliver  Talk to your healthcare provider about an exercise plan that is right for you  Sexual activity after delivery:   · Do not have sex until your healthcare provider says it is okay  You may need to wait 4 to 6 weeks before you have sex  This may prevent infection and allow time to heal      · Your menstrual cycle may begin as soon as 3 weeks after you deliver  Your period may be delayed if you breastfeed your baby  You can become pregnant before you get your first postpartum period  Talk to your healthcare provider about birth control that is right for you  Some types of birth control are not safe during breastfeeding  For support and more information:  Join a support group for new mothers  Ask for help from family and friends with chores, errands, and care of your baby  · Office of Women's Health, [place] Department of Health and Human Services  [address], [address] [place]  [place] , [address]  [address], [address] [place]  [place] , [address]  Phone: 2- 586 - [phone]  Web Address: www womenshealth gov  · [facility] Postpartum [address] [facility] , [address] [place] [address]  [address]  Web Address: [employer]  org/pregnancy/postpartum-care  aspx  Follow up with your obstetrician or midwife as directed:   You will need to follow up with your healthcare provider in 2 to 6 weeks after delivery  Write down your questions so you remember to ask them at your visits  © 2017 [address] [employer] Information is for End User's use only and may not be sold, redistributed or otherwise used for commercial purposes  All illustrations and images included in CareNotes® are the copyrighted property of A D A M , Inc  or [employer]  The above information is an  only  It is not intended as medical advice for individual conditions or treatments  Talk to your doctor, nurse or pharmacist before following any medical regimen to see if it is safe and effective for you

## 2020-08-27 NOTE — TELEPHONE ENCOUNTER
Patient called at [address] [facility] c/o stronger contractions q2-3 mins apart   Advised to proceed to L and D  L and D notified

## 2020-08-27 NOTE — DISCHARGE SUMMARY
Discharge Summary - OB/GYN   Sherren Hearing 31 y o  female MRN: [id number]  Unit/Bed#: L&D 321-01 Encounter: [id number]      Admission Date: 2020     Discharge Date: 2020    Admitting Diagnosis:   1  Pregnancy at 38w6d  2  Mild intermittent asthma    Discharge Diagnosis:   Same, delivered      Procedures: spontaneous vaginal delivery    Admitting and Delivery Attending: Mihaela Boone MD  Discharge Attending: Dr Beryle Post Course:   Ms Sherren Hearing is a 31 y o  [id number] at 38wk6d  She presented to labor and delivery in active labor and was 5-6/80/-1  She received an epidural, had spontaneous rupture of membranes, and was expectantly managed  She progressed to complete cervical dilation prior to pushing  She delivered a viable male  on 2020 at 2308  Weight 6lbs 3 1oz via spontaneous vaginal delivery  Apgars were 8 (1 min) and 9 (5 min)   stayed in the delivery room after birth  Patient tolerated the procedure well and was transferred to recovery in stable condition  Her postpartum course was uncomplicated  Her postoperative pain was well controlled with oral analgesics  On day of discharge, she was ambulating and able to reasonably perform all ADLs  She was voiding and had appropriate bowel function  Pain was well controlled  She was discharged home on post-operative day #2 without complications  Patient was instructed to follow up with her OB as an outpatient and was given appropriate warnings to call provider if she develops signs of infection or uncontrolled pain  Complications: none apparent    Condition at discharge: good     Discharge instructions/Information to patient and family:   See after visit summary for information provided to patient and family  Provisions for Follow-Up Care:  See after visit summary for information related to follow-up care and any pertinent home health orders        Disposition: Home    Planned Readmission: No    Discharge Medications: For a complete list of the patient's medications, please refer to her med rec      Charlotte Moya DO  Obstetrics & Gynecology PGY-1  8/28/2020  6:22 AM

## 2020-08-27 NOTE — OB LABOR/OXYTOCIN SAFETY PROGRESS
Labor Progress Note - Arely Guo 31 y o  female MRN: [id number]    Unit/Bed#: L&D 321-01 Encounter: [id number]       Contraction Frequency (minutes): 2-3  Contraction Quality: Mild  Tachysystole: No   Cervical Dilation: 8-9        Cervical Effacement: 90  Fetal Station: -1  Baseline Rate: 140 bpm                     Vital Signs:   Vitals:    08/26/20 2208   BP: 108/64   Pulse: 84   Resp:    Temp:      No membranes palpated on exam, small gush of fluid noted  Patient comfortable with epidural  Pt repositioned        Notes/comments:        Messi Vital MD 8/26/2020 10:28 PM

## 2020-08-27 NOTE — PLAN OF CARE
Problem: Potential for Falls  Goal: Patient will remain free of falls  Description: INTERVENTIONS:  - Assess patient frequently for physical needs  -  Identify cognitive and physical deficits and behaviors that affect risk of falls  -  Bailey fall precautions as indicated by assessment   - Educate patient/family on patient safety including physical limitations  - Instruct patient to call for assistance with activity based on assessment  - Modify environment to reduce risk of injury  - Consider OT/PT consult to assist with strengthening/mobility  8/27/2020 0243 by Camelia Leon RN  Outcome: Progressing  8/26/2020 2050 by Camelia Leon RN  Outcome: Progressing     Problem: Labor & Delivery  Goal: Manages discomfort  Description: Assess and monitor for signs and symptoms of discomfort  Assess patient's pain level regularly and per hospital policy  Administer medications as ordered  Support use of nonpharmacological methods to help control pain such as distraction, imagery, relaxation, and application of heat and cold  Collaborate with interdisciplinary team and patient to determine appropriate pain management plan  1  Include patient in decisions related to comfort  2  Offer non-pharmacological pain management interventions  3  Report ineffective pain management to physician   8/27/2020 0243 by Camelia Leon RN  Outcome: Completed  8/26/2020 2050 by Camelia Leon RN  Outcome: Progressing  Goal: Patient vital signs are stable  Description: 1  Assess vital signs - vaginal delivery    8/27/2020 0243 by Camelia Leon RN  Outcome: Completed  8/26/2020 2050 by Camelia Leon RN  Outcome: Progressing

## 2020-08-27 NOTE — ANESTHESIA POSTPROCEDURE EVALUATION
Post-Op Assessment Note    CV Status:  Stable  Pain Score: 0    Pain management: adequate     Mental Status:  Alert and awake   Hydration Status:  Euvolemic and stable   PONV Controlled:  Controlled   Airway Patency:  Patent      Post Op Vitals Reviewed: Yes      Staff: Anesthesiologist     Post-op block assessment: site cleaned, catheter intact and no complications      No complications documented      BP      Temp      Pulse     Resp      SpO2

## 2020-08-27 NOTE — L&D DELIVERY NOTE
Vaginal Delivery Summary - OB/GYN   Collette Shea 31 y o  female MRN: [id number]  Unit/Bed#: L&D 321-01 Encounter: [id number]          Predelivery Diagnosis:  1  Pregnancy at 38w6d  2  Mild intermittent asthma     Postdelivery Diagnosis:  1  Same as above  2  Delivery of term     Procedure: Spontaneous Vaginal Delivery    Attending: Edwin Jones    Assistant: Roberto Calixto     Anesthesia: Epidural    QBL: 50cc  Admission Hg: 10 3  Admission platelets: 861    Complications: none apparent    Specimens: cord blood, arterial and venous cord blood gasses, placenta to storage    Findings:   1  Viable male at 2308, with APGARS of 8 and 9 at 1 and 5 minutes respectively,  2  Spontaneous delivery of intact placenta at 2311  3  Blood gases:    Umbilical Cord Venous Blood Gas:  Results from last 7 days   Lab Units 20  2310   PH COV  7 228   PCO2 COV mm HG 62 2*   HCO3 COV mmol/L 25 3   BASE EXC COV mmol/L -3 5*   O2 HGB, VENOUS CORD % 7 5     Umbilical Cord Arterial Blood Gas:  Results from last 7 days   Lab Units 20  2310   PH COA  7 306   PCO2 COA  49 7   PO2 COA mm HG 14 6   HCO3 COA mmol/L 24 2   BASE EXC COA mmol/L -2 6*   O2 CONTENT CORD ART ml/dl 4 8   O2 HGB, ARTERIAL CORD % 22 3       Disposition:  Patient tolerated the procedure well and was recovering in labor and delivery room     Brief history and labor course:  Ms Collette Shea is a 31 y o  [id number] at 38wk6d  She presented to labor and delivery in active labor and was 5-6/80/-1  She received an epidural, had spontaneous rupture of membranes, and was expectantly managed  She progressed to complete cervical dilation prior to pushing  Description of procedure    After pushing for 1 minutes, at 2308 patient delivered a viable male , wt pending, apgars of 8 (1 min) and 9 (5 min)  The fetal vertex delivered spontaneously  Baby was checked for nuchal, which was not present   The anterior shoulder delivered atraumatically with maternal expulsive forces and the assistance of downward traction  The posterior shoulder delivered with maternal expulsive forces and the assistance of upward traction  The remainder of the fetus delivered spontaneously  Upon delivery, the infant was placed on the mothers abdomen and the cord was clamped and cut  Delayed cord clamping was performed  The infant was noted to cry spontaneously and was moving all extremities appropriately  There was no evidence for injury  Awaiting nurse resuscitators evaluated the   Arterial and venous cord blood gases and cord blood was collected for analysis  These were promptly sent to the lab  In the immediate post-partum, 30 units of IV pitocin was administered, and the uterus was noted to contract down well with massage and pitocin  The placenta delivered spontaneously at 2311 and was noted to have a centrally inserted 3 vessel cord  The vagina, cervix, perineum, and rectum were inspected and was noted to be intact  At the conclusion of the procedure, all needle, sponge, and instrument counts were noted to be correct  Patient tolerated the procedure well and was allowed to recover in labor and delivery room with family and  before being transferred to the post-partum floor  Dr Jennifer Villanueva was present and participated in all key portions of the case          Tremayne Norton MD  2020  11:58 PM

## 2020-08-27 NOTE — H&P
H&P Exam - Obstetrics   Ermias Oliveira 31 y o  female MRN: [id number]  Unit/Bed#: L&D 321-01 Encounter: [id number]      ASSESSMENT:  32yo  at 38w6d weeks gestation who is being admitted for active labor  EFW: 7 lbs    PLAN:    Pregnancy at 38w6d  Admit  Follow up CBC, RPR, Blood Type  Start with expectant management   GBS negative status  Analgesia and/or epidural at patient request  Anticipate     Discussed with Dr Sheri Hill       This patient will be an INPATIENT  and I certify the anticipated length of stay is >2 Midnights  History of Present Illness     Chief Complaint: Contractions    HPI:  Ermias Oliveira is a 31 y o  [id number] female with an JILLIAN of 9/3/2020, by Ultrasound at 38w6d weeks gestation who is being admitted for more frequent and painful contractions  She denies leakage of fluid, vaginal bleeding, and has good fetal movement  This has been an uncomplicated pregnancy  Her obstetric history is significant for 2 uncomplicated SVDs in  and   She is a [facility] patient  PREGNANCY COMPLICATIONS:   1  Mild intermittent asthma    OB History    Para Term  AB Living   4 2 2   1 2   SAB TAB Ectopic Multiple Live Births   1     0 2      # Outcome Date GA Lbr Ronald/2nd Weight Sex Delivery Anes PTL Lv   4 Current            3 Term 17 38w2d / 00:36 3260 g (7 lb 3 oz) M Vag-Spont EPI N MARGO   2 Term 13 40w0d   M    MARGO   1 SAB                Baby complications/comments: None    Review of Systems   Constitutional: Negative for chills and fever  Eyes: Negative for visual disturbance  Respiratory: Negative for cough and shortness of breath  Cardiovascular: Negative for chest pain, palpitations and leg swelling  Gastrointestinal: Negative for abdominal pain, diarrhea, nausea and vomiting  Genitourinary: Negative for dysuria, hematuria, pelvic pain, vaginal bleeding, vaginal discharge and vaginal pain     Neurological: Negative for dizziness, light-headedness and headaches  Historical Information   Past Medical History:   Diagnosis Date    Abnormal Pap smear of cervix     Anemia     Asthma     Gonorrhea     Migraine     tylenol    Miscarriage     Nexplanon removal     last assessed 02/07/17    Urinary tract infection     Varicella     patient had chicken pox     Past Surgical History:   Procedure Laterality Date    TONSILLECTOMY       Social History   Social History     Substance and Sexual Activity   Alcohol Use Not Currently    Comment: social     Social History     Substance and Sexual Activity   Drug Use No     Social History     Tobacco Use   Smoking Status Never Smoker   Smokeless Tobacco Never Used     Family History: non-contributory    Meds/Allergies      Medications Prior to Admission   Medication    albuterol (PROVENTIL HFA,VENTOLIN HFA) 90 mcg/act inhaler    ferrous sulfate 324 (65 Fe) mg    Prenatal Vit w/Up-Kaxmbpzwe-YE (PNV PO)        Allergies   Allergen Reactions    Fruit Extracts      Category: Allergy; Annotation - [id number]: pomegranite lotion has skin reaction, pomegranite fruit has itching and swelling       OBJECTIVE:    Vitals: Last menstrual period 11/03/2019, not currently breastfeeding  There is no height or weight on file to calculate BMI  Physical Exam  Constitutional:       General: She is not in acute distress  Appearance: She is well-developed  HENT:      Head: Normocephalic and atraumatic  Cardiovascular:      Rate and Rhythm: Normal rate and regular rhythm  Heart sounds: Normal heart sounds  Pulmonary:      Effort: Pulmonary effort is normal  No respiratory distress  Breath sounds: Normal breath sounds  Abdominal:      Tenderness: There is no abdominal tenderness  There is no guarding  Comments: Gravid uterus   Musculoskeletal:         General: No tenderness  Skin:     General: Skin is warm and dry     Neurological:      Mental Status: She is alert and oriented to person, place, and time  Psychiatric:         Behavior: Behavior normal          Thought Content:  Thought content normal          Cervix:    5-6/80/-1    Fetal heart rate:    130 BPM, reactive with moderate variability and no decelerations    [place]:    Every 2-3 minutes     GBS: Negative     Prenatal Labs:   Blood Type:   Lab Results   Component Value Date/Time    ABO Grouping O 02/13/2020 04:40 PM     , D (Rh type):   Lab Results   Component Value Date/Time    Rh Factor Positive 02/13/2020 04:40 PM      HCT/HGB:   Lab Results   Component Value Date/Time    Hematocrit 32 7 (L) 08/26/2020 08:31 PM    Hemoglobin 10 3 (L) 08/26/2020 08:31 PM    Hemoglobin 9 8 10/30/2017 02:22 PM      , MCV:   Lab Results   Component Value Date/Time    MCV 81 (L) 08/26/2020 08:31 PM      , Platelets:   Lab Results   Component Value Date/Time    Platelets 833 36/13/9703 08:31 PM      , 1 hour Glucola:   Lab Results   Component Value Date/Time    Glucose 91 06/05/2020 09:46 AM   ,  Rubella:   Lab Results   Component Value Date/Time    Rubella IgG Quant 29 9 02/13/2020 04:40 PM        , VDRL/RPR:   Lab Results   Component Value Date/Time    RPR Non-Reactive 06/05/2020 09:46 AM      , Urine Culture/Screen:   Lab Results   Component Value Date/Time    Urine Culture 2210-4843 cfu/ml  07/30/2020 09:22 AM      Hep B:   Lab Results   Component Value Date/Time    Hepatitis B Surface Ag Non-reactive 02/13/2020 04:40 PM   HIV:   Lab Results   Component Value Date/Time    HIV-1/HIV-2 Ab Non-Reactive 02/13/2020 04:40 PM     , Chlamydia: Negative  Gonorrhea:   Lab Results   Component Value Date/Time    N gonorrhoeae, DNA Probe Negative 01/23/2020 10:49 AM     , Group B Strep:    Lab Results   Component Value Date/Time    Strep Grp B ÁNGEL Negative 08/05/2020 04:28 PM          Invasive Devices     None

## 2020-08-27 NOTE — ANESTHESIA PROCEDURE NOTES
Epidural Block    Patient location during procedure: OB  Start time: 8/26/2020 9:31 PM  Reason for block: procedure for pain and at surgeon's request  Staffing  Anesthesiologist: Aria England DO  Performed: anesthesiologist   Preanesthetic Checklist  Completed: patient identified, site marked, surgical consent, pre-op evaluation, timeout performed, IV checked, risks and benefits discussed and monitors and equipment checked  Epidural  Patient position: sitting  Prep: ChloraPrep  Patient monitoring: cardiac monitor and frequent blood pressure checks  Approach: midline  Location: lumbar (1-5)  Injection technique: KAT air  Needle  Needle type: Tuohy   Needle gauge: 18 G  Catheter type: side hole  Catheter size: 20 G  Test dose: negativelidocaine 1 5% with epinephrine 1:200,000 test dose, 3 mL  Assessment  Sensory level: [id number] aspiration for CSF, negative aspiration for heme and no paresthesia on injection  patient tolerated the procedure well with no immediate complications

## 2020-08-27 NOTE — PROGRESS NOTES
Progress Note - OB/GYN   Mahad Armstrong 31 y o  female MRN: [id number]  Unit/Bed#: L&D 305-01 Encounter: [id number]    Assessment:  Post partum Day #1 s/p , stable, baby in room    Plan:  1) Asthma   Asymptomatic    Albuterol prn  2) Continue routine post partum care   Encourage ambulation   Encourage breastfeeding   Anticipate discharge tomorrow, PPD#2     Subjective/Objective   Chief Complaint:     Post delivery @ 0911 34 76 33   Patient is doing well  Lochia WNL  Pain well controlled  No complaints  Breastfeeding    Subjective:     Pain: yes, cramping, improved with meds  Tolerating PO: yes  Voiding: yes  Flatus: yes  BM: no  Ambulating: yes  Chest pain: no  Shortness of breath: no  Leg pain: no  Lochia: minimal    Objective:     Vitals: /67 (BP Location: Left arm)   Pulse 100   Temp 98 2 °F (36 8 °C) (Oral)   Resp 18   Ht 5' (1 524 m)   Wt 77 1 kg (170 lb)   LMP 2019   Breastfeeding Unknown   BMI 33 20 kg/m²       Intake/Output Summary (Last 24 hours) at 2020 0643  Last data filed at 2020 0601  Gross per 24 hour   Intake 1764 9 ml   Output 1100 ml   Net 664 9 ml       Lab Results   Component Value Date    WBC 13 48 (H) 2020    HGB 10 3 (L) 2020    HCT 32 7 (L) 2020    MCV 81 (L) 2020     2020       Physical Exam:     Gen: AAOx3, NAD  CV: RRR  Lungs: CTA b/l  Abd: Soft, non-tender, non-distended, no rebound or guarding  Uterine fundus firm and non-tender, at the level of the umbilicus     Ext: Non tender    Pamela Mead MD   OB/GYN PGY-1  2020  6:43 AM

## 2020-08-27 NOTE — PLAN OF CARE
Problem: Potential for Falls  Goal: Patient will remain free of falls  Description: INTERVENTIONS:  - Assess patient frequently for physical needs  -  Identify cognitive and physical deficits and behaviors that affect risk of falls  -  [place] fall precautions as indicated by assessment   - Educate patient/family on patient safety including physical limitations  - Instruct patient to call for assistance with activity based on assessment  - Modify environment to reduce risk of injury  - Consider OT/PT consult to assist with strengthening/mobility  Outcome: Progressing     Problem: Knowledge Deficit  Goal: Verbalizes understanding of labor plan  Description: Assess patient/family/caregiver's baseline knowledge level and ability to understand information  Provide education via patient/family/caregiver's preferred learning method at appropriate level of understanding  1  Provide teaching at level of understanding  2  Provide teaching via preferred learning method(s)  Outcome: Progressing     Problem: Labor & Delivery  Goal: Manages discomfort  Description: Assess and monitor for signs and symptoms of discomfort  Assess patient's pain level regularly and per hospital policy  Administer medications as ordered  Support use of nonpharmacological methods to help control pain such as distraction, imagery, relaxation, and application of heat and cold  Collaborate with interdisciplinary team and patient to determine appropriate pain management plan  1  Include patient in decisions related to comfort  2  Offer non-pharmacological pain management interventions  3  Report ineffective pain management to physician  Outcome: Progressing  Goal: Patient vital signs are stable  Description: 1  Assess vital signs - vaginal delivery    Outcome: Progressing

## 2020-08-27 NOTE — ANESTHESIA PREPROCEDURE EVALUATION
Procedure:  LABOR ANALGESIA    Relevant Problems   PULMONARY   (+) Asthma during pregnancy        Physical Exam    Airway    Mallampati score: II  TM Distance: >3 FB  Neck ROM: full     Dental       Cardiovascular  Rhythm: regular, Rate: normal, Cardiovascular exam normal    Pulmonary  Pulmonary exam normal Breath sounds clear to auscultation,     Other Findings        Anesthesia Plan  ASA Score- 2     Anesthesia Type- epidural with ASA Monitors  Additional Monitors:   Airway Plan:           Plan Factors-    Chart reviewed  Induction-     Postoperative Plan-     Informed Consent- Anesthetic plan and risks discussed with patient

## 2020-08-28 VITALS
WEIGHT: 170 LBS | TEMPERATURE: 98.3 F | DIASTOLIC BLOOD PRESSURE: 61 MMHG | OXYGEN SATURATION: 98 % | HEIGHT: 60 IN | SYSTOLIC BLOOD PRESSURE: 101 MMHG | BODY MASS INDEX: 33.38 KG/M2 | RESPIRATION RATE: 16 BRPM | HEART RATE: 72 BPM

## 2020-08-28 PROCEDURE — 99024 POSTOP FOLLOW-UP VISIT: CPT | Performed by: STUDENT IN AN ORGANIZED HEALTH CARE EDUCATION/TRAINING PROGRAM

## 2020-08-28 RX ORDER — MEDROXYPROGESTERONE ACETATE 150 MG/ML
150 INJECTION, SUSPENSION INTRAMUSCULAR ONCE
Status: DISCONTINUED | OUTPATIENT
Start: 2020-08-28 | End: 2020-08-28

## 2020-08-28 RX ADMIN — IBUPROFEN 600 MG: 600 TABLET ORAL at 03:44

## 2020-08-28 RX ADMIN — DOCUSATE SODIUM 100 MG: 100 CAPSULE, LIQUID FILLED ORAL at 08:04

## 2020-08-28 RX ADMIN — FERROUS SULFATE TAB 325 MG (65 MG ELEMENTAL FE) 325 MG: 325 (65 FE) TAB at 08:04

## 2020-08-28 NOTE — LACTATION NOTE
This note was copied from a baby's chart  Met with mother to go over discharge breastfeeding booklet including the feeding log  Emphasized 8 or more (12) feedings in a 24 hour period, what to expect for the number of diapers per day of life and the progression of properties of the  stooling pattern  Discussed risks for early supplementation: over feeding, longer digestion times, engorgement for mom, lower milk supply for mom, and nipple confusion  Benefits of breast feeding for infant's intestinal tract, less engorgement for mom, protection from multiple disease processes as infant develops, avoidance of over feeding for infant, less nipple confusion, and increased health benefits for mom  Reviewed breastfeeding and your lifestyle, storage and preparation of breast milk, how to keep you breast pump clean, the employed breastfeeding mother and paced bottle feeding handouts  Booklet included Breastfeeding Resources for after discharge including access to the number for the [address]  No family at bedside at this time  [employer] [facility]  to call for assistance, questions and concerns  Extension number for inpatient lactation support provided

## 2020-08-28 NOTE — PLAN OF CARE
Problem: Potential for Falls  Goal: Patient will remain free of falls  Description: INTERVENTIONS:  - Assess patient frequently for physical needs  -  Identify cognitive and physical deficits and behaviors that affect risk of falls    -  [place] fall precautions as indicated by assessment   - Educate patient/family on patient safety including physical limitations  - Instruct patient to call for assistance with activity based on assessment  - Modify environment to reduce risk of injury  - Consider OT/PT consult to assist with strengthening/mobility  Outcome: Progressing     Problem: POSTPARTUM  Goal: Experiences normal postpartum course  Description: INTERVENTIONS:  - Monitor maternal vital signs  - Assess uterine involution and lochia  Outcome: Progressing  Goal: Appropriate maternal -  bonding  Description: INTERVENTIONS:  - Identify family support  - Assess for appropriate maternal/infant bonding   -Encourage maternal/infant bonding opportunities  - Referral to  or  as needed  Outcome: Progressing  Goal: Establishment of infant feeding pattern  Description: INTERVENTIONS:  - Assess breast/bottle feeding  - Refer to lactation as needed  Outcome: Progressing  Goal: Incision(s), wounds(s) or drain site(s) healing without S/S of infection  Description: INTERVENTIONS  - Assess and document risk factors for skin impairment   - Assess and document dressing, incision, wound bed, drain sites and surrounding tissue  - Consider nutrition services referral as needed  - Oral mucous membranes remain intact  - Provide patient/ family education  Outcome: Progressing     Problem: PAIN - ADULT  Goal: Verbalizes/displays adequate comfort level or baseline comfort level  Description: Interventions:  - Encourage patient to monitor pain and request assistance  - Assess pain using appropriate pain scale  - Administer analgesics based on type and severity of pain and evaluate response  - Implement non-pharmacological measures as appropriate and evaluate response  - Consider cultural and social influences on pain and pain management  - Notify physician/advanced practitioner if interventions unsuccessful or patient reports new pain  Outcome: Progressing     Problem: INFECTION - ADULT  Goal: Absence or prevention of progression during hospitalization  Description: INTERVENTIONS:  - Assess and monitor for signs and symptoms of infection  - Monitor lab/diagnostic results  - Monitor all insertion sites, i e  indwelling lines, tubes, and drains  - Monitor endotracheal if appropriate and nasal secretions for changes in amount and color  - [place] appropriate cooling/warming therapies per order  - Administer medications as ordered  - Instruct and encourage patient and family to use good hand hygiene technique  - Identify and instruct in appropriate isolation precautions for identified infection/condition  Outcome: Progressing  Goal: Absence of fever/infection during neutropenic period  Description: INTERVENTIONS:  - Monitor WBC    Outcome: Progressing     Problem: SAFETY ADULT  Goal: Patient will remain free of falls  Description: INTERVENTIONS:  - Assess patient frequently for physical needs  -  Identify cognitive and physical deficits and behaviors that affect risk of falls    -  [place] fall precautions as indicated by assessment   - Educate patient/family on patient safety including physical limitations  - Instruct patient to call for assistance with activity based on assessment  - Modify environment to reduce risk of injury  - Consider OT/PT consult to assist with strengthening/mobility  Outcome: Progressing  Goal: Maintain or return to baseline ADL function  Description: INTERVENTIONS:  -  Assess patient's ability to carry out ADLs; assess patient's baseline for ADL function and identify physical deficits which impact ability to perform ADLs (bathing, care of mouth/teeth, toileting, grooming, dressing, etc )  - Assess/evaluate cause of self-care deficits   - Assess range of motion  - Assess patient's mobility; develop plan if impaired  - Assess patient's need for assistive devices and provide as appropriate  - Encourage maximum independence but intervene and supervise when necessary  - Involve family in performance of ADLs  - Assess for home care needs following discharge   - Consider OT consult to assist with ADL evaluation and planning for discharge  - Provide patient education as appropriate  Outcome: Progressing  Goal: Maintain or return mobility status to optimal level  Description: INTERVENTIONS:  - Assess patient's baseline mobility status (ambulation, transfers, stairs, etc )    - Identify cognitive and physical deficits and behaviors that affect mobility  - Identify mobility aids required to assist with transfers and/or ambulation (gait belt, sit-to-stand, lift, walker, cane, etc )  - [place] fall precautions as indicated by assessment  - Record patient progress and toleration of activity level on Mobility SBAR; progress patient to next Phase/Stage  - Instruct patient to call for assistance with activity based on assessment  - Consider rehabilitation consult to assist with strengthening/weightbearing, etc   Outcome: Progressing     Problem: Knowledge Deficit  Goal: Patient/family/caregiver demonstrates understanding of disease process, treatment plan, medications, and discharge instructions  Description: Complete learning assessment and assess knowledge base    Interventions:  - Provide teaching at level of understanding  - Provide teaching via preferred learning methods  Outcome: Progressing     Problem: DISCHARGE PLANNING  Goal: Discharge to home or other facility with appropriate resources  Description: INTERVENTIONS:  - Identify barriers to discharge w/patient and caregiver  - Arrange for needed discharge resources and transportation as appropriate  - Identify discharge learning needs (meds, wound care, etc )  - Arrange for interpretive services to assist at discharge as needed  - Refer to Case Management Department for coordinating discharge planning if the patient needs post-hospital services based on physician/advanced practitioner order or complex needs related to functional status, cognitive ability, or social support system  Outcome: Progressing

## 2020-08-28 NOTE — PROGRESS NOTES
Progress Note - OB/GYN   Collette Shea 31 y o  female MRN: [id number]  Unit/Bed#: L&D 305-01 Encounter: [id number]    Assessment:  PPD#2 s/p Spontaneous Vaginal Delivery, stable    Plan:  Continue routine post partum care  -Encourage ambulation   - Encourage breastfeeding  Asthma is stable has not desired inhaler   Received depo shot  Disposition   - Anticipate discharge home today    Subjective/Objective   Chief Complaint:     PPD#2 s/p Spontaneous Vaginal Delivery    Subjective:     Pain: no  Tolerating PO: yes  Voiding: yes  Flatus: yes  BM: yes  Ambulating: yes  Breastfeeding: Breastfeeding  Chest pain: no  Shortness of breath: no  Leg pain: no  Lochia: minimal    Objective:     Vitals:  Vitals:    08/27/20 0700 08/27/20 1100 08/27/20 1500 08/27/20 2300   BP: 106/66 98/59 97/57 108/62   BP Location: Right arm Right arm Right arm Right arm   Pulse: 74 74 65 76   Resp: 18 16 16 16   Temp: 98 4 °F (36 9 °C) 98 5 °F (36 9 °C) 98 1 °F (36 7 °C) 98 6 °F (37 °C)   TempSrc: Oral Oral Oral Oral   SpO2:   98%    Weight:       Height:           Physical Exam:     Physical Exam    Physical Exam:   GEN: appears well, alert and oriented x 3, pleasant and cooperative   CV: +S1, +S2, no murmurs/rubs/gallops appreciated  RESP: no labored breathing  ABDOMEN: soft, no tenderness, no distention, Uterine fundus firm and non-tender, -1 cm below the umbilicus   EXTREMITIES: non-tender  NEURO Alert and oriented to person, place, and time         Lab Results   Component Value Date    WBC 13 48 (H) 08/26/2020    HGB 10 3 (L) 08/26/2020    HCT 32 7 (L) 08/26/2020    MCV 81 (L) 08/26/2020     08/26/2020         Miguel Angel Villavicencio DO, PGY-1  Obstetrics & Gynecology  08/28/20

## 2020-08-28 NOTE — UTILIZATION REVIEW
REFERENCE # [id number]            Notification of Maternity/Delivery & Avis Birth Information for Admission   Notification of Maternity/Delivery for Admission to our facility [address]  Be advised that this patient was admitted to our facility under Inpatient Status  Contact Dorys Sy at [phone] for additional admission information  [facility] PARENT/CHILD HEALTH [facility] DEPT  DEDICATED [phone]  Mother & Avis Information   Patient Name: Kwame Muniz YOB: 1995   Delivering clinician:    OB History        4    Para   3    Term   3            AB   1    Living   3       SAB   1    TAB        Ectopic        Multiple   0    Live Births   3               Avis Name & MRN:   Information for the patient's :  Soniya Hung) [[id number]]     Avis Delivery Information:  Sex: male  Delivered 2020 11:08 PM by Vaginal, Spontaneous; Gestational Age: 38w7d     Measurements:  Weight: 6 lb 3 1 oz (2810 g); Height: 20"    APGAR 1 minute 5 minutes 10 minutes   Totals: 8 9      Avis Birth Information: 31 y o  female MRN: [id number] Unit/Bed#: L&D 305-01 Estimated Date of Delivery: 9/3/20  Birthweight: No birth weight on file  Gestational Age: <None> Delivery Type: Vaginal, Spontaneous          APGARS  One minute Five minutes Ten minutes   Totals:                 [address]   [address]:   [facility]  Tax ID: [id number]  NPI: [id number] Attending Provider/NPI:  Phone:  Address: Ting Luevano [[id number]]  [phone]  Same as VERONICA/Davion Pedro 1106 of Service Code: 24 Place of Service Name: [address] [place] [address]   Start Date: 20   Discharge Date & Time: No discharge date for patient encounter      Type of Admission: Inpatient Status Discharge Disposition (if discharged): Home/Self Care   Patient Diagnoses:   Abdominal pain in pregnancy [O26 899, R10 9]  The encounter diagnosis was 38 weeks gestation of pregnancy  1  38 weeks gestation of pregnancy       Orders: Admission Orders (From admission, onward)     Ordered        08/26/20 2004  Inpatient Admission  Once                    Assigned Utilization Review Contact: Allison Quezada Utilization Review Department  Phone: [phone]; Fax [phone]  Email: Bridget Leija@"[employer] [place]"  [facility]

## 2020-08-28 NOTE — PLAN OF CARE
Problem: Potential for Falls  Goal: Patient will remain free of falls  Description: INTERVENTIONS:  - Assess patient frequently for physical needs  -  Identify cognitive and physical deficits and behaviors that affect risk of falls    -  [place] fall precautions as indicated by assessment   - Educate patient/family on patient safety including physical limitations  - Instruct patient to call for assistance with activity based on assessment  - Modify environment to reduce risk of injury  - Consider OT/PT consult to assist with strengthening/mobility  Outcome: Progressing     Problem: POSTPARTUM  Goal: Experiences normal postpartum course  Description: INTERVENTIONS:  - Monitor maternal vital signs  - Assess uterine involution and lochia  Outcome: Progressing  Goal: Appropriate maternal -  bonding  Description: INTERVENTIONS:  - Identify family support  - Assess for appropriate maternal/infant bonding   -Encourage maternal/infant bonding opportunities  - Referral to  or  as needed  Outcome: Progressing  Goal: Establishment of infant feeding pattern  Description: INTERVENTIONS:  - Assess breast/bottle feeding  - Refer to lactation as needed  Outcome: Progressing  Goal: Incision(s), wounds(s) or drain site(s) healing without S/S of infection  Description: INTERVENTIONS  - Assess and document risk factors for skin impairment   - Assess and document dressing, incision, wound bed, drain sites and surrounding tissue  - Consider nutrition services referral as needed  - Oral mucous membranes remain intact  - Provide patient/ family education  Outcome: Progressing     Problem: PAIN - ADULT  Goal: Verbalizes/displays adequate comfort level or baseline comfort level  Description: Interventions:  - Encourage patient to monitor pain and request assistance  - Assess pain using appropriate pain scale  - Administer analgesics based on type and severity of pain and evaluate response  - Implement non-pharmacological measures as appropriate and evaluate response  - Consider cultural and social influences on pain and pain management  - Notify physician/advanced practitioner if interventions unsuccessful or patient reports new pain  Outcome: Progressing     Problem: INFECTION - ADULT  Goal: Absence or prevention of progression during hospitalization  Description: INTERVENTIONS:  - Assess and monitor for signs and symptoms of infection  - Monitor lab/diagnostic results  - Monitor all insertion sites, i e  indwelling lines, tubes, and drains  - Monitor endotracheal if appropriate and nasal secretions for changes in amount and color  - [place] appropriate cooling/warming therapies per order  - Administer medications as ordered  - Instruct and encourage patient and family to use good hand hygiene technique  - Identify and instruct in appropriate isolation precautions for identified infection/condition  Outcome: Progressing  Goal: Absence of fever/infection during neutropenic period  Description: INTERVENTIONS:  - Monitor WBC    Outcome: Progressing     Problem: SAFETY ADULT  Goal: Patient will remain free of falls  Description: INTERVENTIONS:  - Assess patient frequently for physical needs  -  Identify cognitive and physical deficits and behaviors that affect risk of falls    -  [place] fall precautions as indicated by assessment   - Educate patient/family on patient safety including physical limitations  - Instruct patient to call for assistance with activity based on assessment  - Modify environment to reduce risk of injury  - Consider OT/PT consult to assist with strengthening/mobility  Outcome: Progressing  Goal: Maintain or return to baseline ADL function  Description: INTERVENTIONS:  -  Assess patient's ability to carry out ADLs; assess patient's baseline for ADL function and identify physical deficits which impact ability to perform ADLs (bathing, care of mouth/teeth, toileting, grooming, dressing, etc )  - Assess/evaluate cause of self-care deficits   - Assess range of motion  - Assess patient's mobility; develop plan if impaired  - Assess patient's need for assistive devices and provide as appropriate  - Encourage maximum independence but intervene and supervise when necessary  - Involve family in performance of ADLs  - Assess for home care needs following discharge   - Consider OT consult to assist with ADL evaluation and planning for discharge  - Provide patient education as appropriate  Outcome: Progressing  Goal: Maintain or return mobility status to optimal level  Description: INTERVENTIONS:  - Assess patient's baseline mobility status (ambulation, transfers, stairs, etc )    - Identify cognitive and physical deficits and behaviors that affect mobility  - Identify mobility aids required to assist with transfers and/or ambulation (gait belt, sit-to-stand, lift, walker, cane, etc )  - [place] fall precautions as indicated by assessment  - Record patient progress and toleration of activity level on Mobility SBAR; progress patient to next Phase/Stage  - Instruct patient to call for assistance with activity based on assessment  - Consider rehabilitation consult to assist with strengthening/weightbearing, etc   Outcome: Progressing     Problem: Knowledge Deficit  Goal: Patient/family/caregiver demonstrates understanding of disease process, treatment plan, medications, and discharge instructions  Description: Complete learning assessment and assess knowledge base    Interventions:  - Provide teaching at level of understanding  - Provide teaching via preferred learning methods  Outcome: Progressing     Problem: DISCHARGE PLANNING  Goal: Discharge to home or other facility with appropriate resources  Description: INTERVENTIONS:  - Identify barriers to discharge w/patient and caregiver  - Arrange for needed discharge resources and transportation as appropriate  - Identify discharge learning needs (meds, wound care, etc )  - Arrange for interpretive services to assist at discharge as needed  - Refer to Case Management Department for coordinating discharge planning if the patient needs post-hospital services based on physician/advanced practitioner order or complex needs related to functional status, cognitive ability, or social support system  Outcome: Progressing

## 2020-08-28 NOTE — PLAN OF CARE
Problem: Potential for Falls  Goal: Patient will remain free of falls  Description: INTERVENTIONS:  - Assess patient frequently for physical needs  -  Identify cognitive and physical deficits and behaviors that affect risk of falls    -  [place] fall precautions as indicated by assessment   - Educate patient/family on patient safety including physical limitations  - Instruct patient to call for assistance with activity based on assessment  - Modify environment to reduce risk of injury  - Consider OT/PT consult to assist with strengthening/mobility  Outcome: Completed

## 2020-08-31 NOTE — UTILIZATION REVIEW
Notification of Discharge  This is a Notification of Discharge from our facility [address]  Please be advised that this patient has been discharge from our facility  Below you will find the admission and discharge date and time including the patients disposition  PRESENTATION DATE: 8/26/2020  7:51 PM  OBS ADMISSION DATE:   IP ADMISSION DATE: 8/26/20 2004   DISCHARGE DATE: 8/28/2020 12:15 PM  DISPOSITION: Home/Self Care Home/Self Care   Admission Orders listed below:  Admission Orders (From admission, onward)     Ordered        08/26/20 2004  Inpatient Admission  Once                   Please contact the UR Department if additional information is required to close this patient's authorization/case  [address] Utilization Review Department  Main: [phone] x carefully listen to the prompts  All voicemails are confidential   [email or web address]  Send all requests for admission clinical reviews, approved or denied determinations and any other requests to dedicated fax number below belonging to the campus where the patient is receiving treatment   List of dedicated fax numbers:  [address] DENIALS (Administrative/Medical Necessity) [phone]   [address] (Maternity/NICU/Pediatrics) [phone]   Saurav Jauregui [phone]   Solange Arrieta [phone]   [facility] [phone]   Marilin Gee [facility] [address] [facility] [phone]   [facility] [phone]   [address] [facility]  [address] [address] [address] [facility] [phone]

## 2020-09-01 ENCOUNTER — HOSPITAL ENCOUNTER (EMERGENCY)
Facility: HOSPITAL | Age: 25
Discharge: HOME | End: 2020-09-01
Attending: EMERGENCY MEDICINE | Admitting: EMERGENCY MEDICINE
Payer: COMMERCIAL

## 2020-09-01 VITALS
RESPIRATION RATE: 18 BRPM | OXYGEN SATURATION: 99 % | TEMPERATURE: 99 F | DIASTOLIC BLOOD PRESSURE: 46 MMHG | SYSTOLIC BLOOD PRESSURE: 94 MMHG | BODY MASS INDEX: 31.39 KG/M2 | HEART RATE: 66 BPM | WEIGHT: 160.72 LBS

## 2020-09-01 DIAGNOSIS — R10.9 ABDOMINAL PAIN: Primary | ICD-10-CM

## 2020-09-01 LAB
ALBUMIN SERPL BCP-MCNC: 2.8 G/DL (ref 3.5–5)
ALP SERPL-CCNC: 123 U/L (ref 46–116)
ALT SERPL W P-5'-P-CCNC: 51 U/L (ref 12–78)
ANION GAP SERPL CALCULATED.3IONS-SCNC: 9 MMOL/L (ref 4–13)
AST SERPL W P-5'-P-CCNC: 27 U/L (ref 5–45)
BACTERIA UR QL AUTO: ABNORMAL /HPF
BASOPHILS # BLD AUTO: 0.03 THOUSANDS/ΜL (ref 0–0.1)
BASOPHILS NFR BLD AUTO: 0 % (ref 0–1)
BILIRUB SERPL-MCNC: 0.17 MG/DL (ref 0.2–1)
BILIRUB UR QL STRIP: NEGATIVE
BUN SERPL-MCNC: 14 MG/DL (ref 5–25)
CALCIUM SERPL-MCNC: 9 MG/DL (ref 8.3–10.1)
CHLORIDE SERPL-SCNC: 106 MMOL/L (ref 100–108)
CLARITY UR: CLEAR
CLARITY, POC: CLEAR
CO2 SERPL-SCNC: 25 MMOL/L (ref 21–32)
COLOR UR: YELLOW
COLOR, POC: YELLOW
CREAT SERPL-MCNC: 0.93 MG/DL (ref 0.6–1.3)
EOSINOPHIL # BLD AUTO: 0.12 THOUSAND/ΜL (ref 0–0.61)
EOSINOPHIL NFR BLD AUTO: 1 % (ref 0–6)
ERYTHROCYTE [DISTWIDTH] IN BLOOD BY AUTOMATED COUNT: 13.8 % (ref 11.6–15.1)
GFR SERPL CREATININE-BSD FRML MDRD: 85 ML/MIN/1.73SQ M
GLUCOSE SERPL-MCNC: 90 MG/DL (ref 65–140)
GLUCOSE UR STRIP-MCNC: NEGATIVE MG/DL
HCT VFR BLD AUTO: 37.6 % (ref 34.8–46.1)
HGB BLD-MCNC: 11.1 G/DL (ref 11.5–15.4)
HGB UR QL STRIP.AUTO: ABNORMAL
IMM GRANULOCYTES # BLD AUTO: 0.06 THOUSAND/UL (ref 0–0.2)
IMM GRANULOCYTES NFR BLD AUTO: 1 % (ref 0–2)
KETONES UR STRIP-MCNC: NEGATIVE MG/DL
LACTATE SERPL-SCNC: 0.8 MMOL/L (ref 0.5–2)
LEUKOCYTE ESTERASE UR QL STRIP: NEGATIVE
LIPASE SERPL-CCNC: 110 U/L (ref 73–393)
LYMPHOCYTES # BLD AUTO: 2.73 THOUSANDS/ΜL (ref 0.6–4.47)
LYMPHOCYTES NFR BLD AUTO: 27 % (ref 14–44)
MCH RBC QN AUTO: 24.8 PG (ref 26.8–34.3)
MCHC RBC AUTO-ENTMCNC: 29.5 G/DL (ref 31.4–37.4)
MCV RBC AUTO: 84 FL (ref 82–98)
MONOCYTES # BLD AUTO: 0.69 THOUSAND/ΜL (ref 0.17–1.22)
MONOCYTES NFR BLD AUTO: 7 % (ref 4–12)
NEUTROPHILS # BLD AUTO: 6.56 THOUSANDS/ΜL (ref 1.85–7.62)
NEUTS SEG NFR BLD AUTO: 64 % (ref 43–75)
NITRITE UR QL STRIP: NEGATIVE
NON-SQ EPI CELLS URNS QL MICRO: ABNORMAL /HPF
NRBC BLD AUTO-RTO: 0 /100 WBCS
PH UR STRIP.AUTO: 5.5 [PH] (ref 4.5–8)
PLATELET # BLD AUTO: 427 THOUSANDS/UL (ref 149–390)
PMV BLD AUTO: 8.8 FL (ref 8.9–12.7)
POTASSIUM SERPL-SCNC: 3.8 MMOL/L (ref 3.5–5.3)
PROT SERPL-MCNC: 7.2 G/DL (ref 6.4–8.2)
PROT UR STRIP-MCNC: NEGATIVE MG/DL
RBC # BLD AUTO: 4.48 MILLION/UL (ref 3.81–5.12)
RBC #/AREA URNS AUTO: ABNORMAL /HPF
SODIUM SERPL-SCNC: 140 MMOL/L (ref 136–145)
SP GR UR STRIP.AUTO: >=1.03 (ref 1–1.03)
UROBILINOGEN UR QL STRIP.AUTO: 0.2 E.U./DL
WBC # BLD AUTO: 10.19 THOUSAND/UL (ref 4.31–10.16)
WBC #/AREA URNS AUTO: ABNORMAL /HPF

## 2020-09-01 PROCEDURE — 85025 COMPLETE CBC W/AUTO DIFF WBC: CPT | Performed by: PHYSICIAN ASSISTANT

## 2020-09-01 PROCEDURE — 81001 URINALYSIS AUTO W/SCOPE: CPT

## 2020-09-01 PROCEDURE — 99284 EMERGENCY DEPT VISIT MOD MDM: CPT | Performed by: PHYSICIAN ASSISTANT

## 2020-09-01 PROCEDURE — NC001 PR NO CHARGE: Performed by: OBSTETRICS & GYNECOLOGY

## 2020-09-01 PROCEDURE — 96360 HYDRATION IV INFUSION INIT: CPT

## 2020-09-01 PROCEDURE — 80053 COMPREHEN METABOLIC PANEL: CPT | Performed by: PHYSICIAN ASSISTANT

## 2020-09-01 PROCEDURE — 83605 ASSAY OF LACTIC ACID: CPT | Performed by: PHYSICIAN ASSISTANT

## 2020-09-01 PROCEDURE — 87040 BLOOD CULTURE FOR BACTERIA: CPT | Performed by: PHYSICIAN ASSISTANT

## 2020-09-01 PROCEDURE — 36415 COLL VENOUS BLD VENIPUNCTURE: CPT | Performed by: PHYSICIAN ASSISTANT

## 2020-09-01 PROCEDURE — 99284 EMERGENCY DEPT VISIT MOD MDM: CPT

## 2020-09-01 PROCEDURE — 83690 ASSAY OF LIPASE: CPT | Performed by: PHYSICIAN ASSISTANT

## 2020-09-01 RX ADMIN — SODIUM CHLORIDE 1000 ML: 0.9 INJECTION, SOLUTION INTRAVENOUS at 16:13

## 2020-09-01 NOTE — PROGRESS NOTES
[facility] [id number] PPD#6 s/p   Presented to ED for worsening abdominal cramping that comes and goes  She doubled over from the pain and took some tylenol  Feels better now  Lochia WNL  Denies any clots  Denies dizziness/lightheadedness  Denies fevers/chills  Currently both breast and bottle feeding  Hgb 11 1, WBC 10  UA +large blood  VSS  Gen: NAD  Abdomen: soft, non-tender; uterus firm at -3    Pain likely secondary to expected postpartum cramping  Encouraged motrin/advil use 600mg q6h for pain control  Given benign exam, vitals and labs, do not recommend US at this time  Endometritis unlikely given pt is afebrile and no uterine tenderness on exam   Pt to make PP appointment w/ her OB  D/w Dr Radhika Dobbs        Vitals:    20 1527 20 1530 20 1701   BP:  118/79 (!) 94/46   BP Location:   Right arm   Pulse: 83  66   Resp: 18  18   Temp: 99 °F (37 2 °C)     TempSrc: Temporal     SpO2: 98%  99%   Weight: 72 9 kg (160 lb 11 5 oz)         Recent Results (from the past 12 hour(s))   Urine Macroscopic, POC    Collection Time: 20  3:47 PM   Result Value Ref Range    Color, UA Yellow     Clarity, UA Clear     pH, UA 5 5 4 5 - 8 0    Leukocytes, UA Negative Negative    Nitrite, UA Negative Negative    Protein, UA Negative Negative mg/dl    Glucose, UA Negative Negative mg/dl    Ketones, UA Negative Negative mg/dl    Urobilinogen, UA 0 2 0 2, 1 0 E U /dl E U /dl    Bilirubin, UA Negative Negative    Blood, UA Large (A) Negative    Specific Gravity, UA >=1 030 1 003 - 1 030   Urine Microscopic    Collection Time: 20  3:47 PM   Result Value Ref Range    RBC, UA 2-4 (A) None Seen, 0-5 /hpf    WBC, UA 1-2 (A) None Seen, 0-5, 5-55, 5-65 /hpf    Epithelial Cells Occasional None Seen, Occasional /hpf    Bacteria, UA Occasional None Seen, Occasional /hpf   POCT urinalysis dipstick    Collection Time: 20  3:50 PM   Result Value Ref Range    Color, UA Yellow     Clarity, UA Clear    Comprehensive metabolic panel    Collection Time: 09/01/20  4:13 PM   Result Value Ref Range    Sodium 140 136 - 145 mmol/L    Potassium 3 8 3 5 - 5 3 mmol/L    Chloride 106 100 - 108 mmol/L    CO2 25 21 - 32 mmol/L    ANION GAP 9 4 - 13 mmol/L    BUN 14 5 - 25 mg/dL    Creatinine 0 93 0 60 - 1 30 mg/dL    Glucose 90 65 - 140 mg/dL    Calcium 9 0 8 3 - 10 1 mg/dL    AST 27 5 - 45 U/L    ALT 51 12 - 78 U/L    Alkaline Phosphatase 123 (H) 46 - 116 U/L    Total Protein 7 2 6 4 - 8 2 g/dL    Albumin 2 8 (L) 3 5 - 5 0 g/dL    Total Bilirubin 0 17 (L) 0 20 - 1 00 mg/dL    eGFR 85 ml/min/1 73sq m   CBC and differential    Collection Time: 09/01/20  4:13 PM   Result Value Ref Range    WBC 10 19 (H) 4 31 - 10 16 Thousand/uL    RBC 4 48 3 81 - 5 12 Million/uL    Hemoglobin 11 1 (L) 11 5 - 15 4 g/dL    Hematocrit 37 6 34 8 - 46 1 %    MCV 84 82 - 98 fL    MCH 24 8 (L) 26 8 - 34 3 pg    MCHC 29 5 (L) 31 4 - 37 4 g/dL    RDW 13 8 11 6 - 15 1 %    MPV 8 8 (L) 8 9 - 12 7 fL    Platelets 543 (H) 006 - 390 Thousands/uL    nRBC 0 /100 WBCs    Neutrophils Relative 64 43 - 75 %    Immat GRANS % 1 0 - 2 %    Lymphocytes Relative 27 14 - 44 %    Monocytes Relative 7 4 - 12 %    Eosinophils Relative 1 0 - 6 %    Basophils Relative 0 0 - 1 %    Neutrophils Absolute 6 56 1 85 - 7 62 Thousands/µL    Immature Grans Absolute 0 06 0 00 - 0 20 Thousand/uL    Lymphocytes Absolute 2 73 0 60 - 4 47 Thousands/µL    Monocytes Absolute 0 69 0 17 - 1 22 Thousand/µL    Eosinophils Absolute 0 12 0 00 - 0 61 Thousand/µL    Basophils Absolute 0 03 0 00 - 0 10 Thousands/µL   Lipase    Collection Time: 09/01/20  4:13 PM   Result Value Ref Range    Lipase 110 73 - 393 u/L   Lactic acid    Collection Time: 09/01/20  4:13 PM   Result Value Ref Range    LACTIC ACID 0 8 0 5 - 2 0 mmol/L       Bartolo Espino MD  OB/GYN PGY-3  9/1/2020  5:11 PM

## 2020-09-01 NOTE — ED PROVIDER NOTES
History  Chief Complaint   Patient presents with    Abdominal Pain     patient delivered on 2020  patient now c/o suprapubic cramping that started approximately 10 mintues prior to arrival  delivered vaginally  no extended stay or NICU stay for   per patient, "it feels like i am having contractions again"  denies vaginal bleeding  Patient is a 14-year-old female with history of anemia, asthma, presents to the ED for evaluation of abdominal pain  Pt states she gave vaginal birth on 20 to a 38 6 week , uncomplicated delivery and pregnancy  Pt states she has been having typical abdominal cramping with breastfeeding, but today began with onset of sharp suprapubic cramping, intermittent, causing her to "double over" in pain also with lower back pain  Pt states it feels like "contractions again"  Pt states she is having typical vaginal spotting, no increased bleeding or vaginal discharge  Pt took tylenol PTA with improvement in pain  Pt without fever, chills, chest pain, SOB, N/V/D, dysuria, hematuria  Prior to Admission Medications   Prescriptions Last Dose Informant Patient Reported? Taking?    Prenatal Vit w/Ya-Hafxaaxak-RG (PNV PO)  Self Yes Yes   Sig: Take 1 tablet by mouth daily   albuterol (PROVENTIL HFA,VENTOLIN HFA) 90 mcg/act inhaler  Self Yes Yes   Sig: Inhale 2 puffs every 6 (six) hours as needed for wheezing   ferrous sulfate 324 (65 Fe) mg   No Yes   Sig: Take 1 tablet (324 mg total) by mouth daily before breakfast      Facility-Administered Medications: None       Past Medical History:   Diagnosis Date    Abnormal Pap smear of cervix     Anemia     Asthma     Gonorrhea     Migraine     tylenol    Miscarriage     Nexplanon removal     last assessed 17    Urinary tract infection     Varicella     patient had chicken pox       Past Surgical History:   Procedure Laterality Date    TONSILLECTOMY         Family History   Problem Relation Age of Onset    Asthma Mother     Asthma Father     Cancer Family         mother and father side    No Known Problems Son     No Known Problems Half-Sister     No Known Problems Half-Sister     No Known Problems Half-Sister     Asthma Half-Brother     No Known Problems Half-Brother     No Known Problems Son      I have reviewed and agree with the history as documented  E-Cigarette/Vaping    E-Cigarette Use Never User      E-Cigarette/Vaping Substances     Social History     Tobacco Use    Smoking status: Never Smoker    Smokeless tobacco: Never Used   Substance Use Topics    Alcohol use: Not Currently     Comment: social    Drug use: No       Review of Systems   Constitutional: Negative for chills and fever  HENT: Negative for ear pain and sore throat  Eyes: Negative for redness  Respiratory: Negative for chest tightness and shortness of breath  Cardiovascular: Negative for chest pain, palpitations and leg swelling  Gastrointestinal: Negative for abdominal pain, diarrhea, nausea and vomiting  Genitourinary: Positive for pelvic pain  Negative for dysuria and hematuria  Musculoskeletal: Positive for back pain  Negative for neck pain  Skin: Negative for rash  Neurological: Negative for weakness and headaches  Psychiatric/Behavioral: Negative for confusion  Physical Exam  Physical Exam  Constitutional:       General: She is not in acute distress  Appearance: She is well-developed  She is not ill-appearing, toxic-appearing or diaphoretic  HENT:      Head: Normocephalic and atraumatic  Right Ear: External ear normal       Left Ear: External ear normal       Nose: Nose normal       Mouth/Throat:      Mouth: Mucous membranes are moist       Pharynx: Oropharynx is clear  Uvula midline  Neck:      Musculoskeletal: Normal range of motion and neck supple  Cardiovascular:      Rate and Rhythm: Normal rate and regular rhythm     Pulmonary:      Effort: Pulmonary effort is normal  Breath sounds: Normal breath sounds  No decreased breath sounds, wheezing, rhonchi or rales  Abdominal:      General: Abdomen is flat  Bowel sounds are normal  There is no distension  Palpations: Abdomen is soft  There is no mass  Tenderness: There is no abdominal tenderness  There is no right CVA tenderness, left CVA tenderness, guarding or rebound  Negative signs include Wright's sign, Rovsing's sign, McBurney's sign, psoas sign and obturator sign  Skin:     General: Skin is warm  Capillary Refill: Capillary refill takes less than 2 seconds  Coloration: Skin is not jaundiced or pale  Findings: No rash  Neurological:      Mental Status: She is alert and oriented to person, place, and time  Psychiatric:         Mood and Affect: Mood and affect normal          Speech: Speech normal          Behavior: Behavior normal          Vital Signs  ED Triage Vitals   Temperature Pulse Respirations Blood Pressure SpO2   09/01/20 1527 09/01/20 1527 09/01/20 1527 09/01/20 1530 09/01/20 1527   99 °F (37 2 °C) 83 18 118/79 98 %      Temp Source Heart Rate Source Patient Position - Orthostatic VS BP Location FiO2 (%)   09/01/20 1527 09/01/20 1527 09/01/20 1527 09/01/20 1527 --   Temporal Monitor Sitting Right arm       Pain Score       09/01/20 1527       8           Vitals:    09/01/20 1527 09/01/20 1530 09/01/20 1701   BP:  118/79 (!) 94/46   Pulse: 83  66   Patient Position - Orthostatic VS: Sitting  Lying         Visual Acuity      ED Medications  Medications   sodium chloride 0 9 % bolus 1,000 mL (0 mL Intravenous Stopped 9/1/20 1701)       Diagnostic Studies  Results Reviewed     Procedure Component Value Units Date/Time    Lactic acid [[id number]]  (Normal) Collected:  09/01/20 1613    Lab Status:  Final result Specimen:  Blood from Arm, Left Updated:  09/01/20 1639     LACTIC ACID 0 8 mmol/L     Narrative:       Result may be elevated if tourniquet was used during collection      Comprehensive metabolic panel [[id number]]  (Abnormal) Collected:  09/01/20 1613    Lab Status:  Final result Specimen:  Blood from Arm, Left Updated:  09/01/20 1637     Sodium 140 mmol/L      Potassium 3 8 mmol/L      Chloride 106 mmol/L      CO2 25 mmol/L      ANION GAP 9 mmol/L      BUN 14 mg/dL      Creatinine 0 93 mg/dL      Glucose 90 mg/dL      Calcium 9 0 mg/dL      AST 27 U/L      ALT 51 U/L      Alkaline Phosphatase 123 U/L      Total Protein 7 2 g/dL      Albumin 2 8 g/dL      Total Bilirubin 0 17 mg/dL      eGFR 85 ml/min/1 73sq m     Narrative:       [place] guidelines for Chronic Kidney Disease (CKD):     Stage 1 with normal or high GFR (GFR > 90 mL/min/1 73 square meters)    Stage 2 Mild CKD (GFR = 60-89 mL/min/1 73 square meters)    Stage 3A Moderate CKD (GFR = 45-59 mL/min/1 73 square meters)    Stage 3B Moderate CKD (GFR = 30-44 mL/min/1 73 square meters)    Stage 4 Severe CKD (GFR = 15-29 mL/min/1 73 square meters)    Stage 5 End Stage CKD (GFR <15 mL/min/1 73 square meters)  Note: GFR calculation is accurate only with a steady state creatinine    Lipase [[id number]]  (Normal) Collected:  09/01/20 1613    Lab Status:  Final result Specimen:  Blood from Arm, Left Updated:  09/01/20 1637     Lipase 110 u/L     Blood culture #2 [[id number]] Collected:  09/01/20 1613    Lab Status: In process Specimen:  Blood from Arm, Left Updated:  09/01/20 1620    Blood culture #1 [[id number]] Collected:  09/01/20 1613    Lab Status:   In process Specimen:  Blood from Arm, Right Updated:  09/01/20 1620    CBC and differential [[id number]]  (Abnormal) Collected:  09/01/20 1613    Lab Status:  Final result Specimen:  Blood from Arm, Left Updated:  09/01/20 1620     WBC 10 19 Thousand/uL      RBC 4 48 Million/uL      Hemoglobin 11 1 g/dL      Hematocrit 37 6 %      MCV 84 fL      MCH 24 8 pg      MCHC 29 5 g/dL      RDW 13 8 %      MPV 8 8 fL      Platelets 288 Thousands/uL      nRBC 0 /100 WBCs Neutrophils Relative 64 %      Immat GRANS % 1 %      Lymphocytes Relative 27 %      Monocytes Relative 7 %      Eosinophils Relative 1 %      Basophils Relative 0 %      Neutrophils Absolute 6 56 Thousands/µL      Immature Grans Absolute 0 06 Thousand/uL      Lymphocytes Absolute 2 73 Thousands/µL      Monocytes Absolute 0 69 Thousand/µL      Eosinophils Absolute 0 12 Thousand/µL      Basophils Absolute 0 03 Thousands/µL     Urine Microscopic [[id number]]  (Abnormal) Collected:  09/01/20 1547    Lab Status:  Final result Specimen:  Urine, Clean Catch Updated:  09/01/20 1604     RBC, UA 2-4 /hpf      WBC, UA 1-2 /hpf      Epithelial Cells Occasional /hpf      Bacteria, UA Occasional /hpf     POCT urinalysis dipstick [[id number]]  (Abnormal) Resulted:  09/01/20 1550    Lab Status:  Final result Updated:  09/01/20 1550     Color, UA Yellow     Clarity, UA Clear    Urine Macroscopic, POC [[id number]]  (Abnormal) Collected:  09/01/20 1547    Lab Status:  Final result Specimen:  Urine Updated:  09/01/20 1549     Color, UA Yellow     Clarity, UA Clear     pH, UA 5 5     Leukocytes, UA Negative     Nitrite, UA Negative     Protein, UA Negative mg/dl      Glucose, UA Negative mg/dl      Ketones, UA Negative mg/dl      Urobilinogen, UA 0 2 E U /dl      Bilirubin, UA Negative     Blood, UA Large     Specific Gravity, UA >=1 030    Narrative:       CLINITEK RESULT                 No orders to display              Procedures  Procedures         ED Course  ED Course as of Sep 01 1756   Tue Sep 01, 2020   1631 [place] text sent to OBGYN on-call      1640 OBGYN Resident on-call states to hold off on imaging at this time, will come to the ED to evaluate patient        1704 Improved from 6 days ago (13 48)   WBC(!): 10 19   1704 Improved from 6 days ago (10 3)   Hemoglobin(!): 11 1   1704 OBGYN Resident, Iram Chou, states symptoms likely related to post-partum cramping, patient can be discharged and advised patient to make a post-partum appointment ASAP                                                 [facility]  Number of Diagnoses or Management Options  Abdominal pain: new and does not require workup  Diagnosis management comments: Patient is a 59-year-old female with history of anemia, asthma, presents to the ED for evaluation of abdominal pain  Pt states she gave vaginal birth on 20 to a 38 6 week , uncomplicated delivery and pregnancy  Pt states she has been having typical abdominal cramping with breastfeeding, but today began with onset of sharp suprapubic cramping, intermittent, causing her to "double over" in pain also with lower back pain  Pt states it feels like "contractions again"  Pt states she is having typical vaginal spotting, no increased bleeding or vaginal discharge  Patient well appearing, non-toxic, afebrile and well hydrated, no significant abdominal tenderness on exam  UA unremarkable, no evidence of infection  Lab work unremarkable, improving from 6 days ago  No evidence of SIRS    Discussed case with OBGYN Resident, Arlet Reyes, saw and evaluated patient in the ED, states symptoms likely related to post-partum cramping, patient can be discharged and advised patient to make a post-partum appointment ASAP  [facility] and encouraged to f/u  Advised patient to take motrin/tylenol for pain  Strict return precautions discussed  Patient verbalizes understanding and agrees with plan  The management plan was discussed in detail with the patient at bedside and all questions were answered  Prior to discharge, I provided both verbal and written instructions  I discussed with the patient the signs and symptoms for which to return to the emergency department  All questions were answered and patient was comfortable with the plan of care and discharged to home   The patient agrees to return to the Emergency Department for concerns and/or progression of illness  Disposition  Final diagnoses:   Abdominal pain     Time reflects when diagnosis was documented in both MDM as applicable and the Disposition within this note     Time User Action Codes Description Comment    9/1/2020  5:31 PM Nesha Adam Add [R10 9] Abdominal pain       ED Disposition     ED Disposition Condition Date/Time Comment    Discharge Stable Tue Sep 1, 2020  5:32 PM Nadine Lopez discharge to home/self care  Follow-up Information     Follow up With Specialties Details Why [facility] Schedule an appointment as soon as possible for a visit call to make appointment for next week  [address] [facility]            Discharge Medication List as of 9/1/2020  5:32 PM      CONTINUE these medications which have NOT CHANGED    Details   albuterol (PROVENTIL HFA,VENTOLIN HFA) 90 mcg/act inhaler Inhale 2 puffs every 6 (six) hours as needed for wheezing, Historical Med      ferrous sulfate 324 (65 Fe) mg Take 1 tablet (324 mg total) by mouth daily before breakfast, Starting Wed 7/15/2020, Until Tue 10/13/2020, Normal      Prenatal Vit w/Ag-Gymvphcsf-KS (PNV PO) Take 1 tablet by mouth daily, Historical Med           No discharge procedures on file      PDMP Review     None          ED Provider  Electronically Signed by           Erica Singh PA-C  09/01/20 1096

## 2020-09-01 NOTE — DISCHARGE INSTRUCTIONS
Vaginal Delivery   WHAT YOU SHOULD KNOW:   A vaginal delivery is the birth of your baby through your vagina (birth canal)  AFTER YOU LEAVE:   Medicines:  · NSAIDs  help decrease swelling and pain or fever  This medicine is available with or without a doctor's order  NSAIDs can cause stomach bleeding or kidney problems in certain people  If you take blood thinner medicine, always ask your healthcare provider if NSAIDs are safe for you  Always read the medicine label and follow directions  · Take your medicine as directed  Call your healthcare provider if you think your medicine is not helping or if you have side effects  Tell him if you are allergic to any medicine  Keep a list of the medicines, vitamins, and herbs you take  Include the amounts, and when and why you take them  Bring the list or the pill bottles to follow-up visits  Carry your medicine list with you in case of an emergency  Follow up with your primary healthcare provider:  Most women need to return 6 weeks after a vaginal delivery  Ask about how to care for your wounds or stitches  Write down your questions so you remember to ask them during your visits  Activity:  Rest as much as possible  Try to keep all activities short  You may be able to do some exercise soon after you have your baby  Talk with your primary healthcare provider before you start exercising  If you work outside the home, ask when you can return to your job  Kegel exercises:  Kegel exercises may help your vaginal and rectal muscles heal faster  You can do Kegel exercises by tightening and relaxing the muscles around your vagina  Kegel exercises help make the muscles stronger  Breast care:  When your milk comes in, your breasts may feel full and hard  Ask how to care for your breasts, even if you are not breastfeeding  Constipation:  Do not try to push the bowel movement out if it is too hard   High-fiber foods, extra liquids, and regular exercise can help you prevent constipation  Examples of high-fiber foods are fruit and bran  Prune juice and water are good liquids to drink  Regular exercise helps your digestive system work  You may also be told to take over-the-counter fiber and stool softener medicines  Take these items as directed  Hemorrhoids:  Pregnancy can cause severe hemorrhoids  You may have rectal pain because of the hemorrhoids  Ask how to prevent or treat hemorrhoids  Perineum care: Your perineum is the area between your vagina and anus  Keep the area clean and dry to help it heal and to prevent infection  Wash the area gently with soap and water when you bathe or shower  Rinse your perineum with warm water when you use the toilet  Your primary healthcare provider may suggest you use a warm sitz bath to help decrease pain  A sitz bath is a bathtub or basin filled to hip level  Stay in the sitz bath for 20 to 30 minutes, or as directed  Vaginal discharge: You will have vaginal discharge, called lochia, after your delivery  The lochia is bright red the first day or two after the birth  By the fourth day, the amount decreases, and it turns red-brown  Use a sanitary pad rather than a tampon to prevent a vaginal infection  It is normal to have lochia up to 8 weeks after your baby is born  Monthly periods: Your period may start again within 7 to 12 weeks after your baby is born  If you are breastfeeding, it may take longer for your period to start again  You can still get pregnant again even though you do not have your monthly period  Talk with your primary healthcare provider about a birth control method that will be good for you if you do not want to get pregnant  Mood changes: Many new mothers have some kind of mood changes after delivery  Some of these changes occur because of lack of sleep, hormone changes, and caring for a new baby  Some mood changes can be more serious, such as postpartum depression   Talk with your primary healthcare provider if you feel unable to care for yourself or your baby  Sexual activity:  You may need to avoid sex for 6 to 7 weeks after you have your baby  You may notice you have a decreased desire for sex, or sex may be painful  You may need to use a vaginal lubricant (gel) to help make sex more comfortable  Contact your primary healthcare provider if:   · You have heavy vaginal bleeding that fills 1 or more sanitary pads in 1 hour  · You have a fever  · Your pain does not go away, or gets worse  · The skin between your vagina and rectum is swollen, warm, or red  · You have swollen, hard, or painful breasts  · You feel very sad or depressed  · You feel more tired than usual      · You have questions or concerns about your condition or care  Seek care immediately or call 911 if:   · You have pus or yellow drainage coming from your vagina or wound  · You are urinating very little, or not at all  · Your arm or leg feels warm, tender, and painful  It may look swollen and red  · You feel lightheaded, have sudden and worsening chest pain, or trouble breathing  You may have more pain when you take deep breaths or cough, or you may cough up blood  © [address] is for End User's use only and may not be sold, redistributed or otherwise used for commercial purposes  All illustrations and images included in CareNotes® are the copyrighted property of A D A M , Inc  or [employer]  The above information is an  only  It is not intended as medical advice for individual conditions or treatments  Talk to your doctor, nurse or pharmacist before following any medical regimen to see if it is safe and effective for you  Postpartum Bleeding   WHAT YOU NEED TO KNOW:   Postpartum bleeding is vaginal bleeding after childbirth  This bleeding is normal, whether your baby was born vaginally or by    It contains blood and the tissue that lined the inside of your uterus when you were pregnant  DISCHARGE INSTRUCTIONS:   What to expect with postpartum bleeding:  Postpartum bleeding usually lasts at least 10 days, and may last longer than 6 weeks  Your bleeding may range from light (barely staining a pad) to heavy (soaking a pad in 1 hour)  Usually, you have heavier bleeding right after childbirth, which slows over the next few weeks until it stops  The bleeding is red or dark brown with clots for the first 1 to 3 days  It then turns pink for several days, and then becomes a white or yellow discharge until it ends  Follow up with your obstetrician as directed:  Do not have sex until your obstetrician says it is okay  Write down your questions so you remember to ask them during your visits  Contact your healthcare provider or obstetrician if:   · Your bleeding increases, or you have heavy bleeding that soaks a pad in 1 hour for 2 hours in a row  · You pass large blood clots  · You are breathing faster than normal, or your heart is beating faster than normal     · You are urinating less than usual, or not at all  · You feel dizzy  · You have questions or concerns about your condition or care  Seek immediate care or call 911 if:   · You are suddenly short of breath and feel lightheaded  · You have sudden chest pain  © 2017 [address] Information is for End User's use only and may not be sold, redistributed or otherwise used for commercial purposes  All illustrations and images included in CareNotes® are the copyrighted property of A D A M , Inc  or [employer]  The above information is an  only  It is not intended as medical advice for individual conditions or treatments  Talk to your doctor, nurse or pharmacist before following any medical regimen to see if it is safe and effective for you

## 2020-09-05 LAB — PLACENTA IN STORAGE: NORMAL

## 2020-09-06 LAB
BACTERIA BLD CULT: NORMAL
BACTERIA BLD CULT: NORMAL

## 2020-11-13 NOTE — UTILIZATION REVIEW
Notification of Maternity/Delivery & [place] Birth Information for Admission   Notification of Maternity/Delivery for Admission to our facility [address]  Be advised that this patient was admitted to our facility under Inpatient Status  Contact Sweetie Andrea at [phone] for additional admission information  [facility] PARENT/[facility]ILD [facility] DEPT  DEDICATED [phone]  Mother &  Information   Patient Name: Quinton Odonnell YOB: 1995   Delivering clinician:    OB History as of 10/21/2020        4    Para   3    Term   3            AB   1    Living   3       SAB   1    TAB        Ectopic        Multiple   0    Live Births   3                Name & MRN:   Information for the patient's :  Tiffany Ramsey [[id number]]      Delivery Information:  Sex: male  Delivered 2020 11:08 PM by Vaginal, Spontaneous; Gestational Age: 38w7d    [place] Measurements:  Weight: 6 lb 3 1 oz (2810 g); Height: 20"    APGAR 1 minute 5 minutes 10 minutes   Totals: 8 9      [place] Birth Information: 31 y o  female MRN: [id number] Unit/Bed#: L&D 305-01 Estimated Date of Delivery: 9/3/20  Birthweight: No birth weight on file   Gestational Age: <None> Delivery Type: Vaginal, Spontaneous          APGARS  One minute Five minutes Ten minutes   Totals:                 [address]   [address]:   [place]  Tax ID: [id number]  NPI: [id number] Attending Provider/NPI:  Phone:  Address: Ting Fagan [[id number]]  [phone]  Same as VERONICA/Davion Pedro 1106 of Service Code: 24 Place of Service Name: [address] [place] [address]   Start Date: 20   Discharge Date & Time: 2020 12:15 PM    Type of Admission: Inpatient Status Discharge Disposition (if discharged): Home/Self Care   Patient Diagnoses:   Abdominal pain in pregnancy [O26 899, R10 9]  The encounter diagnosis was 38 weeks gestation of pregnancy  1  38 weeks gestation of pregnancy       Orders: Admission Orders (From admission, onward)     Ordered        08/26/20 2004  Inpatient Admission  Once                    Assigned Utilization Review Contact: Allison Quezada Utilization Review Department  Phone: [phone]; Fax [phone]  Email: [email or web address]

## 2020-11-23 DIAGNOSIS — Z30.42 ENCOUNTER FOR SURVEILLANCE OF INJECTABLE CONTRACEPTIVE: Primary | ICD-10-CM

## 2020-11-23 DIAGNOSIS — Z30.013 ENCOUNTER FOR INITIAL PRESCRIPTION OF INJECTABLE CONTRACEPTIVE: Primary | ICD-10-CM

## 2020-11-23 RX ORDER — MEDROXYPROGESTERONE ACETATE 150 MG/ML
150 INJECTION, SUSPENSION INTRAMUSCULAR ONCE
Status: CANCELLED | OUTPATIENT
Start: 2020-11-23

## 2020-11-23 RX ORDER — MEDROXYPROGESTERONE ACETATE 150 MG/ML
150 INJECTION, SUSPENSION INTRAMUSCULAR
Qty: 1 ML | Refills: 1 | Status: SHIPPED | OUTPATIENT
Start: 2020-11-23 | End: 2021-01-22 | Stop reason: ALTCHOICE

## 2020-11-23 NOTE — Clinical Note
Reached out to patient due to missed appoihntments post partum  Scheduled patient for 12/22 first late night that patient could make  Needs a depo and is going to come on lunch break 12/1 but needs an order for it  Wasn't sure of the procedure for this  If you needed to see her for PP before administering or if she can get it and follow up later (as scheduled) for PP visit  LMK if I need to change this

## 2020-11-24 NOTE — PROGRESS NOTES
Caridad Srivastava like Dr Richard Iqbal put in the order for Depo-Provera  It seems that she got this around 8/28/20 after her delivery  Twelve weeks from then is around 11/21/20 which has already happened  Would suggest she come in as soon as possible for the Depo-Provera injection, this week if possible  Would check urine pregnancy test   Do not want to see possible contraception gap  She could then follow-up for exam as scheduled with me in December

## 2020-12-01 ENCOUNTER — TELEPHONE (OUTPATIENT)
Dept: OBGYN CLINIC | Facility: CLINIC | Age: 25
End: 2020-12-01

## 2020-12-09 ENCOUNTER — TELEPHONE (OUTPATIENT)
Dept: LABOR AND DELIVERY | Facility: HOSPITAL | Age: 25
End: 2020-12-09

## 2021-01-22 ENCOUNTER — OFFICE VISIT (OUTPATIENT)
Dept: OBGYN CLINIC | Facility: CLINIC | Age: 26
End: 2021-01-22
Payer: COMMERCIAL

## 2021-01-22 VITALS — SYSTOLIC BLOOD PRESSURE: 108 MMHG | DIASTOLIC BLOOD PRESSURE: 68 MMHG | BODY MASS INDEX: 32.22 KG/M2 | WEIGHT: 165 LBS

## 2021-01-22 DIAGNOSIS — N92.6 IRREGULAR BLEEDING: ICD-10-CM

## 2021-01-22 DIAGNOSIS — Z30.09 BIRTH CONTROL COUNSELING: Primary | ICD-10-CM

## 2021-01-22 PROBLEM — Z3A.38 38 WEEKS GESTATION OF PREGNANCY: Status: RESOLVED | Noted: 2020-04-16 | Resolved: 2021-01-22

## 2021-01-22 PROBLEM — O99.210 OBESITY AFFECTING PREGNANCY: Status: RESOLVED | Noted: 2020-07-30 | Resolved: 2021-01-22

## 2021-01-22 PROBLEM — O46.90 VAGINAL BLEEDING DURING PREGNANCY, ANTEPARTUM: Status: RESOLVED | Noted: 2020-07-23 | Resolved: 2021-01-22

## 2021-01-22 PROCEDURE — 99214 OFFICE O/P EST MOD 30 MIN: CPT | Performed by: OBSTETRICS & GYNECOLOGY

## 2021-01-22 NOTE — PROGRESS NOTES
Assessment/Plan   Diagnoses and all orders for this visit:    Birth control counseling    Irregular bleeding    1  Postpartum-patient never did return for postpartum visit  Her baby is now almost 11month-old  Offered pelvic exam today, she wishes to defer  She denies any problems  She will call or return with any issues  She denies any postpartum depression or other concerns  2  Irregular bleeding-received Depo-Provera  on 8/27/20, the day after her delivery  She had bleeding for 12 weeks time and also had emotional irritability on that medication  She wishes to defer this  Her periods have normalized since stopping Depo-Provera, LMP 1/8/21 through 1/13/21  3  Contraception-did take OCP in the past, had difficulty remembering to take it  Had adverse effects on Depo-Provera as noted above  Did use Nexplanon the past, overall was okay but did have irregular bleeding particularly in the 1st few months or so  She was counseled about options and given the contraceptive options sheet  She is interested in Mirena IUD  Risks and benefits of the device were reviewed and insertion process was discussed  Mirena pamphlet was given  Recommend ibuprofen or naproxen prior to the procedure  To return in the near future for IUD insertion  Subjective   Patient ID: Hawa See is a 31 y o  female  Vitals:    01/22/21 1213   BP: 108/68     Patient was seen today for follow-up visit  Please see assessment plan for details        The following portions of the patient's history were reviewed and updated as appropriate: allergies, current medications, past family history, past medical history, past social history, past surgical history and problem list   Past Medical History:   Diagnosis Date    Abnormal Pap smear of cervix     Anemia     Asthma     Gonorrhea     Migraine     tylenol    Miscarriage     Nexplanon removal     last assessed 02/07/17    Urinary tract infection     Varicella patient had chicken pox     Past Surgical History:   Procedure Laterality Date    TONSILLECTOMY       OB History    Para Term  AB Living   4 3 3   1 3   SAB TAB Ectopic Multiple Live Births   1     0 3      # Outcome Date GA Lbr Ronald/2nd Weight Sex Delivery Anes PTL Lv   4 Term 20 38w6d  2810 g (6 lb 3 1 oz) M Vag-Spont EPI N MARGO   3 Term 17 38w2d / 00:36 3260 g (7 lb 3 oz) M Vag-Spont EPI N MARGO   2 Term 13 40w0d   M    MARGO   1 SAB                Current Outpatient Medications:     albuterol (PROVENTIL HFA,VENTOLIN HFA) 90 mcg/act inhaler, Inhale 2 puffs every 6 (six) hours as needed for wheezing, Disp: , Rfl:     ferrous sulfate 324 (65 Fe) mg, Take 1 tablet (324 mg total) by mouth daily before breakfast, Disp: 90 tablet, Rfl: 1    medroxyPROGESTERone (DEPO-PROVERA) 150 mg/mL injection, Inject 1 mL (150 mg total) into a muscle every 3 (three) months (Patient not taking: Reported on 2021), Disp: 1 mL, Rfl: 1    Prenatal Vit w/Qj-Spcfyvzxp-JW (PNV PO), Take 1 tablet by mouth daily, Disp: , Rfl:   Allergies   Allergen Reactions    Fruit Extracts      Category: Allergy;  Annotation - [id number]: pomegranite lotion has skin reaction, pomegranite fruit has itching and swelling     Social History     Socioeconomic History    Marital status: Single     Spouse name: None    Number of children: 1    Years of education: None    Highest education level: None   Occupational History    None   Social Needs    Financial resource strain: None    Food insecurity     Worry: None     Inability: None    Transportation needs     Medical: None     Non-medical: None   Tobacco Use    Smoking status: Never Smoker    Smokeless tobacco: Never Used   Substance and Sexual Activity    Alcohol use: Not Currently     Comment: social    Drug use: No    Sexual activity: Not Currently     Partners: Male     Birth control/protection: None   Lifestyle    Physical activity     Days per week: None Minutes per session: None    Stress: None   Relationships    Social connections     Talks on phone: None     Gets together: None     Attends Evangelical service: None     Active member of club or organization: None     Attends meetings of clubs or organizations: None     Relationship status: None    Intimate partner violence     Fear of current or ex partner: None     Emotionally abused: None     Physically abused: None     Forced sexual activity: None   Other Topics Concern    None   Social History Narrative    Always uses seat belt    Caffeine use    No Evangelical beliefs     Family History   Problem Relation Age of Onset    Asthma Mother     Asthma Father     Cancer Family         mother and father side    No Known Problems Son     No Known Problems Half-Sister     No Known Problems Half-Sister     No Known Problems Half-Sister     Asthma Half-Brother     No Known Problems Half-Brother     No Known Problems Son        Review of Systems    Objective   Physical Exam    Objective      /68 (BP Location: Left arm, Patient Position: Sitting, Cuff Size: Adult)   Wt 74 8 kg (165 lb)   LMP 01/08/2021   BMI 32 22 kg/m²     General:   alert and oriented, in no acute distress   Neck:    Breast:    Heart:    Lungs:    Abdomen: Nontender   Vulva:    Vagina:    Cervix:    Uterus:    Adnexa:    Rectum:     Psych:  Normal mood and affect   Skin:  Without obvious lesions   Eyes: symmetric, with normal movements and reactivity   Musculoskeletal:  Normal muscle tone and movements appreciated

## 2021-01-22 NOTE — PATIENT INSTRUCTIONS
Intrauterine Device   WHAT YOU NEED TO KNOW:   What is an intrauterine device (IUD)? An IUD is a type of birth control that is inserted into your uterus  It is a small, flexible piece of plastic with a string on the end  It is inserted and removed by your healthcare provider  IUDs prevent sperm from reaching or fertilizing an egg  IUDs also prevent a fertilized egg from attaching to the uterus and developing into a fetus  What are the most common types of IUDs? Your healthcare provider will recommend the type of IUD that is right for you  This is based on your age and if you have had a child  If you have not had a child, a smaller IUD will be used  · A copper IUD  slowly releases a small amount of copper into your uterus  This IUD can remain in place for up to 10 years  · A hormone-releasing IUD  slowly releases a small amount of progesterone into your uterus  Progesterone is a hormone that is made by your body to help control your periods  This IUD can remain in place for 3 to 5 years  What are the advantages of an IUD? · An IUD is 98% to 99% effective in preventing pregnancy  · The IUD can be removed by your healthcare provider if you decide to have a baby  You may be able to get pregnant as soon as the IUD is removed  · An IUD protects you from pregnancy right after it is inserted  · You do not have to stop sexual activity to insert it  You do not have to remember to take your birth control pill  · Copper IUDs are safer for some women than oral birth control pills  Examples include women who smoke or have a history of blood clots  · Hormone-releasing IUDs may decrease certain health problems  Examples include bleeding and cramping that happen with your monthly period  What are the disadvantages of an IUD? · There is a small chance that you could get pregnant  Sometimes the IUD cannot be removed after you get pregnant   This increases your risk of a miscarriage or an ectopic pregnancy  Ectopic pregnancy is when the fertilized egg starts to grow somewhere other than your uterus  · An IUD does not protect you from sexually transmitted infections  · You may have cramps during the first weeks after you get the IUD  · A copper IUD may cause your monthly period to be heavier or more painful  This is more common within the first 3 months after you get the IUD  You may need to have your IUD removed if your bleeding or pain becomes severe  You may have spotting between periods  · There is a small risk of an infection within the first 20 days after the IUD is placed  Infection can lead to pelvic inflammatory disease  This can cause infertility  · Your uterus may tear when the IUD is inserted  The IUD may slip part or all of the way out of your uterus  How is the IUD inserted? · The IUD is usually inserted during your monthly period  This may help decrease the amount of discomfort you have during the procedure  It also helps make sure that you are not pregnant  You will be asked to lie down and place your feet in stirrups  Your healthcare provider will gently insert a speculum into your vagina  This is the same tool used during a Pap smear  The speculum allows your healthcare provider to see inside your vagina to your cervix  The cervix is the opening of your uterus  · Your healthcare provider will clean your cervix with an antiseptic solution to prevent infection  You may be given numbing medicine  A long plastic tube is gently passed through your cervix and into your uterus  This tube has the IUD inside of it  The IUD is pushed out of the tube and into your uterus  You may have cramps as the IUD is inserted  The tube is removed after the IUD is in place  How can I make sure my IUD is still in place? An IUD has a string made of plastic thread  One to 2 inches of this string hangs into your vagina   You cannot see this string, and it should not cause problems when you have sex  Check your IUD string every 3 days for the first 3 months after it is inserted  After that, check the string after each monthly period  Do the following to check the placement of your IUD:  · Wash your hands with soap and warm water  Dry them with a clean towel  · Bend your knees and squat low to the ground  · Gently put your index finger inside your vagina  The cervix is at the top of the vagina and feels like the tip of your nose  Feel for the IUD string  Do not pull on the string  You should not be able to feel the firm plastic of the IUD itself  · Wash your hands after you check your IUD string  Where can I find more information? · [facility] [address] , [address] [place]  Phone: [phone]  Web Address: https://[employer]    When should I seek immediate care? · You have severe pain or bleeding during your period  · You have a fever and severe abdominal pain  When should I call my doctor? · You think you are pregnant  · The IUD has come out  · You have bleeding from your vagina after you have sex, and it is not your period  · You have pain during sex  · You cannot feel the IUD string, the string feels longer, or you feel the plastic of the IUD itself  · You have vaginal discharge that is green, yellow, or has a foul odor  · You have questions or concerns about your condition or care  CARE AGREEMENT:   You have the right to help plan your care  Learn about your health condition and how it may be treated  Discuss treatment options with your healthcare providers to decide what care you want to receive  You always have the right to refuse treatment  The above information is an  only  It is not intended as medical advice for individual conditions or treatments  Talk to your doctor, nurse or pharmacist before following any medical regimen to see if it is safe and effective for you    © Copyright [address] Information is for [employer] use only and may not be sold, redistributed or otherwise used for commercial purposes   All illustrations and images included in CareNotes® are the copyrighted property of [employer] M , Inc  or [address] [place], [place] [address]heather gregg

## 2021-02-13 NOTE — PROGRESS NOTES
[facility]  [facility] Failure [facility]  [facility]  Phone [phone] Fax [phone]    DATE OF CLINIC VISIT: 20  : 54  Chief Complaint: follow up after IV diuretics, patient states \"I still get short of breath\"  History of Present Illness: Patient was seen today in [facility]. Patient is a 62 yo M, patient of Dr. Elam, PMH of CAD (s/p PCI and CABG), CHF, HTN, HL, DVT, DM. Patient was last hospitalized in 2019 after ICD placement. Patient was seen in the clinic last week with complaints of weight gain, [place], abdominal bloating. Patient was given IV diuretics at that time and presents today for follow up. Patient presents to the clinic ambulatory.  He is accompanied by his wife. Medications reviewed and no updates noted. Patient was seen by Dr. Elam last week with no changes made to his medications. Patient notes that after receiving the IV diuretics his weight went down about 6 lbs. His breathing has somewhat improved but he still is having difficulty when ambulating. He also still reports some abdominal bloating. Patient denies CP, dizziness, palpitations. Patient sleeps on 2 pillows and denies PND. Good appetite. Functional capacity is unchanged from last visit. He has been compliant with medications and daily weights. His home weight today is 244 lbs, which is down about 6 lbs from last week. Patient reports being more compliant with the sodium and fluid restrictions. He has cut back his fluids to less than 64 oz a day.  PMH: as above    Allergies: NKDA    VS:   Today’s WT: 249.5 lbs   Previous WT: 256.3 lbs  BP: 117/86  HR: 82  RR: 16  SaO2: 94% on RA  Pain: 0/10     ROS:   12 point ROS obtained and is negative except for HPI    Physical Exam:  Constitutional: Appears well-developed and well-nourished. No distress  Neurological: Patient is alert and appropriate  Head: Normocephalic and atraumatic  Eyes: Conjunctivae are normal  Neck:  Please refer to the [facility] ultrasound report in Ob Procedures for additional information regarding the visit to the [facility] the [facility], [facility]  today  Neck supple, difficult to assess JVD due to body habitus  Cardiac: Normal rate, regular rhythm and normal heart sounds. Exam reveals no gallop and no friction rub, no murmur appreciated   Pulmonary/Chest: Effort is normal and breath sound are clear. No respiratory distress. No wheezing , No rales  Abdominal: Bowel sounds are normal, Abdomen is distended and non-tender  Extremities: Trace BLE edema  Skin: Skin is warm and dry. No rash noted. There is no diaphoresis, no erythema, no pallor  Psychiatric: pleasant mood    NYHA Functional Class: III  LABS: In cerner  12/20/19: Cr 1.80, BUN 29, K 3.1 (from PCP paperwork)  1/16/20: Cr 2.07, BUN 19, K 4.1  ECHO: From 12/12/18  IMPRESSION:    1. Mildly dilated LV with generalized hypokinesis with ejection  fraction diminished at 30-35%.  Anterobasal has mild height akinesis.  2.  Diastolic dysfunction  3.  Left atrial enlargement.  4.  No significant valvular abnormalities.    EDUCATION: Discussed importance of recognizing symptoms of heart failure, daily weights, low sodium diet and exercise. Patient advised to call heart failure center with symptoms of heart failure or weight gain of 3 lbs in 1 day or 5 lbs in a week. Patient instructed to follow 2000 mg low sodium diet. Sodium content pamphlets and nutrition label reviewed. Patient has been more compliant with the sodium restriction.    ASSESSMENT/PLAN:   Acute on Chronic Systolic Heart Failure   Patient with complaints of SOB/[place], abdominal bloating.  He denies CP, dizziness, palpitations. Patient received IV diuretics last week with some improvement in symptoms, but is still symptomatic. Patients weight is down about 6 lbs from last OV. He has been more compliant with the sodium and fluid restrictions.  -Guideline directed Medical Therapy      BB: carvedilol 6.25 mg BID     ACEI/ARB/ARNI: not on due to kidney function     MRA: not on due to kidney function     Diuretics: bumex 2 mg BID     Hydralazine/Nitrates: none      Devices: ICD  -administered 2 mg IV bumex  -patient advised to hold afternoon dose of bumex today  -continue daily weights and call with weight gain as directed  -continue sodium restriction 2000 mg daily or less  -continue fluid restriction of 64 oz or less daily  -follow up in HF clinic in 1-2 weeks    HTN  BP controlled on exam. Patient denies HA, blurred vision, dizziness. Patient is on carvedilol, bumex  -CPM  -patient to follow up with Dr. Elam as scheduled     Hyperlipidemia  Patient is on statin.   -CPM  -patient to follow up with Dr. Elam as scheduled         Total time spent with patient was 35 minutes, greater than 50% of this office visit was spent counseling patient on medications and side effects, nutrition and test results.     Patient History: Home Medications  amiodarone (amiodarone 200 mg oral tablet) 200 mg = 1 tab, PO, Tablet, qDay, # 30 tab  01/16/2020 11:06  gabapentin (gabapentin 100 mg oral capsule) 100 mg = 1 cap, PO, Cap, QID, # 240 cap(s)  01/16/2020 11:06  insulin isophane (NPH) (NovoLIN N) See Instructions, 100u SubQ BID  01/16/2020 11:06  insulin aspart (NovoLOG) See Instructions, SubQ TIDAC sliding scale after meals.  07/30/2019 16:29  Misc Medication (multi vitamin plus D3) multi vitamin plus D3, See Instructions  07/30/2019 16:29  bumetanide (bumetanide 2 mg oral tablet) 2 mg = 1 tab(s), PO, Tab, BID, # 30 tab(s)  05/15/2018 16:21  carvedilol (Coreg 6.25 mg oral tablet) 6.25 mg = 1 tab, PO, Tablet, qPM  05/15/2018 16:15  clopidogrel (Plavix 75 mg oral tablet) 75 mg = 1 tab(s), PO, Tab, qDaylamar md instructions, # 30 tab(s)  07/20/2017 10:28  aspirin (aspirin 325 mg oral tablet) 325 mg = 1 tab, PO, Tablet, lamar Chery md instrructions  07/20/2017 10:28  atorvastatin (atorvastatin 80 mg oral tablet) 80 mg = 1 tab, PO, Tablet, qDay  05/22/2017 06:55

## 2021-03-10 ENCOUNTER — OFFICE VISIT (OUTPATIENT)
Dept: OBGYN CLINIC | Facility: CLINIC | Age: 26
End: 2021-03-10
Payer: COMMERCIAL

## 2021-03-10 VITALS
SYSTOLIC BLOOD PRESSURE: 116 MMHG | BODY MASS INDEX: 33.14 KG/M2 | DIASTOLIC BLOOD PRESSURE: 62 MMHG | WEIGHT: 168.8 LBS | HEIGHT: 60 IN

## 2021-03-10 DIAGNOSIS — Z30.430 ENCOUNTER FOR INSERTION OF INTRAUTERINE CONTRACEPTIVE DEVICE (IUD): Primary | ICD-10-CM

## 2021-03-10 LAB — SL AMB POCT URINE HCG: NORMAL

## 2021-03-10 PROCEDURE — 81025 URINE PREGNANCY TEST: CPT | Performed by: OBSTETRICS & GYNECOLOGY

## 2021-03-10 PROCEDURE — 3008F BODY MASS INDEX DOCD: CPT | Performed by: OBSTETRICS & GYNECOLOGY

## 2021-03-10 PROCEDURE — 58300 INSERT INTRAUTERINE DEVICE: CPT | Performed by: OBSTETRICS & GYNECOLOGY

## 2021-03-10 PROCEDURE — 3008F BODY MASS INDEX DOCD: CPT | Performed by: FAMILY MEDICINE

## 2021-03-10 NOTE — PROGRESS NOTES
Iud insertions    Date/Time: 3/10/2021 12:27 PM  Performed by: Gardenia Crawley DO  Authorized by: Gardenia Crawley DO   Universal Protocol:  Consent: Written consent obtained  Risks and benefits: risks, benefits and alternatives were discussed  Consent given by: patient  Patient understanding: patient states understanding of the procedure being performed  Patient consent: the patient's understanding of the procedure matches consent given  Procedure consent: procedure consent matches procedure scheduled  Relevant documents: relevant documents present and verified  Patient identity confirmed: verbally with patient        Procedure:     Pelvic exam performed: yes      Negative GC/chlamydia test: yes      Negative urine pregnancy test: yes      Cervix cleaned and prepped: yes      Speculum placed in vagina: yes      Tenaculum applied to cervix: Allis  Uterus sounded: 7 5  IUD inserted with no complications: yes      IUD type:  Mirena    Strings trimmed: yes    Post-procedure:     Patient tolerated procedure well: yes      Patient will follow up after next period: yes    Comments:       6 weeks  Reviewed side effects

## 2021-03-15 ENCOUNTER — OFFICE VISIT (OUTPATIENT)
Dept: FAMILY MEDICINE CLINIC | Facility: CLINIC | Age: 26
End: 2021-03-15

## 2021-03-15 DIAGNOSIS — R11.0 NAUSEA: ICD-10-CM

## 2021-03-15 DIAGNOSIS — R05.9 COUGH: ICD-10-CM

## 2021-03-15 DIAGNOSIS — Z20.822 SUSPECTED COVID-19 VIRUS INFECTION: ICD-10-CM

## 2021-03-15 DIAGNOSIS — Z20.822 SUSPECTED COVID-19 VIRUS INFECTION: Primary | ICD-10-CM

## 2021-03-15 PROCEDURE — 99214 OFFICE O/P EST MOD 30 MIN: CPT | Performed by: FAMILY MEDICINE

## 2021-03-15 PROCEDURE — U0005 INFEC AGEN DETEC AMPLI PROBE: HCPCS | Performed by: FAMILY MEDICINE

## 2021-03-15 PROCEDURE — U0003 INFECTIOUS AGENT DETECTION BY NUCLEIC ACID (DNA OR RNA); SEVERE ACUTE RESPIRATORY SYNDROME CORONAVIRUS 2 (SARS-COV-2) (CORONAVIRUS DISEASE [COVID-19]), AMPLIFIED PROBE TECHNIQUE, MAKING USE OF HIGH THROUGHPUT TECHNOLOGIES AS DESCRIBED BY CMS-2020-01-R: HCPCS | Performed by: FAMILY MEDICINE

## 2021-03-15 RX ORDER — ONDANSETRON 4 MG/1
4 TABLET, ORALLY DISINTEGRATING ORAL EVERY 6 HOURS PRN
Qty: 20 TABLET | Refills: 0 | Status: SHIPPED | OUTPATIENT
Start: 2021-03-15

## 2021-03-15 RX ORDER — BENZONATATE 100 MG/1
100 CAPSULE ORAL 3 TIMES DAILY PRN
Qty: 20 CAPSULE | Refills: 0 | Status: SHIPPED | OUTPATIENT
Start: 2021-03-15

## 2021-03-15 NOTE — PATIENT INSTRUCTIONS
COVID-19 and Chronic Health Conditions   WHAT YOU NEED TO KNOW:   Your chronic condition may increase your risk for COVID-19 or serious problems it can cause, such as pneumonia  Healthcare providers might need to make changes that affect how you usually manage your chronic health condition  Providers may change hours of operation or not have patients come in to be seen  You may not be able to make appointments to get blood drawn or to have tests or procedures  This may continue until the virus that causes COVID-19 is controlled  Until then, you can take steps to manage your condition  The steps will also lower your risk for COVID-19 or the serious problems it causes  DISCHARGE INSTRUCTIONS:   If you think you may be infected with the new coronavirus,  do the following to protect others:  · If emergency care is needed,  tell the  about the possible infection, or call ahead and tell the emergency department  · Call a healthcare provider  for instructions if symptoms are mild  Do not  arrive without calling first  Your provider will need to protect staff members and other patients  · Cover your mouth and nose  while you are getting medical care  This will help lower the risk of infecting others  Call your local emergency number [phone] in the [address] [place] [address],[address]) or emergency department if:   · You have trouble breathing or shortness of breath  · You have chest pain or pressure that lasts longer than 5 minutes  · You become confused or hard to wake  · Your lips or face are blue  · You have a fever of 104°F (40°C) or higher  Call your doctor or healthcare provider if:   · You do not  have symptoms of COVID-19 but had close physical contact within 14 days with someone who tested positive  · You have questions or concerns about your COVID-19 or your chronic condition      The following may increase your risk for serious effects of COVID-19:   · Age 72 years or older    · Obesity, diabetes, cancer, or a liver disease    · Kidney failure, chronic kidney disease, or sickle cell disease    · Lung disease, COPD, moderate-to-severe asthma, cystic fibrosis, or pulmonary fibrosis (scarring in your lungs)    · A severe heart disease, such as coronary artery disease or heart failure    · A brain blood vessel disorder or disease, or a condition such as dementia    · Living in a nursing home or long-term care facility    · Smoking cigarettes    · Hypertension (high blood pressure) or thalassemia    · An immune system problem, HIV or AIDS, or a blood or bone marrow transplant    · Long-term use of certain medicines, such as corticosteroids    · Pregnancy    Manage your chronic health condition during this time:  If you do not have a regular healthcare provider, experts recommend you contact a local [facility] health center or health department  The following can help you manage your condition and prevent COVID-19:  · Get emergency care for your condition if needed  Talk to your healthcare providers about symptoms of your chronic condition that need immediate care  Your providers can help you create a plan or add exacerbation management to your plan  The plan will include when to go to an emergency department and when to call your local emergency number  This will depend on where you live and the services that are available during this time  · Go to dialysis appointments as scheduled  It is important to stay on schedule  You will need to have enough food to be able to follow the emergency diet plan if you must miss a session  The emergency diet needs to be part of the management plan for your condition  · Reschedule any upcoming appointments as needed  Medical facilities may be closed until the new coronavirus is better controlled  This means you may need to reschedule a surgery, procedure, or check-up appointment  If you cannot have a phone or video appointment, you will need to make a new appointment   Some providers may be scheduling appointments several months in advance  Some surgeries and procedures will happen as scheduled  This depends on the medical condition and the reason for the surgery or procedure  You may need to have extra testing for COVID-19 several days before  · Follow any regular management plan you use  Your healthcare provider will tell you if you need to make any changes to your regular management plan  For example, if you have asthma, continue to follow your asthma action plan  If you have diabetes, you may need to check your blood sugar level more often  Stress and illness can make blood sugar levels go up  You may need to adjust medicine such as insulin  If you have a heart condition or high blood pressure, you may need to check your blood pressure more often  Stress and illness can also raise your blood pressure  · Talk to your healthcare providers about your medicines  You may be able to get more than 1 month of medicine at a time  This will lower the number of times you need to go to a pharmacy to get your medicines  Make sure you have enough medicine if you have a condition that can lead to an emergency  Examples include asthma medicines, insulin, or an epinephrine pen  Check the expiration dates on the medicines you currently have  Ask for refills as soon as possible, if needed  If it is not time to refill prescriptions, you may be able to get an emergency supply of some medicines  Medicine plans vary, so ask your healthcare provider or pharmacist for options  · Have supplies available in your home  If possible, get extra supplies you use regularly  Examples include absorbent pads, syringes, and wound cleaning solutions  This will limit the number of trips out of your home to get supplies  · Know the signs and symptoms of COVID-19  Signs and symptoms usually start about 5 days after infection but can take 2 to 14 days  Signs and symptoms range from mild to severe   You may feel like you have the flu or a bad cold  Your chronic health condition may cause some of the same symptoms COVID-19 causes  This can make it hard to know if a symptom is from COVID-19 or your chronic condition  Keep a record of any new or worsening symptom you have  This is especially important if you have a condition that often causes shortness of breath  Your provider can tell you if you should be tested for COVID-19  Information is still being learned  Tell your healthcare provider if you think you were infected but develop signs or symptoms not listed below:    ? A cough    ? Shortness of breath or trouble breathing that may become severe    ? A fever of at least 100 4°F, or 38°C (may be lower in adults 65 or older)    ? Chills that might include shaking    ? Muscle pain, body aches, or a headache    ? A sore throat    ? Suddenly not being able to taste or smell anything    ? Feeling very tired (fatigue)    ? Congestion (stuffy head and nose), or a runny nose    ? Diarrhea, nausea, or vomiting    What you can do to prevent having to go out of your home during this time:   · Ask your healthcare provider for other ways to have appointments  You may be able to have appointments without having to go into the provider's office  Some providers offer phone, video, or other types of appointments  · Have food, medicines, and other supplies delivered  Some pharmacies can send certain medicines to you through the mail  Grocery stores and restaurants may be able to deliver food and other items  If possible, have delivered items placed somewhere  Try not to have someone hand you an item  You will be so close to the person that the virus can spread between you  Wash your hands after you put the delivered items away  · Ask someone to get items you need  The person can get groceries, medicines, or other needed items for you  Choose a person who does not have signs or symptoms of COVID-19 or has tested negative for it   The person should not be waiting for test results  He or she should not have a condition that increases the risk for COVID-19 or serious problems it causes  Lower your risk for COVID-19:  The virus that causes COVID-19 spreads quickly and easily  It mainly spreads through droplets that form when a person talks, coughs, or sneezes  An infected person may also be able to leave the virus on objects and surfaces  Another person can get the virus on his or her hands by touching the object or surface  Infection happens if the person then touches his or her eyes or mouth with unwashed hands  The best way to prevent infection is to avoid anyone who is infected, but this can be hard to do  An infected person can spread the virus before signs or symptoms begin, or even if signs or symptoms never develop  The following are ways to prevent the spread of the virus and lower your risk for COVID-19:      · Wash your hands often throughout the day  Use soap and water  Rub your soapy hands together, lacing your fingers  Wash the front and back of each hand, and in between your fingers  Use the fingers of one hand to scrub under the fingernails of the other hand  Wash for at least 20 seconds  Rinse with warm, running water for several seconds  Then dry your hands with a clean towel or paper towel  Use hand  that contains alcohol if soap and water are not available  Do not touch your eyes, nose, or mouth without washing your hands first  Teach children how to wash their hands  · Cover sneezes and coughs  Turn your face away and cover your mouth and nose with a tissue  Throw the tissue away  Use the bend of your arm if a tissue is not available  Then wash your hands well with soap and water or use hand   Turn your head and cover your face if someone near you is coughing or sneezing  Teach children how to cover a cough or sneeze  Remind them to wash their hands  · Make a habit of not touching your face    If you get the virus on your hands, you can transfer it to your eyes, nose, or mouth and become infected  · Clean and disinfect high-touch surfaces and objects in your home often  Do this regularly, even if you think no one living in or coming to your home is infected with the virus  Surfaces include countertops, cupboard doors, desks, handles, handrails, doorknobs, toilet handles, faucets, chairs, and light switches  Objects include keys, phones, computer keyboards and mice, video games, remote controls, and children's toys  Some surfaces and objects may need to be cleaned several times each day, depending on how often they are used  ? Use disinfecting wipes or a disinfecting solution  You can make a solution by mixing 4 teaspoons of bleach with 1 quart (4 cups) of water  Clean with a sponge or cloth that can be thrown away or washed in hot, soapy water and reused  Clean used dishes and utensils in hot, soapy water or in the   ? Be careful with   Do not use any cleaning or disinfecting products that can trigger an asthma attack or other breathing problems  Open windows or have circulating air as you clean  Do not  mix ammonia with bleach  This will create toxic fumes  Read the labels of all products you use  · Ask about vaccines you may need  No vaccine is available yet for the new coronavirus  It is still a good idea to get other vaccines to help protect your immune system  Get the influenza (flu) vaccine as soon as recommended each year, usually starting in September or October  A flu infection can make COVID-19 worse if you have them at the same time  The flu can also cause signs and symptoms that are similar to COVID-19  Get the pneumonia vaccine if recommended  Your healthcare provider can tell you if you also need other vaccines, and when to get them    Follow social distancing guidelines if you must go out of your home during this time:  Social distancing means people stay far enough apart physically that the virus cannot spread from one person to another  National and local social distancing rules vary  Rules may change over time as restrictions are lifted, but they may return if an outbreak happens where you live  It is important to know and follow all current social distancing rules in your area  The following are general guidelines:  · Limit trips out of your home  Most local guidelines allow leaving the home to buy food or supplies, or to seek medical care if necessary  · Stay at least 6 feet (2 meters) away from anyone who does not live in your home  Do not shake hands with, hug, or kiss a person as a greeting  Stand or walk as far from others as possible, especially around anyone who is sneezing or coughing  If you must use public transportation (such as a bus or subway), try to sit or stand away from others  You can stay safely connected with others through phone calls, e-mail messages, social media websites, and video chats  Check in on anyone who may be having a hard time socially distancing, or who lives alone  Ask administrators at nursing homes or long-term care facilities how you can safely communicate with someone living there  · Wear a cloth face covering (mask) when you must go out  Only do this if your chronic condition does not cause breathing problems  You can make a face covering out of a thick cloth  This will save medical masks for healthcare workers and first responders  Choose fabric that holds its shape after it is washed and dried, such as cotton  Put the top half of a paper coffee filter in the middle of the fabric to create an extra filter for you  Check that you can breathe through the fabric when it is folded into several layers  Fold the fabric into a long band  Put each end through a rubber band or hair tie to create ear loops  Fold the ends of the fabric toward the middle  Hold the covering to your face   Adjust it so it covers your nose and mouth completely  Hook the loops onto your ears to keep the covering in place  The following are important points to remember:     ? Do not use a plastic face shield instead of a cloth covering  A face shield will not protect others from droplets  If a face shield must be used, choose one that wraps around both sides of the face and goes below the chin  Do not use disposable shields more than 1 time  Renaye Mantis that can be used again need to be cleaned and disinfected between uses  Do not put a face shield or a cloth covering on a  or infant  These increase the risk for sudden infant death syndrome (SIDS)  ? Continue social distancing while you are out  A face covering does not mean you can have close physical contact with others  You still need to stay at least 6 feet (2 meters) away from others  ? Do not touch your face covering or eyes while you are wearing the covering  Your eyes will not be covered by the cloth  You can be infected if the virus is on your hand and you touch your eyes  Wash your hands with soap and water for 20 seconds or use hand  before you remove your face covering  ? Do not remove your face covering to talk, sneeze, or cough  Your face covering will help protect you and others around you  ? Wash face coverings often  Wash them in a washing machine in the warmest water the fabric allows  Make sure the fabric is completely dry before you use the face covering again  Only wear clean coverings when you go out  Use a new coffee filter each time  ? Certain individuals should not use face coverings  Do not put a face covering on anyone who is younger than 2 years  Quiana Ko children may not be able to breathe through the covering  Do not put a face covering on anyone who cannot remove it by himself or herself  ? You can use a clear face covering if others need to read your lips    A clear covering may be helpful if you communicate with someone who is deaf or hard of hearing  A clear covering is made of cloth but has plastic over the mouth area  This will help the person see your lips more easily  Do not use a plastic face shield instead  ? Do not use face coverings that have breathing valves or vents  Valves or vents on the sides are designed to allow breathed air to go out  This makes breathing easier, but the virus can travel out of the valve or vent and be spread to others  · Do not have anyone over to your home unless it is necessary  It is okay to have medical workers in to provide care  Wear your face covering, and remind medical workers to wear a mask or face covering  Remind them to wash their hands when they arrive and before they leave  Do not  have family members, friends, or neighbors over, even if they are not sick  A person can pass the new virus to others before symptoms of COVID-19 begin  Some people never even develop symptoms  Children commonly have mild symptoms or no symptoms  It is important that you continue social distancing with everyone, including children  It may be hard to tell a child not to hug or kiss you  Explain that this is how he or she can help you stay healthy  · Do not go to someone else's home unless it is necessary  Do not go over to visit, even if the person is lonely  Only go if you need to help him or her  · Avoid large gatherings and crowds  Gatherings or crowds of 10 or more individuals can cause the virus to spread  Examples of gatherings include parties, sporting events, Taoist services, and conferences  Crowds may form at beaches, [place], and tourist attractions  You can continue to protect yourself by staying away from large gatherings and crowds  · Stay safe if you must go out to work  You may have a job only done outside your home that is considered essential  Keep physical distance between you and other workers as much as possible  Follow your employer's rules so everyone stays safe      Help strengthen your immune system:   · Do not smoke  Nicotine and other chemicals in cigarettes and cigars can cause lung damage and increase your risk for infections such as bronchitis or pneumonia  Ask your healthcare provider for information if you currently smoke and need help to quit  E-cigarettes or smokeless tobacco still contain nicotine  Talk to your healthcare provider before you use these products  · Eat a variety of healthy foods  Examples include vegetables, fruits, whole-grain breads and cereals, lean meats and poultry, fish, low-fat dairy products, and cooked beans  Healthy foods contain nutrients that help keep your immune system strong  · Find ways to manage stress  You may be feeling more stressed than usual because of the COVID-19 outbreak  The situation is very stressful to many people  Talk to your healthcare providers about ways to manage stress during this time  Stress can lead to breathing problems or make the problems worse  Stress can trigger an attack or exacerbation of many health conditions  It is important to do things that help you feel more relaxed, such as the following:     ? Pick 1 or 2 times a day to watch the news  Constant news watching can increase your stress levels  ? Talk to a friend on the phone or through a video chat  ? Take a warm, soothing bath  ? Listen to music  ? Exercise can also help relieve stress  This may be hard if your regular gym or outdoor exercise area is closed  If you do not have exercise equipment at home, try walking inside your home  You can walk quickly or turn on music and dance  Follow up with your doctor or healthcare provider as directed: Your providers will tell you when you can come in for tests, procedures, or check-ups  Bring your symptom record with you to all appointments  Write down your questions so you remember to ask them during your visits    For more information:   · Centers for Disease Control and Prevention  [address]   [place] , [address]  Phone: 7- 377 - [phone]  Phone: 8- 436 - [phone]  Web Address: [employer] com br    © Copyright [address] Information is for End User's use only and may not be sold, redistributed or otherwise used for commercial purposes  All illustrations and images included in CareNotes® are the copyrighted property of [employer]  or [facility] Maddy Guerin   The above information is an  only  It is not intended as medical advice for individual conditions or treatments  Talk to your doctor, nurse or pharmacist before following any medical regimen to see if it is safe and effective for you

## 2021-03-15 NOTE — PROGRESS NOTES
COVID-19 Virtual Visit     Assessment/Plan:    Problem List Items Addressed This Visit     None      Visit Diagnoses     Suspected COVID-19 virus infection    -  Primary    Relevant Orders    Novel Coronavirus (Covid-19),PCR [facility] - Collected at Mobile V[facility] or [facility]    Cough        Relevant Medications    benzonatate (TESSALON PERLES) 100 mg capsule    Nausea        Relevant Medications    ondansetron (ZOFRAN-ODT) 4 mg disintegrating tablet         Disposition:     I recommended self-quarantine for 14 days and to call back for worsening symptoms or development of shortness of breath  I referred patient to one of our centralized sites for a COVID-19 swab  Provided zofran for nausea and tessalon perles for cough  Encouraged patient to use her albuterol three times a day for the next 3-5 days to help control her sob  Strict ER precautions provided  I have spent 18 minutes directly with the patient  Greater than 50% of this time was spent in counseling/coordination of care regarding: prognosis, instructions for management, patient and family education and importance of treatment compliance  Encounter provider Yaritza Scott MD    Provider located at [address] [address] [phone]    Recent Visits  No visits were found meeting these conditions  Showing recent visits within past 7 days and meeting all other requirements     Today's Visits  Date Type Provider Dept   03/15/21 Office Visit Yaritza Scott MD  Alexandra Ace   Showing today's visits and meeting all other requirements     Future Appointments  No visits were found meeting these conditions  Showing future appointments within next 150 days and meeting all other requirements      This virtual check-in was done via [employer] and patient was informed that this is a secure, HIPAA-compliant platform  She agrees to proceed      Patient agrees to participate in a virtual check in via telephone or video visit instead of presenting to the office to address urgent/immediate medical needs  Patient is aware this is a billable service  After connecting through [facility], the patient was identified by name and date of birth  Mervin Thomas was informed that this was a telemedicine visit and that the exam was being conducted confidentially over secure lines  My office door was closed  Mervin Thomas acknowledged consent and understanding of privacy and security of the telemedicine visit  I informed the patient that I have reviewed her record in Epic and presented the opportunity for her to ask any questions regarding the visit today  The patient agreed to participate  Subjective:   Mervin Thomas is a 31 y o  female who is concerned about COVID-19  Patient's symptoms include fever, fatigue, nasal congestion, rhinorrhea, anosmia, cough, shortness of breath, nausea, myalgias and headache  Patient denies chills, sore throat, loss of taste, chest tightness, abdominal pain, vomiting and diarrhea       Date of symptom onset: 3/12/2021    Exposure:   Contact with a person who is under investigation (PUI) for or who is positive for COVID-19 within the last 14 days?: Yes    Hospitalized recently for fever and/or lower respiratory symptoms?: No      Currently a healthcare worker that is involved in direct patient care?: No      Works in a special setting where the risk of COVID-19 transmission may be high? (this may include long-term care, correctional and long term facilities; homeless shelters; assisted-living facilities and group homes ): No      Resident in a special setting where the risk of COVID-19 transmission may be high? (this may include long-term care, correctional and long term facilities; homeless shelters; assisted-living facilities and group homes ): No      Boyfriend works at [employer] with multiple co workers who recently tested positive whom he had close contacts with  Boyfriend is also symptomatic  Patient has PMH of asthma and has had mild sob with increased use of albuterol  No results found for: RJ Ryan, Melissa Navas [address]  Past Medical History:   Diagnosis Date    Abnormal Pap smear of cervix     Anemia     Asthma     Gonorrhea     Migraine     tylenol    Miscarriage     Nexplanon removal     last assessed 02/07/17    Urinary tract infection     Varicella     patient had chicken pox     Past Surgical History:   Procedure Laterality Date    TONSILLECTOMY       Current Outpatient Medications   Medication Sig Dispense Refill    albuterol (PROVENTIL HFA,VENTOLIN HFA) 90 mcg/act inhaler Inhale 2 puffs every 6 (six) hours as needed for wheezing      benzonatate (TESSALON PERLES) 100 mg capsule Take 1 capsule (100 mg total) by mouth 3 (three) times a day as needed for cough 20 capsule 0    ferrous sulfate 324 (65 Fe) mg Take 1 tablet (324 mg total) by mouth daily before breakfast 90 tablet 1    ondansetron (ZOFRAN-ODT) 4 mg disintegrating tablet Take 1 tablet (4 mg total) by mouth every 6 (six) hours as needed for nausea or vomiting 20 tablet 0    Prenatal Vit w/Yx-Kevjhlxcj-ZA (PNV PO) Take 1 tablet by mouth daily       No current facility-administered medications for this visit  Allergies   Allergen Reactions    Fruit Extracts      Category: Allergy; Annotation - [id number]: pomegranite lotion has skin reaction, pomegranite fruit has itching and swelling       Review of Systems   Constitutional: Positive for fatigue and fever  Negative for activity change, appetite change and chills  HENT: Positive for congestion, rhinorrhea and sneezing  Negative for dental problem, hearing loss, sinus pain and sore throat  Eyes: Negative for photophobia, pain and visual disturbance  Respiratory: Positive for cough and shortness of breath  Negative for chest tightness and wheezing      Cardiovascular: Negative for chest pain, palpitations and leg swelling  Gastrointestinal: Positive for nausea  Negative for abdominal pain, blood in stool, constipation, diarrhea and vomiting  Genitourinary: Negative for difficulty urinating, dysuria, frequency, hematuria and urgency  Musculoskeletal: Positive for myalgias  Negative for arthralgias, back pain and neck pain  Skin: Negative for rash  Neurological: Positive for headaches  Negative for dizziness, weakness, light-headedness and numbness  Hematological: Negative for adenopathy  Psychiatric/Behavioral: Negative for agitation, behavioral problems, sleep disturbance and suicidal ideas  Objective: There were no vitals filed for this visit  Patient appears to be in no distress over the phone  They are able to speak in full sentences and displays no conversational dyspnea  There is an intermittent audible cough  It was my intent to perform this visit via video technology but the patient was not able to do a video connection so the visit was completed via audio telephone only  VIRTUAL VISIT DISCLAIMER    Fran Richardson acknowledges that she has consented to an online visit or consultation  She understands that the online visit is based solely on information provided by her, and that, in the absence of a face-to-face physical evaluation by the physician, the diagnosis she receives is both limited and provisional in terms of accuracy and completeness  This is not intended to replace a full medical face-to-face evaluation by the physician  Fran Richardson understands and accepts these terms      Adolfo Mayo MD  03/15/21  10:34 AM

## 2021-03-16 LAB — SARS-COV-2 RNA RESP QL NAA+PROBE: NEGATIVE

## 2021-03-16 NOTE — RESULT ENCOUNTER NOTE
You tested negative for COVID-19  Please continue supportive management with over the counter cough medication  If you would like please schedule a follow up appointment if you have any questions  Thank you

## 2021-04-14 ENCOUNTER — OFFICE VISIT (OUTPATIENT)
Dept: OBGYN CLINIC | Facility: CLINIC | Age: 26
End: 2021-04-14
Payer: COMMERCIAL

## 2021-04-14 VITALS
SYSTOLIC BLOOD PRESSURE: 110 MMHG | BODY MASS INDEX: 33.14 KG/M2 | WEIGHT: 168.8 LBS | DIASTOLIC BLOOD PRESSURE: 60 MMHG | HEIGHT: 60 IN

## 2021-04-14 DIAGNOSIS — N63.0 BREAST LUMP IN FEMALE: ICD-10-CM

## 2021-04-14 DIAGNOSIS — Z80.3 FAMILY HISTORY OF BREAST CANCER: ICD-10-CM

## 2021-04-14 DIAGNOSIS — N64.4 BREAST PAIN: ICD-10-CM

## 2021-04-14 PROCEDURE — 99213 OFFICE O/P EST LOW 20 MIN: CPT | Performed by: OBSTETRICS & GYNECOLOGY

## 2021-04-14 RX ORDER — CEPHALEXIN 500 MG/1
500 CAPSULE ORAL EVERY 12 HOURS SCHEDULED
Qty: 10 CAPSULE | Refills: 1 | Status: SHIPPED | OUTPATIENT
Start: 2021-04-14 | End: 2021-04-19

## 2021-04-14 NOTE — PROGRESS NOTES
Assessment/Plan:    Diagnoses and all orders for this visit:    Breast pain  -     US breast right limited (diagnostic); Future  -     cephalexin (KEFLEX) 500 mg capsule; Take 1 capsule (500 mg total) by mouth every 12 (twelve) hours for 5 days    Breast lump in female  -     US breast right limited (diagnostic); Future  -     cephalexin (KEFLEX) 500 mg capsule; Take 1 capsule (500 mg total) by mouth every 12 (twelve) hours for 5 days    Family history of breast cancer         IUD in place    Subjective: breast pain right breast     Patient ID: Jarret Talley is a 31 y o  female  HPI     79-year-old female  1 para 1 here today because of breast lump she found in her right breast showering  Patient states it has discomfort even trying to sleep on that side or trying to hold baby side  She is not currently breastfeeding  She did breastfeed for sure after birth child 8 months ago  She is concerned because she does positive family history of breast cancer maternal grandmother on her well    Review of Systems   Respiratory: Negative  Cardiovascular: Negative  Gastrointestinal: Negative  Genitourinary: Negative  IUD in place   All other systems reviewed and are negative  Objective: no acute distress  /60 (BP Location: Left arm, Patient Position: Sitting, Cuff Size: Standard)   Ht 5' (1 524 m)   Wt 76 6 kg (168 lb 12 8 oz)   BMI 32 97 kg/m²      Physical Exam  Bilateral breast exam in the sitting and supine position with chaperone present,   there is a palpable subareolar firm mass irregular in shape approximately 2 x 2 cm  In her right breast Remainder of the right breast exam was normal   There was no nipple discharge either breast,   left breast was  Completely normal  No other breast masses noted  No supraclavicular or axillary lymphadenopathy noted  No nipple discharge  Reviewed self-breast exam techniques       Disposition     Small 2 x 2 subareolar Irregular mass noted in her right breast      Recommend warm compresses 3 times daily, directions given     Trial of cephalexin 500 mg twice daily for 5 days     Follow-up 1 week she is returning for IUD checkup at that time     Ultrasound of left breast ordered     Please note    In addition to the time spent discussing the findings and results of today's visit and exam, I spent approximately 20  minutes of face-to-face time with the patient, greater than 50% of which was spent in counseling and coordination of care for this patient

## 2021-04-21 ENCOUNTER — OFFICE VISIT (OUTPATIENT)
Dept: OBGYN CLINIC | Facility: CLINIC | Age: 26
End: 2021-04-21
Payer: COMMERCIAL

## 2021-04-21 VITALS
DIASTOLIC BLOOD PRESSURE: 72 MMHG | SYSTOLIC BLOOD PRESSURE: 102 MMHG | HEIGHT: 60 IN | WEIGHT: 169 LBS | BODY MASS INDEX: 33.18 KG/M2

## 2021-04-21 DIAGNOSIS — N63.41 SUBAREOLAR LUMP OF RIGHT BREAST: Primary | ICD-10-CM

## 2021-04-21 PROCEDURE — 3008F BODY MASS INDEX DOCD: CPT | Performed by: OBSTETRICS & GYNECOLOGY

## 2021-04-21 PROCEDURE — 99213 OFFICE O/P EST LOW 20 MIN: CPT | Performed by: OBSTETRICS & GYNECOLOGY

## 2021-04-21 PROCEDURE — 3008F BODY MASS INDEX DOCD: CPT | Performed by: FAMILY MEDICINE

## 2021-04-21 NOTE — PROGRESS NOTES
Assessment/Plan:    Diagnoses and all orders for this visit:    Subareolar lump of right breast  -     US breast right limited (diagnostic); Future        Subjective: here for follow-up     Patient ID: Pravin Chadwick is a 31 y o  female  HPI    40-year-old female  1 para 1 here today for follow-up regarding breast pain and breast lump  She has found approximately 6 weeks ago while showering  States patient states the discomfort bother her when she was trying to sleep her lay on her side  She is not currently breastfeeding and only did breastfeed for sure  After the birth of her child 9 months ago  On her previous exam she was found to have in irregular 2 x 2 cm firm mass in her right breast in the subareolar position  There was no nipple discharge  Suspicious for blocked duct  I did recommend we try her on some antibiotics which she has done she is currently on her 2nd course of cephalexin and has  Noticed improvement with lessening pain symptoms or tenderness  She was also using warm compresses 3 times a day  She will discontinue the antibiotics to completion of these pills avoids breasted relation  She was given order for breast ultrasound which she has not completed  She will complete this now at her earliest convenience  No other new problems or concerns  She does have an IUD in place  No menstrual problems or concerns  Review of Systems   Respiratory: Negative  Cardiovascular: Negative  Gastrointestinal: Negative  Genitourinary: Negative  Neurological: Negative  Psychiatric/Behavioral: Negative  All other systems reviewed and are negative  Objective:  No acute distress  /72 (BP Location: Left arm, Patient Position: Sitting, Cuff Size: Standard)   Ht 5' (1 524 m)   Wt 76 7 kg (169 lb)   Breastfeeding No   BMI 33 01 kg/m²      Physical Exam  Vitals signs reviewed  Exam conducted with a chaperone present     Constitutional:       Appearance: Normal appearance  She is normal weight  Eyes:      Pupils: Pupils are equal, round, and reactive to light  Pulmonary:      Effort: Pulmonary effort is normal       Comments:  Breast exam in the sitting supine position  Previously identifies subareolar mass in the right breast has decreased in size significantly   still slightly tender no surrounding erythema no nipple discharge  Genitourinary:     Comments:  Pelvic exam deferred  Musculoskeletal: Normal range of motion  Skin:     General: Skin is warm and dry  Neurological:      Mental Status: She is alert and oriented to person, place, and time  Psychiatric:         Mood and Affect: Mood normal          Behavior: Behavior normal          Thought Content: Thought content normal          Judgment: Judgment normal             Disposition  Subjective and clinical improvement noted  Patient encouraged to complete breast ultrasound after completing her antibiotics  She will call if symptoms fail to improve completely and will return within the next 6 weeks for annual exam       Please note    In addition to the time spent discussing the findings and results of today's visit and exam, I spent approximately 20  minutes of face-to-face time with the patient, greater than 50% of which was spent in counseling and coordination of care for this patient

## 2021-10-04 ENCOUNTER — OFFICE VISIT (OUTPATIENT)
Dept: OBGYN CLINIC | Facility: CLINIC | Age: 26
End: 2021-10-04
Payer: COMMERCIAL

## 2021-10-04 VITALS — DIASTOLIC BLOOD PRESSURE: 74 MMHG | WEIGHT: 157 LBS | BODY MASS INDEX: 30.66 KG/M2 | SYSTOLIC BLOOD PRESSURE: 108 MMHG

## 2021-10-04 DIAGNOSIS — N93.0 POSTCOITAL BLEEDING: ICD-10-CM

## 2021-10-04 DIAGNOSIS — D64.9 ANEMIA, UNSPECIFIED TYPE: ICD-10-CM

## 2021-10-04 DIAGNOSIS — N92.6 IRREGULAR BLEEDING: Primary | ICD-10-CM

## 2021-10-04 PROBLEM — N94.6 DYSMENORRHEA: Status: ACTIVE | Noted: 2021-10-04

## 2021-10-04 LAB — SL AMB POCT URINE HCG: NORMAL

## 2021-10-04 PROCEDURE — 81025 URINE PREGNANCY TEST: CPT | Performed by: OBSTETRICS & GYNECOLOGY

## 2021-10-04 PROCEDURE — 99214 OFFICE O/P EST MOD 30 MIN: CPT | Performed by: OBSTETRICS & GYNECOLOGY

## 2021-10-22 ENCOUNTER — ULTRASOUND (OUTPATIENT)
Dept: OBGYN CLINIC | Facility: CLINIC | Age: 26
End: 2021-10-22
Payer: COMMERCIAL

## 2021-10-22 ENCOUNTER — OFFICE VISIT (OUTPATIENT)
Dept: OBGYN CLINIC | Facility: CLINIC | Age: 26
End: 2021-10-22
Payer: COMMERCIAL

## 2021-10-22 VITALS — WEIGHT: 156 LBS | BODY MASS INDEX: 30.47 KG/M2 | SYSTOLIC BLOOD PRESSURE: 102 MMHG | DIASTOLIC BLOOD PRESSURE: 68 MMHG

## 2021-10-22 DIAGNOSIS — Z30.431 ENCOUNTER FOR ROUTINE CHECKING OF INTRAUTERINE CONTRACEPTIVE DEVICE (IUD): ICD-10-CM

## 2021-10-22 DIAGNOSIS — N93.0 POSTCOITAL BLEEDING: ICD-10-CM

## 2021-10-22 DIAGNOSIS — N92.6 IRREGULAR BLEEDING: ICD-10-CM

## 2021-10-22 DIAGNOSIS — D64.9 ANEMIA, UNSPECIFIED TYPE: ICD-10-CM

## 2021-10-22 DIAGNOSIS — Z97.5 IUD CONTRACEPTION: Primary | ICD-10-CM

## 2021-10-22 PROCEDURE — 99213 OFFICE O/P EST LOW 20 MIN: CPT | Performed by: OBSTETRICS & GYNECOLOGY

## 2021-10-22 PROCEDURE — 76830 TRANSVAGINAL US NON-OB: CPT | Performed by: OBSTETRICS & GYNECOLOGY

## 2022-03-08 ENCOUNTER — OFFICE VISIT (OUTPATIENT)
Dept: OBGYN CLINIC | Facility: CLINIC | Age: 27
End: 2022-03-08
Payer: MEDICARE

## 2022-03-08 VITALS — WEIGHT: 153.8 LBS | BODY MASS INDEX: 30.04 KG/M2 | DIASTOLIC BLOOD PRESSURE: 74 MMHG | SYSTOLIC BLOOD PRESSURE: 122 MMHG

## 2022-03-08 DIAGNOSIS — Z30.432 ENCOUNTER FOR IUD REMOVAL: ICD-10-CM

## 2022-03-08 DIAGNOSIS — N92.6 IRREGULAR BLEEDING: ICD-10-CM

## 2022-03-08 DIAGNOSIS — Z97.5 IUD CONTRACEPTION: ICD-10-CM

## 2022-03-08 DIAGNOSIS — Z30.011 ENCOUNTER FOR INITIAL PRESCRIPTION OF CONTRACEPTIVE PILLS: Primary | ICD-10-CM

## 2022-03-08 PROCEDURE — 99214 OFFICE O/P EST MOD 30 MIN: CPT | Performed by: OBSTETRICS & GYNECOLOGY

## 2022-03-08 PROCEDURE — 58301 REMOVE INTRAUTERINE DEVICE: CPT | Performed by: OBSTETRICS & GYNECOLOGY

## 2022-03-08 RX ORDER — NORETHINDRONE ACETATE AND ETHINYL ESTRADIOL 1; .02 MG/1; MG/1
1 TABLET ORAL DAILY
Qty: 28 TABLET | Refills: 3 | Status: SHIPPED | OUTPATIENT
Start: 2022-03-08

## 2022-03-08 NOTE — PATIENT INSTRUCTIONS
Menorrhagia   AMBULATORY CARE:   Menorrhagia  is heavy menstrual bleeding for more than 7 days or severe menstrual bleeding for less than 7 days  Your menstrual bleeding and cramping are so heavy that you have trouble doing your usual daily activities  Your monthly period may also occur more often, and you may bleed between periods  Menorrhagia is common in adolescence and around menopause  Common symptoms include the following:   · Soaking a pad or tampon every 1 to 2 hours    · Using both a pad and a tampon    · Waking up at night to change your pad or tampon    · Blood clots with your bleeding for more than 1 day    · Abdominal pain or cramps    Call your local emergency number (911 in the [address]) for any of the following:   · You have chest pain and shortness of breath  · Your heart is fluttering or beating faster than usual for you  Seek care immediately if:   · You feel dizzy when you stand  · You feel confused  · You have severe abdominal pain, nausea, and vomiting  · Your skin or the whites of your eyes turn yellow  Call your doctor or gynecologist if:   · You need to change your pad or tampon more than 1 time per hour, for several hours in a row  · You feel more weak and tired than usual     · You have new coldness in your hands and feet  · You have questions or concerns about your condition or care  Medicines: You may need any of the following:  · Iron supplements  may be given if your blood iron level decreases because of heavy bleeding  · NSAIDs , such as ibuprofen, help decrease swelling, pain, and fever  NSAIDs can cause stomach bleeding or kidney problems in certain people  If you take blood thinner medicine, always ask your healthcare provider if NSAIDs are safe for you  Always read the medicine label and follow directions  · Hormones  help slow or stop your bleeding and make your monthly periods more regular   This medicine may be given as birth control pills or an intrauterine device (IUD)  · Take your medicine as directed  Contact your healthcare provider if you think your medicine is not helping or if you have side effects  Tell him of her if you are allergic to any medicine  Keep a list of the medicines, vitamins, and herbs you take  Include the amounts, and when and why you take them  Bring the list or the pill bottles to follow-up visits  Carry your medicine list with you in case of an emergency  Manage your symptoms:   · Keep a supply of pads or tampons with you at all times  If possible, stay close to a bathroom  · Apply heat  on your abdomen to decrease pain and cramps  You can use a heating pad on a low setting  Apply heat for 20 to 30 minutes every 2 hours for as many days as directed  Follow up with your doctor or gynecologist as directed: You may need regular pelvic exams with Pap smears to monitor your condition  Write down your questions so you remember to ask them during your visits  © [employer] 2022 Information is for End User's use only and may not be sold, redistributed or otherwise used for commercial purposes  All illustrations and images included in CareNotes® are the copyrighted property of [employer] D A M [employer] Inc  or [facility][note 3] [employer]  The above information is an  only  It is not intended as medical advice for individual conditions or treatments  Talk to your doctor, nurse or pharmacist before following any medical regimen to see if it is safe and effective for you  Birth Control Pills   WHAT YOU NEED TO KNOW:   What are birth control pills? Birth control pills are also called oral contraceptives, or the pill  It is medicine that helps prevent pregnancy by stopping ovulation  Ovulation is when the ovaries make and release an egg cell each month  If this egg gets fertilized by sperm, pregnancy occurs  You will need to take the pill at the same time every day   Your healthcare provider will tell you when to start taking the pill  You will also be told what to do if you miss a dose  Instructions will depend on the kind of birth control pills you are taking  What are the different kinds of birth control pills? Some kinds are taken for 21 days in a row, followed by 7 days of placebo (no hormones) pills  Other kinds are taken for 24 days followed by 4 days of placebos  Each kind has a certain amount of female hormones  Your provider will decide on the kind that is best for you based on your age and other health conditions  What may be done before I can start taking birth control pills? You need to see your healthcare provider to get a prescription  Any of the following may be done before your healthcare provider gives you a prescription:  · Your healthcare provider will ask about diseases and illnesses you have had in the past  Your provider will check your risk for blood clots, heart conditions, or stroke  Tell your provider if you had gastric bypass surgery  This surgery can affect the way your body absorbs medicines such as birth control pills  · Your provider will also check your blood pressure, and may do a breast and pelvic exam  A Pap smear may also be done during the pelvic exam  This is a test to make sure you do not have abnormal changes on your cervix  You may need other tests, such as a urine test to make sure you are not pregnant  · Your provider will ask if you take any medicines and if you smoke  Smoking increases your risk for stroke, heart attack, or a blood clot in your lungs  If you smoke, you should not take certain kinds of birth control pills  What are the advantages of birth control pills? When birth control pills are used correctly, the chances of getting pregnant are very low  Birth control pills may help decrease bleeding and pain during your monthly period  They may also help prevent cancer of the uterus and ovaries  What are the disadvantages of birth control pills?   You may have sudden changes in your mood or feelings while you take birth control pills  You may have nausea and a decreased sex drive  You may have an increased appetite and rapid weight gain  You may also have bleeding in between periods, less frequent periods, vaginal dryness, and breast pain  Birth control pills will not protect you from sexually transmitted infections  Rarely, some birth control pills can increase your risk for a blood clot  This may become life-threatening  What should I do if I decide I want to get pregnant? If you are planning to have a baby, ask your healthcare provider when you may stop taking your birth control pills  It may take some time for you to start ovulating again  Ask your healthcare provider for more information about pregnancy after birth control pills  When should I start taking birth control pills after I have a baby? If you are not breastfeeding, you may start taking birth control pills 3 weeks after you give birth  You may be able to take certain types of birth control pills if you are breastfeeding  These pills can be started from 6 weeks to 6 months after you give birth  Ask your healthcare provider for more information about when to start taking birth control pills after you give birth  What do I need to know about birth control pills and menopause? · Talk with your healthcare provider if you want to take birth control pills around menopause  · Around age 39, you will enter into perimenopause  This means your hormone levels are dropping and you are ovulating less often  You can still become pregnant during this time  The risk for problems, such as miscarriage, are higher if you become pregnant after age 39  Birth control pills will prevent pregnancy, and may also help prevent or relieve some signs and symptoms of menopause  Examples are hot flashes and mood swings  · Your provider will do tests when you are around age 48  The tests may show that you are in menopause  If the tests do not show menopause for sure, you may be able to continue taking the pill up to age 54  The decision will depend on your health and if you have any medical conditions, such as a blood clot  Call your local emergency number (911 in the [address]) for any of the following:   · You have any of the following signs of a stroke:      ? Numbness or drooping on one side of your face     ? Weakness in an arm or leg    ? Confusion or difficulty speaking    ? Dizziness, a severe headache, or vision loss    · You feel lightheaded, short of breath, and have chest pain  · You cough up blood  When should I seek immediate care? · Your arm or leg feels warm, tender, and painful  It may look swollen and red  · You have severe pain, numbness, or swelling in your arms or legs  When should I call my doctor? · You have forgotten to take a birth control pill  · You have mood changes, such as depression, since starting birth control pills  · You have nausea or are vomiting  · You have severe abdominal pain  · You missed a period and have questions or concerns about being pregnant  · You still have bleeding 4 months after taking birth control pills correctly  · You have questions or concerns about your condition or care  CARE AGREEMENT:   You have the right to help plan your care  Learn about your health condition and how it may be treated  Discuss treatment options with your healthcare providers to decide what care you want to receive  You always have the right to refuse treatment  The above information is an  only  It is not intended as medical advice for individual conditions or treatments  Talk to your doctor, nurse or pharmacist before following any medical regimen to see if it is safe and effective for you  © Copyright [employer] 2022 Information is for End User's use only and may not be sold, redistributed or otherwise used for commercial purposes   All illustrations and images included in CareNotes® are the copyrighted property of A [employer] A M , Inc  or Nneka Mayorga

## 2022-03-08 NOTE — PROGRESS NOTES
Iud removal    Date/Time: 3/8/2022 1:30 PM  Performed by: James Bolton MD  Authorized by: James Bolton MD   Universal Protocol:  Consent: Verbal consent obtained  Written consent obtained  Risks and benefits: risks, benefits and alternatives were discussed  Consent given by: patient  Time out: Immediately prior to procedure a "time out" was called to verify the correct patient, procedure, equipment, support staff and site/side marked as required  Timeout called at: 3/8/2022 1:30 PM   Patient understanding: patient states understanding of the procedure being performed  Patient consent: the patient's understanding of the procedure matches consent given  Procedure consent: procedure consent matches procedure scheduled  Relevant documents: relevant documents present and verified  Patient identity confirmed: verbally with patient      Procedure:     Removed with no complications: yes      Other reason for removal:  Irregular bleeding, malposition  Comments:      Patient tolerated procedure well  Pelvic rest recommended for the next few days  Counseled about possible irregular bleeding  Follow-up as scheduled

## 2022-03-08 NOTE — PROGRESS NOTES
Assessment/Plan   Diagnoses and all orders for this visit:    Encounter for initial prescription of contraceptive pills  -     norethindrone-ethinyl estradiol (Loestrin 1/20, 21,) 1-20 MG-MCG per tablet; Take 1 tablet by mouth daily    Encounter for IUD removal  -     Iud removal    Irregular bleeding    IUD contraception    1  Mirena IUD removal-was placed 3/10/21 by Dr Garcia Gordon  It was found in the lower in uterine segment ultrasound from October 2021  Her bleeding and cramping has gotten worse  She never did do lab testing  She notes bleeding with sex and with orgasm and with vigorous movement  She does note some significant cramping as well  We had a long discussion about this  She wished to have it removed  Risk and benefits were discussed  IUD was removed with ring forceps without difficulty  Patient tolerated well  Counseled about possible irregular bleeding  To call or return with any issues  2  Contraception-was on Loestrin 1/20 prior to the birth of her child about 18 months ago  She did well on this  Electronic prescription for 1 pack refill 3 was sent a local pharmacy  The patient was counseled about risks of birth control pills  She denies any contraindications, including risks of blood clots, liver disease, hormone dependent tumors, migraine headaches, or hypertension  ACHES symptoms were discussed  All questions were answered  The patient will start the pill as directed, and return in 3 months time for pill check with yearly exam   3  History of anemia-most recent hemoglobin 11 1   4  Previous right breast finding-no current concerns  5  History of hormonal side effects-denies any current symptoms  She states she did well on this pill, she will call with any issues  6  Postcoital bleeding-IUD removed as noted above  Should she continue with this, would recommend evaluation  Otherwise, follow-up 2-3 months time for yearly exam with pill check to be done at that time      Subjective Patient ID: Ermias Oliveira is a 31 y o  female      Vitals:    22 1314   BP: 122/74     HPI    The following portions of the patient's history were reviewed and updated as appropriate: allergies, current medications, past family history, past medical history, past social history, past surgical history and problem list   Past Medical History:   Diagnosis Date    Abnormal Pap smear of cervix     Anemia     Asthma     Gonorrhea     Migraine     tylenol    Miscarriage     Nexplanon removal     last assessed 17    Urinary tract infection     Varicella     patient had chicken pox     Past Surgical History:   Procedure Laterality Date    TONSILLECTOMY       OB History    Para Term  AB Living   4 3 3   1 3   SAB IAB Ectopic Multiple Live Births   1     0 3      # Outcome Date GA Lbr Ronald/2nd Weight Sex Delivery Anes PTL Lv   4 Term 20 38w6d  2810 g (6 lb 3 1 oz) M Vag-Spont EPI N MARGO   3 Term 17 38w2d / 00:36 3260 g (7 lb 3 oz) M Vag-Spont EPI N MARGO   2 Term 13 40w0d   M    MARGO   1 SAB                Current Outpatient Medications:     albuterol (PROVENTIL HFA,VENTOLIN HFA) 90 mcg/act inhaler, Inhale 2 puffs every 6 (six) hours as needed for wheezing, Disp: , Rfl:     benzonatate (TESSALON PERLES) 100 mg capsule, Take 1 capsule (100 mg total) by mouth 3 (three) times a day as needed for cough (Patient not taking: Reported on 2021), Disp: 20 capsule, Rfl: 0    ferrous sulfate 324 (65 Fe) mg, Take 1 tablet (324 mg total) by mouth daily before breakfast, Disp: 90 tablet, Rfl: 1    norethindrone-ethinyl estradiol (Loestrin ,) 1-20 MG-MCG per tablet, Take 1 tablet by mouth daily, Disp: 28 tablet, Rfl: 3    ondansetron (ZOFRAN-ODT) 4 mg disintegrating tablet, Take 1 tablet (4 mg total) by mouth every 6 (six) hours as needed for nausea or vomiting (Patient not taking: Reported on 2021), Disp: 20 tablet, Rfl: 0    Prenatal Vit w/Aj-Ppotxlhep-GV (PNV PO), Take 1 tablet by mouth daily (Patient not taking: Reported on 3/8/2022 ), Disp: , Rfl:   Allergies   Allergen Reactions    Fruit Extracts      Category: Allergy;  Annotation - [id number]: pomegranite lotion has skin reaction, pomegranite fruit has itching and swelling     Social History     Socioeconomic History    Marital status: Single     Spouse name: Not on file    Number of children: 1    Years of education: Not on file    Highest education level: Not on file   Occupational History    Not on file   Tobacco Use    Smoking status: Never Smoker    Smokeless tobacco: Never Used   Vaping Use    Vaping Use: Never used   Substance and Sexual Activity    Alcohol use: Not Currently     Comment: social    Drug use: No    Sexual activity: Yes     Partners: Male     Birth control/protection: Condom Male, Injection   Other Topics Concern    Not on file   Social History Narrative    Always uses seat belt    Caffeine use    No Sabianist beliefs     Social Determinants of Health     Financial Resource Strain: Not on file   Food Insecurity: Not on file   Transportation Needs: Not on file   Physical Activity: Not on file   Stress: Not on file   Social Connections: Not on file   Intimate Partner Violence: Not on file   Housing Stability: Not on file     Family History   Problem Relation Age of Onset    Asthma Mother     Asthma Father     Cancer Family         mother and father side    No Known Problems Son     No Known Problems Half-Sister     No Known Problems Half-Sister     No Known Problems Half-Sister     Asthma Half-Brother     No Known Problems Half-Brother     No Known Problems Son     Breast cancer Family        Review of Systems    Objective   Physical Exam  OBGyn Exam     Objective      /74 (BP Location: Left arm, Patient Position: Sitting, Cuff Size: Adult)   Wt 69 8 kg (153 lb 12 8 oz)   BMI 30 04 kg/m²     General:   alert and oriented, in no acute distress   Neck:    Breast: Heart:    Lungs:    Abdomen: soft, non-tender, without masses or organomegaly   Vulva: normal   Vagina: Without erythema or lesions or discharge  Normal   Cervix: Without lesions or discharge or cervicitis    No Cervical motion tenderness   Uterus: top normal size, anteverted, non-tender   Adnexa: no mass, fullness, tenderness   Rectum: deferred    Psych:  Normal mood and affect   Skin:  Without obvious lesions   Eyes: symmetric, with normal movements and reactivity   Musculoskeletal:  Normal muscle tone and movements appreciated

## 2022-03-27 ENCOUNTER — HOSPITAL ENCOUNTER (EMERGENCY)
Facility: HOSPITAL | Age: 27
Discharge: HOME | End: 2022-03-27
Attending: EMERGENCY MEDICINE
Payer: MEDICARE

## 2022-03-27 ENCOUNTER — APPOINTMENT (EMERGENCY)
Dept: CT IMAGING | Facility: HOSPITAL | Age: 27
End: 2022-03-27
Payer: MEDICARE

## 2022-03-27 VITALS
DIASTOLIC BLOOD PRESSURE: 59 MMHG | SYSTOLIC BLOOD PRESSURE: 107 MMHG | HEART RATE: 64 BPM | RESPIRATION RATE: 16 BRPM | OXYGEN SATURATION: 100 % | BODY MASS INDEX: 30.05 KG/M2 | WEIGHT: 153.88 LBS | TEMPERATURE: 98.3 F

## 2022-03-27 DIAGNOSIS — K04.7 DENTAL INFECTION: ICD-10-CM

## 2022-03-27 DIAGNOSIS — K02.9 DENTAL CARIES: ICD-10-CM

## 2022-03-27 DIAGNOSIS — L03.211 FACIAL CELLULITIS: Primary | ICD-10-CM

## 2022-03-27 LAB
ALBUMIN SERPL BCP-MCNC: 3.4 G/DL (ref 3.5–5)
ALP SERPL-CCNC: 61 U/L (ref 46–116)
ALT SERPL W P-5'-P-CCNC: 21 U/L (ref 12–78)
ANION GAP SERPL CALCULATED.3IONS-SCNC: 7 MMOL/L (ref 4–13)
AST SERPL W P-5'-P-CCNC: 6 U/L (ref 5–45)
BASOPHILS # BLD AUTO: 0.03 THOUSANDS/ΜL (ref 0–0.1)
BASOPHILS NFR BLD AUTO: 0 % (ref 0–1)
BILIRUB SERPL-MCNC: 0.35 MG/DL (ref 0.2–1)
BUN SERPL-MCNC: 11 MG/DL (ref 5–25)
CALCIUM ALBUM COR SERPL-MCNC: 9.2 MG/DL (ref 8.3–10.1)
CALCIUM SERPL-MCNC: 8.7 MG/DL (ref 8.3–10.1)
CHLORIDE SERPL-SCNC: 107 MMOL/L (ref 100–108)
CO2 SERPL-SCNC: 25 MMOL/L (ref 21–32)
CREAT SERPL-MCNC: 0.67 MG/DL (ref 0.6–1.3)
EOSINOPHIL # BLD AUTO: 0.24 THOUSAND/ΜL (ref 0–0.61)
EOSINOPHIL NFR BLD AUTO: 2 % (ref 0–6)
ERYTHROCYTE [DISTWIDTH] IN BLOOD BY AUTOMATED COUNT: 14.8 % (ref 11.6–15.1)
GFR SERPL CREATININE-BSD FRML MDRD: 120 ML/MIN/1.73SQ M
GLUCOSE SERPL-MCNC: 86 MG/DL (ref 65–140)
HCT VFR BLD AUTO: 37.3 % (ref 34.8–46.1)
HGB BLD-MCNC: 12.4 G/DL (ref 11.5–15.4)
IMM GRANULOCYTES # BLD AUTO: 0.04 THOUSAND/UL (ref 0–0.2)
IMM GRANULOCYTES NFR BLD AUTO: 0 % (ref 0–2)
LYMPHOCYTES # BLD AUTO: 2 THOUSANDS/ΜL (ref 0.6–4.47)
LYMPHOCYTES NFR BLD AUTO: 18 % (ref 14–44)
MCH RBC QN AUTO: 27.9 PG (ref 26.8–34.3)
MCHC RBC AUTO-ENTMCNC: 33.2 G/DL (ref 31.4–37.4)
MCV RBC AUTO: 84 FL (ref 82–98)
MONOCYTES # BLD AUTO: 0.55 THOUSAND/ΜL (ref 0.17–1.22)
MONOCYTES NFR BLD AUTO: 5 % (ref 4–12)
NEUTROPHILS # BLD AUTO: 8.07 THOUSANDS/ΜL (ref 1.85–7.62)
NEUTS SEG NFR BLD AUTO: 75 % (ref 43–75)
NRBC BLD AUTO-RTO: 0 /100 WBCS
PLATELET # BLD AUTO: 386 THOUSANDS/UL (ref 149–390)
PMV BLD AUTO: 9.9 FL (ref 8.9–12.7)
POTASSIUM SERPL-SCNC: 3.8 MMOL/L (ref 3.5–5.3)
PROT SERPL-MCNC: 7.1 G/DL (ref 6.4–8.2)
RBC # BLD AUTO: 4.44 MILLION/UL (ref 3.81–5.12)
SODIUM SERPL-SCNC: 139 MMOL/L (ref 136–145)
WBC # BLD AUTO: 10.93 THOUSAND/UL (ref 4.31–10.16)

## 2022-03-27 PROCEDURE — 80053 COMPREHEN METABOLIC PANEL: CPT | Performed by: PHYSICIAN ASSISTANT

## 2022-03-27 PROCEDURE — G1004 CDSM NDSC: HCPCS

## 2022-03-27 PROCEDURE — 96365 THER/PROPH/DIAG IV INF INIT: CPT

## 2022-03-27 PROCEDURE — 99284 EMERGENCY DEPT VISIT MOD MDM: CPT | Performed by: PHYSICIAN ASSISTANT

## 2022-03-27 PROCEDURE — 99284 EMERGENCY DEPT VISIT MOD MDM: CPT

## 2022-03-27 PROCEDURE — 96361 HYDRATE IV INFUSION ADD-ON: CPT

## 2022-03-27 PROCEDURE — 70487 CT MAXILLOFACIAL W/DYE: CPT

## 2022-03-27 PROCEDURE — 85025 COMPLETE CBC W/AUTO DIFF WBC: CPT | Performed by: PHYSICIAN ASSISTANT

## 2022-03-27 PROCEDURE — 36415 COLL VENOUS BLD VENIPUNCTURE: CPT | Performed by: PHYSICIAN ASSISTANT

## 2022-03-27 RX ORDER — CLINDAMYCIN HYDROCHLORIDE 300 MG/1
300 CAPSULE ORAL 4 TIMES DAILY
Qty: 40 CAPSULE | Refills: 0 | Status: SHIPPED | OUTPATIENT
Start: 2022-03-27 | End: 2022-04-06

## 2022-03-27 RX ORDER — ONDANSETRON 4 MG/1
4 TABLET, FILM COATED ORAL EVERY 8 HOURS PRN
Qty: 8 TABLET | Refills: 0 | Status: SHIPPED | OUTPATIENT
Start: 2022-03-27

## 2022-03-27 RX ORDER — ONDANSETRON 2 MG/ML
4 INJECTION INTRAMUSCULAR; INTRAVENOUS ONCE
Status: DISCONTINUED | OUTPATIENT
Start: 2022-03-27 | End: 2022-03-27 | Stop reason: HOSPADM

## 2022-03-27 RX ORDER — CLINDAMYCIN PHOSPHATE 600 MG/50ML
600 INJECTION INTRAVENOUS ONCE
Status: COMPLETED | OUTPATIENT
Start: 2022-03-27 | End: 2022-03-27

## 2022-03-27 RX ORDER — CLINDAMYCIN HYDROCHLORIDE 300 MG/1
300 CAPSULE ORAL 4 TIMES DAILY
Qty: 40 CAPSULE | Refills: 0 | Status: SHIPPED | OUTPATIENT
Start: 2022-03-27 | End: 2022-03-27 | Stop reason: SDUPTHER

## 2022-03-27 RX ADMIN — SODIUM CHLORIDE 1000 ML: 0.9 INJECTION, SOLUTION INTRAVENOUS at 10:56

## 2022-03-27 RX ADMIN — CLINDAMYCIN PHOSPHATE 600 MG: 600 INJECTION, SOLUTION INTRAVENOUS at 12:54

## 2022-03-27 RX ADMIN — IOHEXOL 85 ML: 350 INJECTION, SOLUTION INTRAVENOUS at 11:34

## 2022-03-27 NOTE — ED PROVIDER NOTES
History  Chief Complaint   Patient presents with    Dental Pain     Pt c/o left upper broke tooth for some time now, states that this morning she woke up and the left side of her face was swollen   Facial Swelling     Patient is a 23-year-old female no significant past medical history presents for evaluation left facial swelling  She states that she has known dental cavities and of broken tooth to left upper side  She states it has been there for a few years but does not usually give her pain because the tooth is dead  She states that she did not have dental insurance so she was not able to get this fixed  She states that she finally got dental insurance and was planning to get the tooth removed however she woke up this morning with left-sided facial swelling  She states that she feels gum swelling and tenderness as well  She denies other fever, chills, chest pain, shortness of breath, abdominal pain, difficulty swallowing or sore throat, neck pain or stiffness, headache  She states that her menstrual periods are irregular due to the birth control that she is on  Prior to Admission Medications   Prescriptions Last Dose Informant Patient Reported? Taking?    Prenatal Vit w/Br-Sgnbkxarc-YM (PNV PO)  Self Yes No   Sig: Take 1 tablet by mouth daily   Patient not taking: Reported on 3/8/2022    albuterol (PROVENTIL HFA,VENTOLIN HFA) 90 mcg/act inhaler  Self Yes No   Sig: Inhale 2 puffs every 6 (six) hours as needed for wheezing   benzonatate (TESSALON PERLES) 100 mg capsule   No No   Sig: Take 1 capsule (100 mg total) by mouth 3 (three) times a day as needed for cough   Patient not taking: Reported on 4/14/2021   ferrous sulfate 324 (65 Fe) mg   No No   Sig: Take 1 tablet (324 mg total) by mouth daily before breakfast   norethindrone-ethinyl estradiol (Loestrin 1/20, 21,) 1-20 MG-MCG per tablet   No No   Sig: Take 1 tablet by mouth daily   ondansetron (ZOFRAN-ODT) 4 mg disintegrating tablet   No No Sig: Take 1 tablet (4 mg total) by mouth every 6 (six) hours as needed for nausea or vomiting   Patient not taking: Reported on 4/14/2021      Facility-Administered Medications: None       Past Medical History:   Diagnosis Date    Abnormal Pap smear of cervix     Anemia     Asthma     Gonorrhea     Migraine     tylenol    Miscarriage     Nexplanon removal     last assessed 02/07/17    Urinary tract infection     Varicella     patient had chicken pox       Past Surgical History:   Procedure Laterality Date    TONSILLECTOMY         Family History   Problem Relation Age of Onset    Asthma Mother     Asthma Father     Cancer Family         mother and father side    No Known Problems Son     No Known Problems Half-Sister     No Known Problems Half-Sister     No Known Problems Half-Sister     Asthma Half-Brother     No Known Problems Half-Brother     No Known Problems Son     Breast cancer Family      I have reviewed and agree with the history as documented  E-Cigarette/Vaping    E-Cigarette Use Never User      E-Cigarette/Vaping Substances    Nicotine No     THC No     CBD No     Flavoring No     Other No     Unknown No      Social History     Tobacco Use    Smoking status: Never Smoker    Smokeless tobacco: Never Used   Vaping Use    Vaping Use: Never used   Substance Use Topics    Alcohol use: Yes     Comment: social    Drug use: No       Review of Systems   Constitutional: Negative for chills and fever  HENT: Positive for dental problem and facial swelling  Respiratory: Negative for shortness of breath  Cardiovascular: Negative for chest pain  Gastrointestinal: Negative for abdominal pain, diarrhea, nausea and vomiting  Musculoskeletal: Negative for neck pain and neck stiffness  Skin: Negative for rash  Neurological: Negative for headaches  All other systems reviewed and are negative  Physical Exam  Physical Exam  Vitals and nursing note reviewed  Constitutional:       General: She is not in acute distress  Appearance: Normal appearance  She is normal weight  She is not ill-appearing, toxic-appearing or diaphoretic  HENT:      Head: Normocephalic and atraumatic  Right Ear: External ear normal       Left Ear: External ear normal       Nose: Nose normal       Mouth/Throat:      Lips: Pink  No lesions  Mouth: Mucous membranes are moist       Dentition: Abnormal dentition  Dental caries present  Pharynx: Oropharynx is clear  Uvula midline  No pharyngeal swelling, oropharyngeal exudate, posterior oropharyngeal erythema or uvula swelling  Comments: Poor dentition throughout with multiple caries and broken tooth to the left upper molar  There is gum swelling without fluctuant/drainable abscess  There is overlying facial swelling and tenderness to the left cheek  No trismus  Patient handles oral secretions well without difficulty  No tongue elevation or sub lingual tenderness  Eyes:      Conjunctiva/sclera: Conjunctivae normal    Cardiovascular:      Rate and Rhythm: Normal rate and regular rhythm  Heart sounds: Normal heart sounds  No murmur heard  Pulmonary:      Effort: Pulmonary effort is normal  No respiratory distress  Breath sounds: Normal breath sounds  No stridor  No wheezing or rhonchi  Abdominal:      General: Abdomen is flat  There is no distension  Musculoskeletal:         General: Normal range of motion  Cervical back: Normal range of motion and neck supple  No rigidity or tenderness  Lymphadenopathy:      Cervical: No cervical adenopathy  Neurological:      Mental Status: She is alert     Psychiatric:         Mood and Affect: Mood normal          Vital Signs  ED Triage Vitals   Temperature Pulse Respirations Blood Pressure SpO2   03/27/22 1013 03/27/22 1013 03/27/22 1013 03/27/22 1013 03/27/22 1013   98 3 °F (36 8 °C) 77 18 100/74 97 %      Temp Source Heart Rate Source Patient Position - Orthostatic VS BP Location FiO2 (%)   03/27/22 1013 03/27/22 1013 03/27/22 1156 03/27/22 1156 --   Oral Monitor Lying Right arm       Pain Score       03/27/22 1013       2           Vitals:    03/27/22 1013 03/27/22 1156   BP: 100/74 107/59   Pulse: 77 64   Patient Position - Orthostatic VS:  Lying         Visual Acuity      ED Medications  Medications   ondansetron (ZOFRAN) injection 4 mg (has no administration in time range)   sodium chloride 0 9 % bolus 1,000 mL (1,000 mL Intravenous New Bag 3/27/22 1056)   iohexol (OMNIPAQUE) 350 MG/ML injection (SINGLE-DOSE) 85 mL (85 mL Intravenous Given 3/27/22 1134)   clindamycin (CLEOCIN) IVPB (premix in dextrose) 600 mg 50 mL (600 mg Intravenous New Bag 3/27/22 1254)       Diagnostic Studies  Results Reviewed     Procedure Component Value Units Date/Time    Comprehensive metabolic panel [[id number]]  (Abnormal) Collected: 03/27/22 1056    Lab Status: Final result Specimen: Blood from Arm, Left Updated: 03/27/22 1124     Sodium 139 mmol/L      Potassium 3 8 mmol/L      Chloride 107 mmol/L      CO2 25 mmol/L      ANION GAP 7 mmol/L      BUN 11 mg/dL      Creatinine 0 67 mg/dL      Glucose 86 mg/dL      Calcium 8 7 mg/dL      Corrected Calcium 9 2 mg/dL      AST 6 U/L      ALT 21 U/L      Alkaline Phosphatase 61 U/L      Total Protein 7 1 g/dL      Albumin 3 4 g/dL      Total Bilirubin 0 35 mg/dL      eGFR 120 ml/min/1 73sq m     Narrative:      [place] guidelines for Chronic Kidney Disease (CKD):     Stage 1 with normal or high GFR (GFR > 90 mL/min/1 73 square meters)    Stage 2 Mild CKD (GFR = 60-89 mL/min/1 73 square meters)    Stage 3A Moderate CKD (GFR = 45-59 mL/min/1 73 square meters)    Stage 3B Moderate CKD (GFR = 30-44 mL/min/1 73 square meters)    Stage 4 Severe CKD (GFR = 15-29 mL/min/1 73 square meters)    Stage 5 End Stage CKD (GFR <15 mL/min/1 73 square meters)  Note: GFR calculation is accurate only with a steady state creatinine    CBC and differential [[id number]]  (Abnormal) Collected: 03/27/22 1056    Lab Status: Final result Specimen: Blood from Arm, Left Updated: 03/27/22 1106     WBC 10 93 Thousand/uL      RBC 4 44 Million/uL      Hemoglobin 12 4 g/dL      Hematocrit 37 3 %      MCV 84 fL      MCH 27 9 pg      MCHC 33 2 g/dL      RDW 14 8 %      MPV 9 9 fL      Platelets 689 Thousands/uL      nRBC 0 /100 WBCs      Neutrophils Relative 75 %      Immat GRANS % 0 %      Lymphocytes Relative 18 %      Monocytes Relative 5 %      Eosinophils Relative 2 %      Basophils Relative 0 %      Neutrophils Absolute 8 07 Thousands/µL      Immature Grans Absolute 0 04 Thousand/uL      Lymphocytes Absolute 2 00 Thousands/µL      Monocytes Absolute 0 55 Thousand/µL      Eosinophils Absolute 0 24 Thousand/µL      Basophils Absolute 0 03 Thousands/µL                  CT facial bones with contrast   Final Result by Tamiko Patel MD (03/27 1157)         1  Dental disease as described  2   Mild-moderate left facial cellulitis likely due to underlying dental disease involving tooth 14 as detailed above  The study was marked in [facility] for immediate notification  Workstation performed: [id number]                    Procedures  Procedures         ED Course                                             MDM  Number of Diagnoses or Management Options  Dental caries  Dental infection  Facial cellulitis  Diagnosis management comments: Will check basic labs and CT scan facial bones to assess for dental infection/abscess  Will give IV fluids  Lab evaluation with mild leukocytosis at 10 93  Remainder of lab evaluation unremarkable  CT facial bones with contrast shows 1  Dental disease as described  2   Mild-moderate left facial cellulitis likely due to underlying dental disease involving tooth 14 as detailed above    Discussed with all mass on call Dr Sam Arriaza- patient can follow-up outpatient in the office tomorrow-instructed to call the office at 8:00 a m  Tomorrow to set up an appointment     Will give dose of IV clindamycin here and sent home with clindamycin for home  Discussed all results and plan with patient who is agreeable to plan  Nurse informed me that patient was having some nausea after IV clindamycin  Patient did not eat yet today  She was given a dose of Zofran here in the ED prior to discharge to help with nausea  She is eating and drinking without difficulty  Discussed that clindamycin can be hard on the stomach and she should eat with taking the medication  Also advised on taking probiotic/yogurt with this medication  She is given a short course of Zofran to use as needed for persistent nausea or vomiting  Patient states she has been on Zofran in the past during her pregnancy  She is well-appearing, nontoxic and in no acute distress at time of discharge  Amount and/or Complexity of Data Reviewed  Clinical lab tests: ordered and reviewed  Tests in the radiology section of CPT®: ordered and reviewed    Patient Progress  Patient progress: stable      Disposition  Final diagnoses:   Facial cellulitis   Dental caries   Dental infection     Time reflects when diagnosis was documented in both MDM as applicable and the Disposition within this note     Time User Action Codes Description Comment    3/27/2022  1:30 PM Tiana Cea Add [M86 008] Facial cellulitis     3/27/2022  1:30 PM Tiana Cea Add [K02 9] Dental caries     3/27/2022  1:30 PM Tiana Cea Add [K04 7] Dental infection       ED Disposition     ED Disposition Condition Date/Time Comment    Discharge Stable Sun Mar 27, 2022  1:30 PM Simon Smith discharge to home/self care  Follow-up Information     Follow up With Specialties Details Why Contact Info Additional [address]  for Oral and Maxillofacial Surgery [facility]  Call  Call tomorrow morning at 8a m  For an appointment    Inform the office you were seen in the ER and require follow-up appointment [address] [address] Emergency Department Emergency Medicine  If symptoms worsen [facility] [id number]  [address] [facility] Emergency Department, [address] [facility] [place], [place], [address]          Patient's Medications   Discharge Prescriptions    CLINDAMYCIN (CLEOCIN) 300 MG CAPSULE    Take 1 capsule (300 mg total) by mouth 4 (four) times a day for 10 days       Start Date: 3/27/2022 End Date: 4/6/2022       Order Dose: 300 mg       Quantity: 40 capsule    Refills: 0    ONDANSETRON (ZOFRAN) 4 MG TABLET    Take 1 tablet (4 mg total) by mouth every 8 (eight) hours as needed for nausea or vomiting       Start Date: 3/27/2022 End Date: --       Order Dose: 4 mg       Quantity: 8 tablet    Refills: 0       No discharge procedures on file      PDMP Review     None          ED Provider  Electronically Signed by           Khushbu Burdick PA-C  03/27/22 4363

## 2022-03-27 NOTE — ED NOTES
Pt c/o of nausea and requesting medication  PAMoisesC made aware at this time        Flower Carty  03/27/22 2755

## 2022-03-30 ENCOUNTER — APPOINTMENT (OUTPATIENT)
Dept: LAB | Facility: HOSPITAL | Age: 27
End: 2022-03-30
Attending: OBSTETRICS & GYNECOLOGY
Payer: COMMERCIAL

## 2022-03-30 DIAGNOSIS — Z32.00 POSSIBLE PREGNANCY, NOT CONFIRMED: ICD-10-CM

## 2022-03-30 LAB — B-HCG SERPL-ACNC: 318.1 MIU/ML

## 2022-03-30 PROCEDURE — 36415 COLL VENOUS BLD VENIPUNCTURE: CPT

## 2022-03-30 PROCEDURE — 84702 CHORIONIC GONADOTROPIN TEST: CPT

## 2022-04-03 ENCOUNTER — APPOINTMENT (EMERGENCY)
Dept: ULTRASOUND IMAGING | Facility: HOSPITAL | Age: 27
End: 2022-04-03
Payer: MEDICARE

## 2022-04-03 ENCOUNTER — HOSPITAL ENCOUNTER (EMERGENCY)
Facility: HOSPITAL | Age: 27
Discharge: HOME | End: 2022-04-03
Attending: EMERGENCY MEDICINE | Admitting: EMERGENCY MEDICINE
Payer: MEDICARE

## 2022-04-03 ENCOUNTER — NURSE TRIAGE (OUTPATIENT)
Dept: OTHER | Facility: OTHER | Age: 27
End: 2022-04-03

## 2022-04-03 VITALS
SYSTOLIC BLOOD PRESSURE: 119 MMHG | DIASTOLIC BLOOD PRESSURE: 61 MMHG | RESPIRATION RATE: 16 BRPM | OXYGEN SATURATION: 97 % | WEIGHT: 152.56 LBS | HEART RATE: 82 BPM | BODY MASS INDEX: 29.79 KG/M2 | TEMPERATURE: 99 F

## 2022-04-03 DIAGNOSIS — R10.9 ABDOMINAL CRAMPS: ICD-10-CM

## 2022-04-03 DIAGNOSIS — O20.0 THREATENED MISCARRIAGE: Primary | ICD-10-CM

## 2022-04-03 DIAGNOSIS — O46.90 VAGINAL BLEEDING IN PREGNANCY: ICD-10-CM

## 2022-04-03 LAB
ABO GROUP BLD: NORMAL
B-HCG SERPL-ACNC: 4079.8 MIU/ML
BACTERIA UR QL AUTO: ABNORMAL /HPF
BASOPHILS # BLD AUTO: 0.05 THOUSANDS/ΜL (ref 0–0.1)
BASOPHILS NFR BLD AUTO: 1 % (ref 0–1)
BILIRUB UR QL STRIP: NEGATIVE
BLD GP AB SCN SERPL QL: NEGATIVE
CLARITY UR: CLEAR
COLOR UR: YELLOW
EOSINOPHIL # BLD AUTO: 0.21 THOUSAND/ΜL (ref 0–0.61)
EOSINOPHIL NFR BLD AUTO: 2 % (ref 0–6)
ERYTHROCYTE [DISTWIDTH] IN BLOOD BY AUTOMATED COUNT: 14.7 % (ref 11.6–15.1)
EXT PREG TEST URINE: POSITIVE
EXT. CONTROL ED NAV: NORMAL
GLUCOSE UR STRIP-MCNC: NEGATIVE MG/DL
HCT VFR BLD AUTO: 43.2 % (ref 34.8–46.1)
HGB BLD-MCNC: 12.9 G/DL (ref 11.5–15.4)
HGB UR QL STRIP.AUTO: ABNORMAL
IMM GRANULOCYTES # BLD AUTO: 0.05 THOUSAND/UL (ref 0–0.2)
IMM GRANULOCYTES NFR BLD AUTO: 1 % (ref 0–2)
KETONES UR STRIP-MCNC: NEGATIVE MG/DL
LEUKOCYTE ESTERASE UR QL STRIP: NEGATIVE
LYMPHOCYTES # BLD AUTO: 2.77 THOUSANDS/ΜL (ref 0.6–4.47)
LYMPHOCYTES NFR BLD AUTO: 27 % (ref 14–44)
MCH RBC QN AUTO: 26.4 PG (ref 26.8–34.3)
MCHC RBC AUTO-ENTMCNC: 29.9 G/DL (ref 31.4–37.4)
MCV RBC AUTO: 89 FL (ref 82–98)
MONOCYTES # BLD AUTO: 0.47 THOUSAND/ΜL (ref 0.17–1.22)
MONOCYTES NFR BLD AUTO: 5 % (ref 4–12)
NEUTROPHILS # BLD AUTO: 6.85 THOUSANDS/ΜL (ref 1.85–7.62)
NEUTS SEG NFR BLD AUTO: 64 % (ref 43–75)
NITRITE UR QL STRIP: NEGATIVE
NON-SQ EPI CELLS URNS QL MICRO: ABNORMAL /HPF
NRBC BLD AUTO-RTO: 0 /100 WBCS
PH UR STRIP.AUTO: 6 [PH] (ref 4.5–8)
PLATELET # BLD AUTO: 434 THOUSANDS/UL (ref 149–390)
PMV BLD AUTO: 9 FL (ref 8.9–12.7)
PROT UR STRIP-MCNC: NEGATIVE MG/DL
RBC # BLD AUTO: 4.88 MILLION/UL (ref 3.81–5.12)
RBC #/AREA URNS AUTO: ABNORMAL /HPF
RH BLD: POSITIVE
SP GR UR STRIP.AUTO: >=1.03 (ref 1–1.03)
SPECIMEN EXPIRATION DATE: NORMAL
UROBILINOGEN UR QL STRIP.AUTO: 0.2 E.U./DL
WBC # BLD AUTO: 10.4 THOUSAND/UL (ref 4.31–10.16)
WBC #/AREA URNS AUTO: ABNORMAL /HPF

## 2022-04-03 PROCEDURE — 36415 COLL VENOUS BLD VENIPUNCTURE: CPT | Performed by: EMERGENCY MEDICINE

## 2022-04-03 PROCEDURE — 87086 URINE CULTURE/COLONY COUNT: CPT

## 2022-04-03 PROCEDURE — 86900 BLOOD TYPING SEROLOGIC ABO: CPT | Performed by: EMERGENCY MEDICINE

## 2022-04-03 PROCEDURE — 99284 EMERGENCY DEPT VISIT MOD MDM: CPT

## 2022-04-03 PROCEDURE — 81025 URINE PREGNANCY TEST: CPT | Performed by: EMERGENCY MEDICINE

## 2022-04-03 PROCEDURE — 86850 RBC ANTIBODY SCREEN: CPT | Performed by: EMERGENCY MEDICINE

## 2022-04-03 PROCEDURE — 86901 BLOOD TYPING SEROLOGIC RH(D): CPT | Performed by: EMERGENCY MEDICINE

## 2022-04-03 PROCEDURE — 84702 CHORIONIC GONADOTROPIN TEST: CPT | Performed by: EMERGENCY MEDICINE

## 2022-04-03 PROCEDURE — 85025 COMPLETE CBC W/AUTO DIFF WBC: CPT | Performed by: EMERGENCY MEDICINE

## 2022-04-03 PROCEDURE — 81001 URINALYSIS AUTO W/SCOPE: CPT

## 2022-04-03 PROCEDURE — 76815 OB US LIMITED FETUS(S): CPT

## 2022-04-03 PROCEDURE — 99284 EMERGENCY DEPT VISIT MOD MDM: CPT | Performed by: EMERGENCY MEDICINE

## 2022-04-03 NOTE — TELEPHONE ENCOUNTER
Regarding: Cotting in pregnancy  ----- Message from Gamal Grier sent at 4/3/2022 12:11 PM EDT -----  "Im currently pregnant and I started cotting yesterday "

## 2022-04-03 NOTE — DISCHARGE INSTRUCTIONS
The pregnancy could not be seen on the ultrasound  This means it could be too early in the pregnancy, a miscarriage, or an ectopic pregnancy (this is a surgical emergency)  You must obtain a repeat hormone level in 48 hours to see which way the pregnancy is going  You must call your OBGYN tomorrow for an appointment and let them know you were seen in the emergency room and a pregnancy could not be seen on the ultrasound  El embarazo no se pudo miya en la ecografía  Merritt significa que podría ser demasiado temprano [facility], un aborto espontáneo o un embarazo ectópico (esto es yoon emergencia [place])  Debe obtener un nivel hormonal repetido en 48 horas para miya en qué dirección va el embarazo  Debe llamar a meza ginecólogo mañana para concertar yoon gertrude e informarles que se lo atendió en la violet de emergencias y que no se pudo miya un embarazo en la ecografía  The translation was done through Google translate

## 2022-04-03 NOTE — ED PROVIDER NOTES
History  Chief Complaint   Patient presents with    Vaginal Bleeding     pt took out IUD on 3/8, started birth control pill right away but had a positive pregnancy test which OBGYN confirmed with bloodwork  vaginal bleeding that started yesterday along with intermittent cramping       31 y o  F p/w vaginal bleeding x 1 day  Pt reports she is pregnant  Noticed vaginal bleeding while wiping yesterday along with "light" lower abd cramps  Today, noticed a string of blood and then a gush of blood, didn't reports it didn't fill the toilet, and that she only felt it, so she came  Unsure of LMP, because she had an IUD recently taken out  History provided by:  Patient   used: No    Pregnancy Problem  Quality:  Lighter than menses  Duration:  1 day  Chronicity:  New  Prior pregnancy: yes    Pregnancy confirmed by ultrasound: no    Relieved by:  None tried  Worsened by:  Nothing  Ineffective treatments:  None tried  Associated symptoms: abdominal pain and nausea    Associated symptoms: no dysuria    Risk factors: prior miscarriage    Risk factors: no hx of ectopic pregnancy        Prior to Admission Medications   Prescriptions Last Dose Informant Patient Reported? Taking?    Prenatal Vit w/Zt-Zixpvjnej-XV (PNV PO)  Self Yes No   Sig: Take 1 tablet by mouth daily   Patient not taking: Reported on 3/8/2022    albuterol (PROVENTIL HFA,VENTOLIN HFA) 90 mcg/act inhaler  Self Yes No   Sig: Inhale 2 puffs every 6 (six) hours as needed for wheezing   benzonatate (TESSALON PERLES) 100 mg capsule   No No   Sig: Take 1 capsule (100 mg total) by mouth 3 (three) times a day as needed for cough   Patient not taking: Reported on 4/14/2021   clindamycin (CLEOCIN) 300 MG capsule   No No   Sig: Take 1 capsule (300 mg total) by mouth 4 (four) times a day for 10 days   ferrous sulfate 324 (65 Fe) mg   No No   Sig: Take 1 tablet (324 mg total) by mouth daily before breakfast   norethindrone-ethinyl estradiol (Loestrin 1/20, 21,) 1-20 MG-MCG per tablet   No No   Sig: Take 1 tablet by mouth daily   ondansetron (ZOFRAN) 4 mg tablet   No No   Sig: Take 1 tablet (4 mg total) by mouth every 8 (eight) hours as needed for nausea or vomiting   ondansetron (ZOFRAN-ODT) 4 mg disintegrating tablet   No No   Sig: Take 1 tablet (4 mg total) by mouth every 6 (six) hours as needed for nausea or vomiting   Patient not taking: Reported on 4/14/2021      Facility-Administered Medications: None       Past Medical History:   Diagnosis Date    Abnormal Pap smear of cervix     Anemia     Asthma     Gonorrhea     Migraine     tylenol    Miscarriage     Nexplanon removal     last assessed 02/07/17    Urinary tract infection     Varicella     patient had chicken pox       Past Surgical History:   Procedure Laterality Date    TONSILLECTOMY         Family History   Problem Relation Age of Onset    Asthma Mother     Asthma Father     Cancer Family         mother and father side    No Known Problems Son     No Known Problems Half-Sister     No Known Problems Half-Sister     No Known Problems Half-Sister     Asthma Half-Brother     No Known Problems Half-Brother     No Known Problems Son     Breast cancer Family      I have reviewed and agree with the history as documented  E-Cigarette/Vaping    E-Cigarette Use Never User      E-Cigarette/Vaping Substances    Nicotine No     THC No     CBD No     Flavoring No     Other No     Unknown No      Social History     Tobacco Use    Smoking status: Never Smoker    Smokeless tobacco: Never Used   Vaping Use    Vaping Use: Never used   Substance Use Topics    Alcohol use: Yes     Comment: social    Drug use: No       Review of Systems   Gastrointestinal: Positive for abdominal pain and nausea  Negative for vomiting  Genitourinary: Negative for dysuria and frequency  All other systems reviewed and are negative        Physical Exam  Physical Exam  Vitals and nursing note reviewed  Constitutional:       General: She is not in acute distress  Appearance: Normal appearance  She is well-developed  She is not ill-appearing, toxic-appearing or diaphoretic  HENT:      Head: Normocephalic and atraumatic  Eyes:      General: No scleral icterus  Neck:      Vascular: No JVD  Trachea: Trachea normal    Cardiovascular:      Rate and Rhythm: Normal rate and regular rhythm  Heart sounds: Normal heart sounds  No murmur heard  No friction rub  Pulmonary:      Effort: Pulmonary effort is normal  No accessory muscle usage or respiratory distress  Breath sounds: Normal breath sounds  No stridor  No wheezing, rhonchi or rales  Abdominal:      General: There is no distension  Palpations: Abdomen is soft  Abdomen is not rigid  There is no mass  Tenderness: There is abdominal tenderness in the suprapubic area  There is no guarding or rebound  Negative signs include Wright's sign and McBurney's sign  Musculoskeletal:      Cervical back: Normal range of motion  Skin:     General: Skin is warm and dry  Coloration: Skin is not pale  Findings: No rash  Neurological:      Mental Status: She is alert  GCS: GCS eye subscore is 4  GCS verbal subscore is 5  GCS motor subscore is 6     Psychiatric:         Behavior: Behavior normal          Vital Signs  ED Triage Vitals   Temperature Pulse Respirations Blood Pressure SpO2   04/03/22 1324 04/03/22 1323 04/03/22 1323 04/03/22 1323 04/03/22 1323   99 °F (37 2 °C) 82 16 119/61 97 %      Temp Source Heart Rate Source Patient Position - Orthostatic VS BP Location FiO2 (%)   04/03/22 1324 04/03/22 1323 04/03/22 1323 04/03/22 1323 --   Oral Monitor Sitting Right arm       Pain Score       04/03/22 1520       No Pain           Vitals:    04/03/22 1323   BP: 119/61   Pulse: 82   Patient Position - Orthostatic VS: Sitting         Visual Acuity      ED Medications  Medications - No data to display    Diagnostic Studies  Results Reviewed     Procedure Component Value Units Date/Time    Quantitative hCG [[id number]]  (Abnormal) Collected: 04/03/22 1346    Lab Status: Final result Specimen: Blood from Arm, Left Updated: 04/03/22 1458     HCG, Quant 4,079 8 mIU/mL     Narrative:       Expected Ranges:     Approximate               Approximate HCG  Gestation age          Concentration ( mIU/mL)  _____________          ______________________   Rachell Labrum                      HCG values  0 2-1                       5-50  1-2                           2-3                         100-5000  3-4                         500-86702  4-5                         1000-73967  5-6                         54130-937897  6-8                         97063-545274  8-12                        94556-261909      Urine Microscopic [[id number]]  (Abnormal) Collected: 04/03/22 1337    Lab Status: Final result Specimen: Urine, Clean Catch Updated: 04/03/22 1427     RBC, UA 4-10 /hpf      WBC, UA 0-1 /hpf      Epithelial Cells Occasional /hpf      Bacteria, UA Occasional /hpf     CBC and differential [[id number]]  (Abnormal) Collected: 04/03/22 1346    Lab Status: Final result Specimen: Blood from Arm, Left Updated: 04/03/22 1402     WBC 10 40 Thousand/uL      RBC 4 88 Million/uL      Hemoglobin 12 9 g/dL      Hematocrit 43 2 %      MCV 89 fL      MCH 26 4 pg      MCHC 29 9 g/dL      RDW 14 7 %      MPV 9 0 fL      Platelets 858 Thousands/uL      nRBC 0 /100 WBCs      Neutrophils Relative 64 %      Immat GRANS % 1 %      Lymphocytes Relative 27 %      Monocytes Relative 5 %      Eosinophils Relative 2 %      Basophils Relative 1 %      Neutrophils Absolute 6 85 Thousands/µL      Immature Grans Absolute 0 05 Thousand/uL      Lymphocytes Absolute 2 77 Thousands/µL      Monocytes Absolute 0 47 Thousand/µL      Eosinophils Absolute 0 21 Thousand/µL      Basophils Absolute 0 05 Thousands/µL     Urine culture [[id number]] Collected: 04/03/22 1337    Lab Status: In process Specimen: Urine, Clean Catch Updated: 22 1353    POCT pregnancy, urine [[id number]]  (Normal) Resulted: 22 1346    Lab Status: Final result Updated: 22 1346     EXT PREG TEST UR (Ref: Negative) positive     Control valid    Urine Macroscopic, POC [[id number]]  (Abnormal) Collected: 22 1337    Lab Status: Final result Specimen: Urine Updated: 22 1339     Color, UA Yellow     Clarity, UA Clear     pH, UA 6 0     Leukocytes, UA Negative     Nitrite, UA Negative     Protein, UA Negative mg/dl      Glucose, UA Negative mg/dl      Ketones, UA Negative mg/dl      Urobilinogen, UA 0 2 E U /dl      Bilirubin, UA Negative     Blood, UA Moderate     Specific Gravity, UA >=1 030    Narrative:      CLINITEK RESULT                 US OB pregnancy limited with transvaginal   Final Result by Juan Miguel Cee MD ( [id number])      Endometrial 3 mm cystic structure could represent gestational sac within the gestational sac  Differential remains early IUP, spontaneous  and ectopic pregnancy  Short-term follow-up with serial beta-hCG and pelvic/OB ultrasound recommended in 2    weeks  Heterogeneous endometrial canal debris possibly representing blood products  Workstation performed: [id number]                    Procedures  Procedures         ED Course  ED Course as of 22 1521   Sun 2022   1404 Hemoglobin: 12 9   1503 HCG QUANTITATIVE(!): 4,079 8  318 on 3/30/22   1510 Updated pt on results  Instructed her to obtain B-quant in 48 hours  She states she has an appt with her OBGYN tomorrow  I told her to keep that and see them tomorrow to f/u on quant and will need f/u US  SBIRT 20yo+      Most Recent Value   SBIRT (22 yo +)    In order to provide better care to our patients, we are screening all of our patients for alcohol and drug use  Would it be okay to ask you these screening questions?  Yes Filed at: 2022 1351   Initial Alcohol Screen: US AUDIT-C     1  How often do you have a drink containing alcohol? 0 Filed at: 04/03/2022 1351   2  How many drinks containing alcohol do you have on a typical day you are drinking? 0 Filed at: 04/03/2022 1351   3a  Male UNDER 65: How often do you have five or more drinks on one occasion? 0 Filed at: 04/03/2022 1351   3b  FEMALE Any Age, or MALE 65+: How often do you have 4 or more drinks on one occassion? 0 Filed at: 04/03/2022 1351   Audit-C Score 0 Filed at: 04/03/2022 1351   LEONARD: How many times in the past year have you    Used an illegal drug or used a prescription medication for non-medical reasons? Never Filed at: 04/03/2022 1351                    MDM    Disposition  Final diagnoses:   Threatened miscarriage   Vaginal bleeding in pregnancy   Abdominal cramps     Time reflects when diagnosis was documented in both MDM as applicable and the Disposition within this note     Time User Action Codes Description Comment    4/3/2022  3:14 PM Dayday Peal [O20 0] Threatened miscarriage     4/3/2022  3:14 PM Dayday Peal [O46 90] Vaginal bleeding in pregnancy     4/3/2022  3:14 PM Mariann Mcginnis [R10 9] Abdominal cramps       ED Disposition     ED Disposition Condition Date/Time Comment    Discharge Stable Sun Apr 3, 2022  3:15 PM Madison Lynn discharge to home/self care              Follow-up Information     Follow up With Specialties Details Why Contact Info    Your OBGYN  Go in 1 day As scheduled to follow up on your B-quant level           Discharge Medication List as of 4/3/2022  3:15 PM      CONTINUE these medications which have NOT CHANGED    Details   albuterol (PROVENTIL HFA,VENTOLIN HFA) 90 mcg/act inhaler Inhale 2 puffs every 6 (six) hours as needed for wheezing, Historical Med      benzonatate (TESSALON PERLES) 100 mg capsule Take 1 capsule (100 mg total) by mouth 3 (three) times a day as needed for cough, Starting Mon 3/15/2021, Normal clindamycin (CLEOCIN) 300 MG capsule Take 1 capsule (300 mg total) by mouth 4 (four) times a day for 10 days, Starting Sun 3/27/2022, Until Wed 4/6/2022, Normal      ferrous sulfate 324 (65 Fe) mg Take 1 tablet (324 mg total) by mouth daily before breakfast, Starting Wed 7/15/2020, Until Tue 10/13/2020, Normal      norethindrone-ethinyl estradiol (Loestrin 1/20, 21,) 1-20 MG-MCG per tablet Take 1 tablet by mouth daily, Starting Tue 3/8/2022, Normal      ondansetron (ZOFRAN) 4 mg tablet Take 1 tablet (4 mg total) by mouth every 8 (eight) hours as needed for nausea or vomiting, Starting Sun 3/27/2022, Normal      ondansetron (ZOFRAN-ODT) 4 mg disintegrating tablet Take 1 tablet (4 mg total) by mouth every 6 (six) hours as needed for nausea or vomiting, Starting Mon 3/15/2021, Normal      Prenatal Vit w/Uc-Qrcjdbfok-ZN (PNV PO) Take 1 tablet by mouth daily, Historical Med             Outpatient Discharge Orders   hCG, quantitative   Standing Status: Future Standing Exp   Date: 04/03/23       PDMP Review     None          ED Provider  Electronically Signed by           Gianfranco Lamb, DO  04/03/22 9983

## 2022-04-03 NOTE — TELEPHONE ENCOUNTER
Answer Assessment - Initial Assessment Questions  1  ONSET: "When did this bleeding start?"        Yesterday in am    2  DESCRIPTION: "Describe the bleeding that you are having " "How much bleeding is there?"     - SPOTTING: spotting, or pinkish / brownish mucous discharge; does not fill panti-liner or pad     - MILD:  less than 1 pad / hour; less than patient's usual menstrual bleeding    - MODERATE: 1-2 pads / hour; 1 menstrual cup every 6 hours; small-medium blood clots (e g , pea, grape, small coin)    - SEVERE: soaking 2 or more pads/hour for 2 or more hours; 1 menstrual cup every 2 hours; bleeding not contained by pads or continuous red blood from vagina; large blood clots (e g , golf ball, large coin)       Mild with clots    3  ABDOMINAL PAIN SEVERITY: If present, ask: "How bad is it?"  (e g , Scale 1-10; mild, moderate, or severe)    - MILD (1-3): doesn't interfere with normal activities, abdomen soft and not tender to touch     - MODERATE (4-7): interferes with normal activities or awakens from sleep, tender to touch     - SEVERE (8-10): excruciating pain, doubled over, unable to do any normal activities      Denies     PT already en route to ED at time of triage call      Protocols used: PREGNANCY - VAGINAL BLEEDING LESS THAN 20 WEEKS [facility]

## 2022-04-04 ENCOUNTER — OFFICE VISIT (OUTPATIENT)
Dept: OBGYN CLINIC | Facility: CLINIC | Age: 27
End: 2022-04-04
Payer: MEDICARE

## 2022-04-04 VITALS — DIASTOLIC BLOOD PRESSURE: 80 MMHG | SYSTOLIC BLOOD PRESSURE: 112 MMHG | WEIGHT: 153.2 LBS | BODY MASS INDEX: 29.92 KG/M2

## 2022-04-04 DIAGNOSIS — N92.6 IRREGULAR BLEEDING: ICD-10-CM

## 2022-04-04 DIAGNOSIS — N94.6 DYSMENORRHEA: ICD-10-CM

## 2022-04-04 DIAGNOSIS — Z32.00 ENCOUNTER FOR PREGNANCY TEST, RESULT UNKNOWN: Primary | ICD-10-CM

## 2022-04-04 DIAGNOSIS — O20.0 THREATENED ABORTION: ICD-10-CM

## 2022-04-04 LAB — SL AMB POCT URINE HCG: POSITIVE

## 2022-04-04 PROCEDURE — 81025 URINE PREGNANCY TEST: CPT | Performed by: OBSTETRICS & GYNECOLOGY

## 2022-04-04 PROCEDURE — 99214 OFFICE O/P EST MOD 30 MIN: CPT | Performed by: OBSTETRICS & GYNECOLOGY

## 2022-04-04 NOTE — PROGRESS NOTES
Assessment/Plan   Diagnoses and all orders for this visit:    Encounter for pregnancy test, result unknown  -     POCT urine HCG    Threatened   -     hCG, quantitative; Future    Irregular bleeding    Dysmenorrhea    1  Threatened A/B-patient with unknown LMP  She had Mirena IUD removal done by myself on 3/8/22 as it was found in the lower uterine segment from ultrasound 2021  IUD was initially inserted by Dr Ranjit Frias on 3/10/21  She called on 3/30/22 with possible pregnancy  HCG on that day was 318  She was seen in the emergency room on 4/3/22 with HCG of 4080, hemoglobin 12 9 and urinalysis with 0-1 wbc's and 4-10 RBCs  She does have light bleeding noted on exam today  Ultrasound from 4/3/22 visit demonstrates possible 3 mm sac in the uterus  She was counseled in detail about the findings  This could represent threatened A/B verses early miscarriage versus possible early ectopic pregnancy  She denies any severe pain or abdominal distention or orthostatic symptoms  She was highly encouraged to call if any these symptoms occur  Other than mild cramping and bleeding she denies any symptoms  She will continue to follow this  Request repeat HCG to be done tomorrow, 22 was given  If continued rise in hCG is noted, to plan ultrasound in about 10 days time to reassess  2  Contraception-stated she started Lo Loestrin  within a couple days after removal of IUD  Would suggest she stop this at this time  3  History of anemia-hemoglobin 12 9 on recent lab work  4  Prior breast finding-no current concerns  5  History of hormone side effects  6  History of postcoital bleeding-no current concerns  To follow-up in 10 days time for ultrasound and office visit with me  Subjective   Patient ID: Kayla Cruz is a 31 y o  female      Vitals:    22 1049   BP: 112/80     HPI    The following portions of the patient's history were reviewed and updated as appropriate: allergies, current medications, past family history, past medical history, past social history, past surgical history and problem list   Past Medical History:   Diagnosis Date    Abnormal Pap smear of cervix     Anemia     Asthma     Gonorrhea     Migraine     tylenol    Miscarriage     Nexplanon removal     last assessed 17    Urinary tract infection     Varicella     patient had chicken pox     Past Surgical History:   Procedure Laterality Date    TONSILLECTOMY       OB History    Para Term  AB Living   5 3 3   1 3   SAB IAB Ectopic Multiple Live Births   1     0 3      # Outcome Date GA Lbr Ronald/2nd Weight Sex Delivery Anes PTL Lv   5 Current            4 Term 20 38w6d  2810 g (6 lb 3 1 oz) M Vag-Spont EPI N MARGO   3 Term 17 38w2d / 00:36 3260 g (7 lb 3 oz) M Vag-Spont EPI N MARGO   2 Term 13 40w0d   M    MARGO   1 SAB                Current Outpatient Medications:     albuterol (PROVENTIL HFA,VENTOLIN HFA) 90 mcg/act inhaler, Inhale 2 puffs every 6 (six) hours as needed for wheezing, Disp: , Rfl:     benzonatate (TESSALON PERLES) 100 mg capsule, Take 1 capsule (100 mg total) by mouth 3 (three) times a day as needed for cough (Patient not taking: Reported on 2021), Disp: 20 capsule, Rfl: 0    clindamycin (CLEOCIN) 300 MG capsule, Take 1 capsule (300 mg total) by mouth 4 (four) times a day for 10 days, Disp: 40 capsule, Rfl: 0    ferrous sulfate 324 (65 Fe) mg, Take 1 tablet (324 mg total) by mouth daily before breakfast, Disp: 90 tablet, Rfl: 1    norethindrone-ethinyl estradiol (Loestrin , ,) 1-20 MG-MCG per tablet, Take 1 tablet by mouth daily, Disp: 28 tablet, Rfl: 3    ondansetron (ZOFRAN) 4 mg tablet, Take 1 tablet (4 mg total) by mouth every 8 (eight) hours as needed for nausea or vomiting, Disp: 8 tablet, Rfl: 0    ondansetron (ZOFRAN-ODT) 4 mg disintegrating tablet, Take 1 tablet (4 mg total) by mouth every 6 (six) hours as needed for nausea or vomiting (Patient not taking: Reported on 4/14/2021), Disp: 20 tablet, Rfl: 0    Prenatal Vit w/Jr-Ctirklvfx-UY (PNV PO), Take 1 tablet by mouth daily (Patient not taking: Reported on 3/8/2022 ), Disp: , Rfl:   Allergies   Allergen Reactions    Fruit Extracts      Category: Allergy;  Annotation - [id number]: pomegranite lotion has skin reaction, pomegranite fruit has itching and swelling     Social History     Socioeconomic History    Marital status: Single     Spouse name: Not on file    Number of children: 1    Years of education: Not on file    Highest education level: Not on file   Occupational History    Not on file   Tobacco Use    Smoking status: Never Smoker    Smokeless tobacco: Never Used   Vaping Use    Vaping Use: Never used   Substance and Sexual Activity    Alcohol use: Yes     Comment: social    Drug use: No    Sexual activity: Yes     Partners: Male     Birth control/protection: Condom Male, Injection   Other Topics Concern    Not on file   Social History Narrative    Always uses seat belt    Caffeine use    No Advent beliefs     Social Determinants of Health     Financial Resource Strain: Not on file   Food Insecurity: Not on file   Transportation Needs: Not on file   Physical Activity: Not on file   Stress: Not on file   Social Connections: Not on file   Intimate Partner Violence: Not on file   Housing Stability: Not on file     Family History   Problem Relation Age of Onset    Asthma Mother     Asthma Father     Cancer Family         mother and father side    No Known Problems Son     No Known Problems Half-Sister     No Known Problems Half-Sister     No Known Problems Half-Sister     Asthma Half-Brother     No Known Problems Half-Brother     No Known Problems Son     Breast cancer Family        Review of Systems    Objective   Physical Exam  OBGyn Exam     Objective      /80 (BP Location: Left arm, Patient Position: Sitting, Cuff Size: Adult)   Wt 69 5 kg (153 lb 3 2 oz)   BMI 29 92 kg/m²     General:   alert and oriented, in no acute distress   Neck:    Breast:    Heart:    Lungs:    Abdomen: soft, non-tender, without masses or organomegaly   Vulva: normal   Vagina: Small amount of blood per vagina, without erythema or lesions or discharge noted  Cervix: Small amount of blood at os, without active bleeding  No Cervical motion tenderness nor cervicitis  Uterus: Midposition, top-normal size, mildly tender, without masses or lesions appreciated     Adnexa: no mass, fullness, tenderness   Rectum: negative    Psych:  Normal mood and affect   Skin:  Without obvious lesions   Eyes: symmetric, with normal movements and reactivity   Musculoskeletal:  Normal muscle tone and movements appreciated

## 2022-04-04 NOTE — PATIENT INSTRUCTIONS
Threatened Miscarriage   WHAT YOU NEED TO KNOW:   What is a threatened miscarriage? A threatened miscarriage occurs when you have vaginal bleeding within the first 20 weeks of pregnancy  It means that a miscarriage may happen  A threatened miscarriage may also be called a threatened   What causes bleeding or spotting during pregnancy? The cause of your bleeding or spotting may not be known  The following are possible causes of vaginal bleeding during pregnancy:  · Polyps, fibroids, or cysts in the uterus    · Sexual intercourse    · Infection    · Where or how the placenta is attached to your uterus (womb)    · A problem with your fetus (unborn baby)    · Drug or alcohol use    · Ectopic pregnancy (the fetus is growing outside of the uterus)    What are the signs and symptoms of a threatened miscarriage? · Vaginal spotting or bleeding    · Pain or cramping in your abdomen or lower back    How is a threatened miscarriage diagnosed? Tell your healthcare provider when your bleeding started  You may need any of the following:  · Blood tests  may show infection, check your level of pregnancy hormone, or give information about your overall health  · A pelvic exam  checks the size of your uterus  A pelvic exam also checks your cervix for dilation (opening)  · A pelvic ultrasound  shows pictures of the fetus and finds his or her heartbeat  A pelvic ultrasound also looks at your reproductive organs and monitors the amount of bleeding  How is a threatened miscarriage managed? The following may help you manage your symptoms and decrease your risk for a miscarriage:  · Do not put anything in your vagina  Do not have sex, douche, or use tampons  These actions may increase your risk for infection and miscarriage  · Rest as directed  Do not exercise or do strenuous activities  These activities may cause  labor or miscarriage  Ask your healthcare provider what activities are okay to do      When should I seek immediate care? · You feel weak or faint  · Your pain or cramping in your abdomen or back gets worse  · You have vaginal bleeding that soaks 1 or more pads in an hour  · You pass material that looks like tissue or large clots  When should I call my doctor? · You have a fever  · You have trouble urinating, burning when you urinate, or feel a need to urinate often  · You have new or worsening vaginal bleeding  · You have vaginal pain or itching, or vaginal discharge that is yellow, green, or foul-smelling  · You have questions or concerns about your condition or care  CARE AGREEMENT:   You have the right to help plan your care  Learn about your health condition and how it may be treated  Discuss treatment options with your healthcare providers to decide what care you want to receive  You always have the right to refuse treatment  The above information is an  only  It is not intended as medical advice for individual conditions or treatments  Talk to your doctor, nurse or pharmacist before following any medical regimen to see if it is safe and effective for you  © Copyright [employer] 2022 Information is for End User's use only and may not be sold, redistributed or otherwise used for commercial purposes   All illustrations and images included in CareNotes® are the copyrighted property of [employer]  or [address] [place] [address] [employer]

## 2022-04-05 ENCOUNTER — APPOINTMENT (OUTPATIENT)
Dept: LAB | Facility: HOSPITAL | Age: 27
End: 2022-04-05
Attending: OBSTETRICS & GYNECOLOGY
Payer: MEDICARE

## 2022-04-05 DIAGNOSIS — O20.0 THREATENED ABORTION: ICD-10-CM

## 2022-04-05 LAB
B-HCG SERPL-ACNC: 6417.3 MIU/ML
BACTERIA UR CULT: NORMAL

## 2022-04-05 PROCEDURE — 36415 COLL VENOUS BLD VENIPUNCTURE: CPT

## 2022-04-05 PROCEDURE — 84702 CHORIONIC GONADOTROPIN TEST: CPT

## 2022-04-06 DIAGNOSIS — O20.0 THREATENED ABORTION: Primary | ICD-10-CM

## 2022-04-07 ENCOUNTER — APPOINTMENT (OUTPATIENT)
Dept: LAB | Facility: HOSPITAL | Age: 27
End: 2022-04-07
Attending: OBSTETRICS & GYNECOLOGY
Payer: MEDICARE

## 2022-04-07 ENCOUNTER — TELEPHONE (OUTPATIENT)
Dept: OBGYN CLINIC | Facility: CLINIC | Age: 27
End: 2022-04-07

## 2022-04-07 DIAGNOSIS — O20.0 THREATENED ABORTION: ICD-10-CM

## 2022-04-07 LAB
B-HCG SERPL-ACNC: 7919 MIU/ML
ERYTHROCYTE [DISTWIDTH] IN BLOOD BY AUTOMATED COUNT: 14.6 % (ref 11.6–15.1)
HCT VFR BLD AUTO: 38.3 % (ref 34.8–46.1)
HGB BLD-MCNC: 12.5 G/DL (ref 11.5–15.4)
MCH RBC QN AUTO: 27.1 PG (ref 26.8–34.3)
MCHC RBC AUTO-ENTMCNC: 32.6 G/DL (ref 31.4–37.4)
MCV RBC AUTO: 83 FL (ref 82–98)
PLATELET # BLD AUTO: 420 THOUSANDS/UL (ref 149–390)
PMV BLD AUTO: 9.4 FL (ref 8.9–12.7)
RBC # BLD AUTO: 4.61 MILLION/UL (ref 3.81–5.12)
WBC # BLD AUTO: 7.63 THOUSAND/UL (ref 4.31–10.16)

## 2022-04-07 PROCEDURE — 36415 COLL VENOUS BLD VENIPUNCTURE: CPT

## 2022-04-07 PROCEDURE — 85027 COMPLETE CBC AUTOMATED: CPT

## 2022-04-07 PROCEDURE — 84702 CHORIONIC GONADOTROPIN TEST: CPT

## 2022-04-08 ENCOUNTER — TELEPHONE (OUTPATIENT)
Dept: OBGYN CLINIC | Facility: CLINIC | Age: 27
End: 2022-04-08

## 2022-04-08 NOTE — TELEPHONE ENCOUNTER
Spoke with patient who reports bleeding and pain has subsided  Pt has many questions regarding pregnancy and reasons for insufficient increases in HCG  Discussed with patient the inability to know at this time  We will continue to plan for f/u and an US next week  Pt educated on when to call the office with symptoms  Discussed with Dr Gabriel Garcia

## 2022-04-12 ENCOUNTER — NURSE TRIAGE (OUTPATIENT)
Dept: OTHER | Facility: OTHER | Age: 27
End: 2022-04-12

## 2022-04-12 NOTE — TELEPHONE ENCOUNTER
Regarding: Just had miscarriage a blood clot the size of a Oero came out  ----- Message from Ailin Tang sent at 4/12/2022  7:25 PM EDT -----  '' I think I just had a miscarriage a big blood clots just out the size of a Oreo cookie  ''

## 2022-04-12 NOTE — TELEPHONE ENCOUNTER
Reason for Disposition   MODERATE vaginal bleeding (e g , soaking 1 pad per hour; clots)    Answer Assessment - Initial Assessment Questions  1  ONSET: "When did this bleeding start?"        Has been having a lot of complications from bleeding from the last 2 weeks     2  DESCRIPTION: "Describe the bleeding that you are having " "How much bleeding is there?"     - SPOTTING: spotting, or pinkish / brownish mucous discharge; does not fill panti-liner or pad     - MILD:  less than 1 pad / hour; less than patient's usual menstrual bleeding    - MODERATE: 1-2 pads / hour; 1 menstrual cup every 6 hours; small-medium blood clots (e g , pea, grape, small coin)    - SEVERE: soaking 2 or more pads/hour for 2 or more hours; 1 menstrual cup every 2 hours; bleeding not contained by pads or continuous red blood from vagina; large blood clots (e g , golf ball, large coin)       Passed a large clot size of an oreo cookie, heavy bleeding 1 pad an hour for the 5 hours     3  ABDOMINAL PAIN SEVERITY: If present, ask: "How bad is it?"  (e g , Scale 1-10; mild, moderate, or severe)    - MILD (1-3): doesn't interfere with normal activities, abdomen soft and not tender to touch     - MODERATE (4-7): interferes with normal activities or awakens from sleep, tender to touch     - SEVERE (8-10): excruciating pain, doubled over, unable to do any normal activities      Having cramping pain in lower abdomen and back moderate to severe     4  PREGNANCY: "Do you know how many weeks or months pregnant you are?" "When was the first day of your last normal menstrual period?"      Unsure, has not had US yet     5  HEMODYNAMIC STATUS: "Are you weak or feeling lightheaded?" If Yes, ask: "Can you stand and walk normally?"       Denies     6   OTHER SYMPTOMS: "What other symptoms are you having with the bleeding?" (e g , passed tissue, vaginal discharge, fever, menstrual-type cramps)      Denies    Protocols used: PREGNANCY - VAGINAL BLEEDING LESS THAN 21 WEEKS EGA-ADULT-    Advised patient to take motrin to try to control the cramping and pain  Let her know that if the pain is not improved with motrin and if she is bleeding through 2 pads an hour she should go to ER  Let patient know she should follow up with office in the morning

## 2022-04-13 ENCOUNTER — APPOINTMENT (OUTPATIENT)
Dept: LAB | Facility: HOSPITAL | Age: 27
End: 2022-04-13
Attending: OBSTETRICS & GYNECOLOGY
Payer: MEDICARE

## 2022-04-13 DIAGNOSIS — N93.9 ABNORMAL UTERINE BLEEDING (AUB): ICD-10-CM

## 2022-04-13 DIAGNOSIS — O20.9 VAGINAL BLEEDING IN PREGNANCY, FIRST TRIMESTER: ICD-10-CM

## 2022-04-13 DIAGNOSIS — O20.0 THREATENED ABORTION: ICD-10-CM

## 2022-04-13 LAB
B-HCG SERPL-ACNC: 704.4 MIU/ML
ERYTHROCYTE [DISTWIDTH] IN BLOOD BY AUTOMATED COUNT: 14.6 % (ref 11.6–15.1)
HCT VFR BLD AUTO: 38 % (ref 34.8–46.1)
HGB BLD-MCNC: 12.5 G/DL (ref 11.5–15.4)
MCH RBC QN AUTO: 27 PG (ref 26.8–34.3)
MCHC RBC AUTO-ENTMCNC: 32.9 G/DL (ref 31.4–37.4)
MCV RBC AUTO: 82 FL (ref 82–98)
PLATELET # BLD AUTO: 385 THOUSANDS/UL (ref 149–390)
PMV BLD AUTO: 9.4 FL (ref 8.9–12.7)
PROGEST SERPL-MCNC: 1 NG/ML
RBC # BLD AUTO: 4.63 MILLION/UL (ref 3.81–5.12)
WBC # BLD AUTO: 8.35 THOUSAND/UL (ref 4.31–10.16)

## 2022-04-13 PROCEDURE — 36415 COLL VENOUS BLD VENIPUNCTURE: CPT

## 2022-04-13 PROCEDURE — 85027 COMPLETE CBC AUTOMATED: CPT

## 2022-04-13 PROCEDURE — 84144 ASSAY OF PROGESTERONE: CPT

## 2022-04-13 PROCEDURE — 84702 CHORIONIC GONADOTROPIN TEST: CPT

## 2022-04-13 NOTE — TELEPHONE ENCOUNTER
Repeat CBC, beta HCG, progesterone ordered recommend try to get that done today if possible  Keep appointment tomorrow, call if pain or bleeding get suddenly worse      O-positive blood type

## 2022-04-13 NOTE — TELEPHONE ENCOUNTER
Agree, has appoint with me for ultrasound and office visit/exam   Laboratory testing would be great today so we can compare previous results and assess entirely    Thanks, Cleveland Live MD

## 2022-04-14 NOTE — TELEPHONE ENCOUNTER
Patient had HCG which fell significantly, consistent with patient's clinical observation of probable miscarriage  She did not follow-up with office visit nor ultrasound today  Would check in with her to see how she is doing  If okay, would repeat HCG in a week, we will follow down to 0  Would suggest follow-up with me in a few weeks time to discuss everything and plan contraception    Thanks, Ashish Reyes MD

## 2022-04-20 ENCOUNTER — APPOINTMENT (OUTPATIENT)
Dept: LAB | Facility: HOSPITAL | Age: 27
End: 2022-04-20
Attending: OBSTETRICS & GYNECOLOGY
Payer: MEDICARE

## 2022-04-20 DIAGNOSIS — O03.4 INCOMPLETE SPONTANEOUS ABORTION: ICD-10-CM

## 2022-04-20 LAB — B-HCG SERPL-ACNC: 43.3 MIU/ML

## 2022-04-20 PROCEDURE — 84702 CHORIONIC GONADOTROPIN TEST: CPT

## 2022-04-20 PROCEDURE — 36415 COLL VENOUS BLD VENIPUNCTURE: CPT

## 2022-05-15 ENCOUNTER — TELEPHONE (OUTPATIENT)
Dept: OBGYN CLINIC | Facility: CLINIC | Age: 27
End: 2022-05-15

## 2022-05-15 NOTE — TELEPHONE ENCOUNTER
Received message about pelvic ultrasound which was not done  Additionally, patient has not had any follow-up HCGs  Will have the nurses/mas contact her in follow-up to see how she is doing     Thanks, Munir Louis MD    ----- Message from [address] sent at 5/14/2022  7:17 PM EDT -----  Regarding: Cancellation of Order # [id number]  Order number [id number] for the procedure AMB [address] [[id number]] has been canceled by System Default User   [SYSUSER]  This procedure was ordered by you on Apr 14, 2022 for   the patient Orly Martinez [[id number]]  The reason for  cancellation was "None"

## 2022-06-01 ENCOUNTER — TELEPHONE (OUTPATIENT)
Dept: OBGYN CLINIC | Facility: CLINIC | Age: 27
End: 2022-06-01

## 2022-06-01 NOTE — TELEPHONE ENCOUNTER
Patient c/o vaginal redness with tenderness, and burning after using Monistat.   Appointment scheduled

## 2022-06-02 ENCOUNTER — OFFICE VISIT (OUTPATIENT)
Dept: OBGYN CLINIC | Facility: CLINIC | Age: 27
End: 2022-06-02
Payer: MEDICARE

## 2022-06-02 VITALS — SYSTOLIC BLOOD PRESSURE: 122 MMHG | WEIGHT: 150 LBS | BODY MASS INDEX: 29.29 KG/M2 | DIASTOLIC BLOOD PRESSURE: 70 MMHG

## 2022-06-02 DIAGNOSIS — B37.3 VAGINAL CANDIDIASIS: ICD-10-CM

## 2022-06-02 DIAGNOSIS — Z20.2 POSSIBLE EXPOSURE TO STD: Primary | ICD-10-CM

## 2022-06-02 LAB
BV WHIFF TEST VAG QL: NEGATIVE
CLUE CELLS SPEC QL WET PREP: NEGATIVE
PH SMN: 4.5 [PH]
SL AMB POCT WET MOUNT: YES
T VAGINALIS VAG QL WET PREP: NEGATIVE
YEAST VAG QL WET PREP: ABNORMAL

## 2022-06-02 PROCEDURE — 87491 CHLMYD TRACH DNA AMP PROBE: CPT | Performed by: OBSTETRICS & GYNECOLOGY

## 2022-06-02 PROCEDURE — 87210 SMEAR WET MOUNT SALINE/INK: CPT | Performed by: OBSTETRICS & GYNECOLOGY

## 2022-06-02 PROCEDURE — 87591 N.GONORRHOEAE DNA AMP PROB: CPT | Performed by: OBSTETRICS & GYNECOLOGY

## 2022-06-02 PROCEDURE — 87086 URINE CULTURE/COLONY COUNT: CPT | Performed by: OBSTETRICS & GYNECOLOGY

## 2022-06-02 PROCEDURE — 99214 OFFICE O/P EST MOD 30 MIN: CPT | Performed by: OBSTETRICS & GYNECOLOGY

## 2022-06-02 RX ORDER — FLUCONAZOLE 150 MG/1
150 TABLET ORAL ONCE
Qty: 2 TABLET | Refills: 0 | Status: SHIPPED | OUTPATIENT
Start: 2022-06-02 | End: 2022-06-02

## 2022-06-02 NOTE — PROGRESS NOTES
Assessment/Plan   Diagnoses and all orders for this visit:    Possible exposure to STD  -     HIV 1/2 Antigen/Antibody (4th Generation) w Reflex [facility]; Future  -     RPR; Future  -     Hepatitis panel, acute; Future    Vaginal candidiasis  -     fluconazole (DIFLUCAN) 150 mg tablet; Take 1 tablet (150 mg total) by mouth once for 1 dose Repeat in 1 week  -     POCT wet mount  -     nystatin-triamcinolone (MYCOLOG-II) cream; Apply topically 2 (two) times a day as needed (Itching/irritation)    1  Yeast vulvovaginitis- patient noted this 5 days ago  Took miconazole 3 ending on 5/30/22  She noted some irritation after this  Exam today with some small amount of discharge and erythema noted at the vagina and vestibule/labia minora  Wet prep with small amount of yeast still noted  Treatment options reviewed  Electronic prescription for fluconazole 150 mg p o  X1 with repeat in 1 week time sent along with Mycolog cream to use b i d  P r n  Denise Serum She will call or return with any issues  2  Vaginitis/vulvitis-did use different condom with last intercourse which could be contributing to her symptoms  She will go back to the old condoms  3  Contraception-using condoms  Counseled about other options previously and reviewed again today  She will consider going forward  Did have IUD previously along with Lo Loestrin  4  Spontaneous miscarriage-never did follow HCG level down to 0  HCG was 704 on 4/13/22, down to HCG of 43 on 4/20/22  Urine pregnancy test today was negative  5  STD testing-with partner for 1 year's time  GC/chlamydia done today with urine testing  Laboratory sheet given for HIV, RPR, and hepatitis panel  6  History of anemia-normal hemoglobin noted most recently  7  History of postcoital bleeding-no current concerns  8  History of hormonal side effects/breast finding-no current concerns  9  Dysuria-likely related to urine causing irritation externally due to the vulvitis    Urine culture sent to rule out UTI  Follow-up near future for yearly exam as scheduled or as needed  6  Subjective   Patient ID: Matias Mac is a 31 y o  female  Vitals:    22 1319   BP: 122/70     Patient was seen today for follow-up visit  Please see assessment plan for details        The following portions of the patient's history were reviewed and updated as appropriate: allergies, current medications, past family history, past medical history, past social history, past surgical history and problem list   Past Medical History:   Diagnosis Date    Abnormal Pap smear of cervix     Anemia     Asthma     Gonorrhea     Migraine     tylenol    Miscarriage     Nexplanon removal     last assessed 17    Urinary tract infection     Varicella     patient had chicken pox     Past Surgical History:   Procedure Laterality Date    TONSILLECTOMY       OB History    Para Term  AB Living   5 3 3   1 3   SAB IAB Ectopic Multiple Live Births   1     0 3      # Outcome Date GA Lbr Ronald/2nd Weight Sex Delivery Anes PTL Lv   5 Current            4 Term 20 38w6d  2810 g (6 lb 3 1 oz) M Vag-Spont EPI N MARGO   3 Term 17 38w2d / 00:36 3260 g (7 lb 3 oz) M Vag-Spont EPI N MARGO   2 Term 13 40w0d   M    MARGO   1 SAB                Current Outpatient Medications:     albuterol (PROVENTIL HFA,VENTOLIN HFA) 90 mcg/act inhaler, Inhale 2 puffs every 6 (six) hours as needed for wheezing, Disp: , Rfl:     benzonatate (TESSALON PERLES) 100 mg capsule, Take 1 capsule (100 mg total) by mouth 3 (three) times a day as needed for cough (Patient not taking: Reported on 2021), Disp: 20 capsule, Rfl: 0    ferrous sulfate 324 (65 Fe) mg, Take 1 tablet (324 mg total) by mouth daily before breakfast, Disp: 90 tablet, Rfl: 1    norethindrone-ethinyl estradiol (Loestrin , ,) 1-20 MG-MCG per tablet, Take 1 tablet by mouth daily, Disp: 28 tablet, Rfl: 3    ondansetron (ZOFRAN) 4 mg tablet, Take 1 tablet (4 mg total) by mouth every 8 (eight) hours as needed for nausea or vomiting, Disp: 8 tablet, Rfl: 0    ondansetron (ZOFRAN-ODT) 4 mg disintegrating tablet, Take 1 tablet (4 mg total) by mouth every 6 (six) hours as needed for nausea or vomiting (Patient not taking: Reported on 4/14/2021), Disp: 20 tablet, Rfl: 0    Prenatal Vit w/Wy-Tjzcjnwop-ZL (PNV PO), Take 1 tablet by mouth daily (Patient not taking: Reported on 3/8/2022 ), Disp: , Rfl:   Allergies   Allergen Reactions    Fruit Extracts      Category: Allergy;  Annotation - [id number]: pomegranite lotion has skin reaction, pomegranite fruit has itching and swelling     Social History     Socioeconomic History    Marital status: Single     Spouse name: Not on file    Number of children: 1    Years of education: Not on file    Highest education level: Not on file   Occupational History    Not on file   Tobacco Use    Smoking status: Never Smoker    Smokeless tobacco: Never Used   Vaping Use    Vaping Use: Never used   Substance and Sexual Activity    Alcohol use: Yes     Comment: social    Drug use: No    Sexual activity: Yes     Partners: Male     Birth control/protection: Condom Male, Injection   Other Topics Concern    Not on file   Social History Narrative    Always uses seat belt    Caffeine use    No Druze beliefs     Social Determinants of Health     Financial Resource Strain: Not on file   Food Insecurity: Not on file   Transportation Needs: Not on file   Physical Activity: Not on file   Stress: Not on file   Social Connections: Not on file   Intimate Partner Violence: Not on file   Housing Stability: Not on file     Family History   Problem Relation Age of Onset    Asthma Mother     Asthma Father     Cancer Family         mother and father side    No Known Problems Son     No Known Problems Half-Sister     No Known Problems Half-Sister     No Known Problems Half-Sister     Asthma Half-Brother     No Known Problems Half-Brother  No Known Problems Son     Breast cancer Family        Review of Systems   Constitutional: Negative for chills, diaphoresis, fatigue and fever  Respiratory: Negative for apnea, cough, chest tightness, shortness of breath and wheezing  Cardiovascular: Negative for chest pain, palpitations and leg swelling  Gastrointestinal: Negative for abdominal distention, abdominal pain, anal bleeding, constipation, diarrhea, nausea, rectal pain and vomiting  Genitourinary: Negative for difficulty urinating, dyspareunia, dysuria, frequency, hematuria, menstrual problem, pelvic pain, urgency, vaginal bleeding, vaginal discharge and vaginal pain  Musculoskeletal: Negative for arthralgias, back pain and myalgias  Skin: Negative for color change and rash  Neurological: Negative for dizziness, syncope, light-headedness, numbness and headaches  Hematological: Negative for adenopathy  Does not bruise/bleed easily  Psychiatric/Behavioral: Negative for dysphoric mood and sleep disturbance  The patient is not nervous/anxious  Objective   Physical Exam  OBGyn Exam     Objective      /70 (BP Location: Left arm, Patient Position: Sitting, Cuff Size: Adult)   Wt 68 kg (150 lb)   BMI 29 29 kg/m²     General:   alert and oriented, in no acute distress   Neck:    Breast:    Heart:    Lungs:    Abdomen: soft, non-tender, without masses or organomegaly   Vulva: Mild erythema noted at the bilateral labia minora/vestibule, no focal lesions appreciated  Vagina: Mild erythema noted  Without lesions  Small amount of white discharge   Cervix: Without lesions or discharge or cervicitis    No Cervical motion tenderness   Uterus: top normal size, anteverted, non-tender   Adnexa: no mass, fullness, tenderness   Rectum: deferred    Psych:  Normal mood and affect   Skin:  Without obvious lesions   Eyes: symmetric, with normal movements and reactivity   Musculoskeletal:  Normal muscle tone and movements appreciated

## 2022-06-03 LAB
C TRACH DNA SPEC QL NAA+PROBE: NEGATIVE
N GONORRHOEA DNA SPEC QL NAA+PROBE: NEGATIVE

## 2022-06-04 LAB — BACTERIA UR CULT: NORMAL

## 2022-08-04 ENCOUNTER — OFFICE VISIT (OUTPATIENT)
Dept: OBGYN CLINIC | Facility: CLINIC | Age: 27
End: 2022-08-04
Payer: MEDICARE

## 2022-08-04 VITALS
BODY MASS INDEX: 28.9 KG/M2 | SYSTOLIC BLOOD PRESSURE: 118 MMHG | HEIGHT: 60 IN | DIASTOLIC BLOOD PRESSURE: 70 MMHG | WEIGHT: 147.2 LBS

## 2022-08-04 DIAGNOSIS — Z34.90 PREGNANCY AT EARLY STAGE: Primary | ICD-10-CM

## 2022-08-04 DIAGNOSIS — N91.2 AMENORRHEA: ICD-10-CM

## 2022-08-04 DIAGNOSIS — Z11.3 SCREENING EXAMINATION FOR STD (SEXUALLY TRANSMITTED DISEASE): ICD-10-CM

## 2022-08-04 LAB
EXTERNAL CHLAMYDIA SCREEN: NEGATIVE
SL AMB POCT URINE HCG: POSITIVE

## 2022-08-04 PROCEDURE — 99213 OFFICE O/P EST LOW 20 MIN: CPT | Performed by: NURSE PRACTITIONER

## 2022-08-04 PROCEDURE — 87491 CHLMYD TRACH DNA AMP PROBE: CPT | Performed by: NURSE PRACTITIONER

## 2022-08-04 PROCEDURE — 81025 URINE PREGNANCY TEST: CPT | Performed by: NURSE PRACTITIONER

## 2022-08-04 PROCEDURE — 87591 N.GONORRHOEAE DNA AMP PROB: CPT | Performed by: NURSE PRACTITIONER

## 2022-08-04 NOTE — PROGRESS NOTES
Assessment/Plan:    1  Pregnancy at early stage  In 30 yo  at 4w 4d gestation based upon LMP  She has dating US booked  Detailed histories obtained  G/C collected  2  Screening examination for STD (sexually transmitted disease)    - Chlamydia/GC amplified DNA by PCR    3  Amenorrhea  Positive    - POCT urine HCG      Subjective:      Patient ID: Elizabeth Cavanaugh is a 31 y o  female  HPI   PROBLEM VISIT  CC: amenorrhea/confirm pregnancy    30 yo  presents to confirm pregnancy  She is new to our practice  She used to see Dr Manuela Begum  Pregnancy unplanned but desired  FOB Kitty Shah is involved  4w 4d gestation based upon LMP of 7/3/22, patients cycles were regular, not using BC  Has dating US booked 22 G/C negative  2020 pap negative; hx of abnormal   STD hx: gonorrhea, treated    Pertinent Hx:  Asthma- currently under control, has not needed inhaler; uses her father's inhaler as needed  Does not have a PCP  Advised her to establish care next door for potential asthma management  Accepts blood transfusion      The following portions of the patient's history were reviewed and updated as appropriate: allergies, current medications, past family history, past medical history, past social history, past surgical history and problem list     Review of Systems   Constitutional: Negative for chills and fever  Respiratory: Negative for cough and shortness of breath  Gastrointestinal: Positive for nausea  Genitourinary: Negative  Negative for pelvic pain and vaginal bleeding  Musculoskeletal: Negative for arthralgias and myalgias  Objective:    /70 (BP Location: Left arm, Patient Position: Sitting, Cuff Size: Standard)   Ht 5' (1 524 m)   Wt 66 8 kg (147 lb 3 2 oz)   LMP 2022   BMI 28 75 kg/m²     Urine HCG positive     Physical Exam  Constitutional:       General: She is not in acute distress  Appearance: Normal appearance   She is well-developed  She is not ill-appearing or diaphoretic  Comments: bmi 28 8   HENT:      Head: Normocephalic and atraumatic  Eyes:      Pupils: Pupils are equal, round, and reactive to light  Pulmonary:      Effort: Pulmonary effort is normal    Abdominal:      General: Abdomen is flat  Palpations: Abdomen is soft  Genitourinary:     General: Normal vulva  Exam position: Lithotomy position  Labia:         Right: No rash, tenderness, lesion or injury  Left: No rash, tenderness, lesion or injury  Vagina: No signs of injury and foreign body  No vaginal discharge, erythema, tenderness or bleeding  Cervix: No cervical motion tenderness, discharge or friability  Uterus: Not enlarged and not tender  Adnexa:         Right: No mass or tenderness  Left: No mass or tenderness  Skin:     General: Skin is warm and dry  Neurological:      General: No focal deficit present  Mental Status: She is alert and oriented to person, place, and time  Psychiatric:         Mood and Affect: Mood normal          Behavior: Behavior normal          Thought Content:  Thought content normal          Judgment: Judgment normal

## 2022-08-05 LAB
C TRACH DNA SPEC QL NAA+PROBE: NEGATIVE
N GONORRHOEA DNA SPEC QL NAA+PROBE: NEGATIVE

## 2022-08-23 ENCOUNTER — ULTRASOUND (OUTPATIENT)
Dept: OBGYN CLINIC | Facility: CLINIC | Age: 27
End: 2022-08-23
Payer: MEDICARE

## 2022-08-23 DIAGNOSIS — O36.80X1 ENCOUNTER TO DETERMINE FETAL VIABILITY OF PREGNANCY, FETUS 1: Primary | ICD-10-CM

## 2022-08-23 PROCEDURE — 76801 OB US < 14 WKS SINGLE FETUS: CPT | Performed by: OBSTETRICS & GYNECOLOGY

## 2022-08-23 NOTE — PROGRESS NOTES
SV TCY=205 BPM  CRL=17 0mm=8w1d  EDC=4/4/2023    UT=122 x 73 x 76 mm  RT OV=33 x 28 x 30 mm  LT OV=29 x 25 x 21 mm

## 2022-08-31 ENCOUNTER — INITIAL PRENATAL (OUTPATIENT)
Dept: OBGYN CLINIC | Facility: CLINIC | Age: 27
End: 2022-08-31

## 2022-08-31 VITALS
HEIGHT: 60 IN | DIASTOLIC BLOOD PRESSURE: 66 MMHG | SYSTOLIC BLOOD PRESSURE: 118 MMHG | BODY MASS INDEX: 28.47 KG/M2 | WEIGHT: 145 LBS

## 2022-08-31 DIAGNOSIS — Z34.81 SUPERVISION OF NORMAL INTRAUTERINE PREGNANCY IN MULTIGRAVIDA, FIRST TRIMESTER: Primary | ICD-10-CM

## 2022-08-31 PROCEDURE — OBC

## 2022-08-31 NOTE — PROGRESS NOTES
OB INTAKE INTERVIEW    OB History        7    Para   3    Term   3            AB   3    Living   3       SAB   1    IAB    2    Ectopic        Multiple   0    Live Births   3           Obstetric Comments   Menarche 11  Cycles Q 28d             Last Menstrual Period: Patient's last menstrual period was 2022  Estimated Date of Delivery: 2023     Jarret Talley is a 28 y o  female  9 para 3 pregnant female who presents for her obstetrical intake  This pregnancy was not planned but "not prevented"  Father of the baby is involved  Pregnancy symptoms: breast tenderness, fatigue, nausea, vomiting and frequent urination            BMI: Body mass index is There is no height or weight on file to calculate BMI  Discussed appropriate weight gain in pregnancy based on pre-gravid BMI  Diabetes              Pregestational DM:  no              hx of GDM: no              first degree relative with type 2 diabetes:no              hx of PCOS: no              prior hx of LGA/macrosomia:no           Hypertension   Age 28 or older:no   Obesity (BMI over 30):no              Hx of chronic HTN: no              hx of gestational HTN:no              hx of preeclampsia, eclampsia, or HELLP syndrome: no              Family h/o preeclampsia:no  Renal Disease:no  Autoimmune disease (systemic lupus erythematosus, antiphospholipid antibody syndrome):no   Nulliparity:no              Multifetal gestation: no  More than 10 year pregnancy interval:no  Previous IUGR, low birthweight or small for gestational age:no     Infection Screening              Does the pt have a hx of MRSA? no              Recent travel outside of [place]? no     Thyroid- if yes order TSH with reflex T4  History of thyroid disease no    Bleeding Disorder or Hx of DVT-patient or first degree relative with history of  Order the following if not done previously     (Factor V, antithrombin III, prothrombin gene mutation, protein C and S Ag, lupus anticoagulant, anticardiolipin, beta-2 glycoprotein)   no    OB/GYN-  History of abnormal pap smear YES-LGSIL in 2016 (was pregnant and biopsies were not done)2020 pap negative   History of HPV YES  History of Herpes/HSV no  History of other STI (gonorrhea, chlamydia, trich) YES-GC    History of prior  YES  History of prior  no  History of  delivery prior to 36 weeks 6 days YES    History of blood transfusion no  Ok for blood transfusion Yes     Substance screening- if yes outside of tobacco for her or anyone in her home-order urine drug screen  History of tobacco use no  Currently using tobacco no  Currently using alcohol no  Presently using drugs no  Past drug use  no  IV drug use-If yes add Hep C antibody to labs no  Partner drug use no    Immunizations:  Influenza vaccine given this season No  Discussed Tdap vaccine Yes   Discussed COVID Vaccine Yes     Genetic/MFM-  Do you or your partner have a history of any of the following in yourselves or first degree relatives? Cystic fibrosis no  Spinal muscular atrophy no  Hemoglobinopathy/Sickle Cell/Thalassemia no  Fragile X Intellectual Disability no    If yes, discuss carrier screening and recommend consultation with MFM/genetic counseling  If no, discuss option for carrier screening and/or genetic testing with Nuchal Ultrasound  Patient interested Yes   Cystic Fibrosis Screening offered  Patient was advised to check insurance coverage prior to testing  Patient is not interested  Referral to MFM given Yes   Prenatal lab work scripts given Yes   Additional labs ordered: Hemoglobin Electrophoresis    Breast Feeding  Breastfeeding benefits discussed which patient does desire  Additional details that I feel the provider should be aware of: has a history of anemia    Interview education  Anticipated course of prenatal care including how and when to contact providers reviewed  HIV and other prenatal labs and testing discussed  Genetic testing discussed  Patient will check insurance coverage  Indications for ultrasonography reviewed  Use of any medications (including supplements, vitamins, herbs or OTC drugs) discussed  Reinforced daily use of prenatal vitamin  Influenza, Covid, and Tdap vaccines counseled and recommendations reviewed  Proper dental care discussed  Weight gain counseling discussed including nutrition counseling; special diet, dietary precautions (mercury, listeriosis)  Reviewed exercise recommendations and routine restrictions for activity including no lifting greater than 20 lbs   Environmental/work hazards reviewed including temperature guidelines and no prolonged stationary standing  Avoidance of saunas, hot tubs, and tanning beds reviewed  Toxoplasmosis precautions (cats/raw meat/unwashed vegetables) discussed  Tobacco/smoking, alcohol, and illicit/recreation drug usage reviewed  Travel safety precautions reviewed including avoiding travel where risk of congenitally transmitted infection(s) is high  Proper seat belt use discussed  Pregnancy packet/folder provided and review with patient  Information on childbirth classes/hospital facilities and Baby and Me resources provided  The patient was oriented to our practice, reviewed delivering physicians and [facility] for Delivery  All questions were answered  Patient to return to the office for first routine prenatal appointment with the provider  This appointment is scheduled       Interviewed by: Anthony Darling LPN

## 2022-09-15 ENCOUNTER — APPOINTMENT (OUTPATIENT)
Dept: LAB | Facility: CLINIC | Age: 27
End: 2022-09-15
Payer: MEDICARE

## 2022-09-15 ENCOUNTER — OB ABSTRACT (OUTPATIENT)
Dept: OBGYN CLINIC | Facility: CLINIC | Age: 27
End: 2022-09-15

## 2022-09-15 DIAGNOSIS — Z34.81 SUPERVISION OF NORMAL INTRAUTERINE PREGNANCY IN MULTIGRAVIDA, FIRST TRIMESTER: ICD-10-CM

## 2022-09-15 LAB
ABO GROUP BLD: NORMAL
BASOPHILS # BLD AUTO: 0.03 THOUSANDS/ΜL (ref 0–0.1)
BASOPHILS NFR BLD AUTO: 0 % (ref 0–1)
BILIRUB UR QL STRIP: NEGATIVE
BLD GP AB SCN SERPL QL: NEGATIVE
CLARITY UR: CLEAR
COLOR UR: NORMAL
EOSINOPHIL # BLD AUTO: 0.12 THOUSAND/ΜL (ref 0–0.61)
EOSINOPHIL NFR BLD AUTO: 1 % (ref 0–6)
ERYTHROCYTE [DISTWIDTH] IN BLOOD BY AUTOMATED COUNT: 14.2 % (ref 11.6–15.1)
EXTERNAL SYPHILIS TOTAL IGG/IGM SCREENING: NONREACTIVE
GLUCOSE UR STRIP-MCNC: NEGATIVE MG/DL
HBV SURFACE AG SER QL: NORMAL
HCT VFR BLD AUTO: 37.7 % (ref 34.8–46.1)
HGB BLD-MCNC: 12.6 G/DL (ref 11.5–15.4)
HGB UR QL STRIP.AUTO: NEGATIVE
IMM GRANULOCYTES # BLD AUTO: 0.05 THOUSAND/UL (ref 0–0.2)
IMM GRANULOCYTES NFR BLD AUTO: 0 % (ref 0–2)
KETONES UR STRIP-MCNC: NEGATIVE MG/DL
LEUKOCYTE ESTERASE UR QL STRIP: NEGATIVE
LYMPHOCYTES # BLD AUTO: 2.67 THOUSANDS/ΜL (ref 0.6–4.47)
LYMPHOCYTES NFR BLD AUTO: 23 % (ref 14–44)
MCH RBC QN AUTO: 27.7 PG (ref 26.8–34.3)
MCHC RBC AUTO-ENTMCNC: 33.4 G/DL (ref 31.4–37.4)
MCV RBC AUTO: 83 FL (ref 82–98)
MONOCYTES # BLD AUTO: 0.6 THOUSAND/ΜL (ref 0.17–1.22)
MONOCYTES NFR BLD AUTO: 5 % (ref 4–12)
NEUTROPHILS # BLD AUTO: 8.23 THOUSANDS/ΜL (ref 1.85–7.62)
NEUTS SEG NFR BLD AUTO: 71 % (ref 43–75)
NITRITE UR QL STRIP: NEGATIVE
NRBC BLD AUTO-RTO: 0 /100 WBCS
PH UR STRIP.AUTO: 6.5 [PH]
PLATELET # BLD AUTO: 378 THOUSANDS/UL (ref 149–390)
PMV BLD AUTO: 9.6 FL (ref 8.9–12.7)
PROT UR STRIP-MCNC: NEGATIVE MG/DL
RBC # BLD AUTO: 4.55 MILLION/UL (ref 3.81–5.12)
RH BLD: POSITIVE
RUBV IGG SERPL IA-ACNC: 25.9 IU/ML
SP GR UR STRIP.AUTO: 1.02 (ref 1–1.03)
SPECIMEN EXPIRATION DATE: NORMAL
UROBILINOGEN UR STRIP-ACNC: <2 MG/DL
WBC # BLD AUTO: 11.7 THOUSAND/UL (ref 4.31–10.16)

## 2022-09-15 PROCEDURE — 81003 URINALYSIS AUTO W/O SCOPE: CPT

## 2022-09-15 PROCEDURE — 36415 COLL VENOUS BLD VENIPUNCTURE: CPT

## 2022-09-15 PROCEDURE — 80081 OBSTETRIC PANEL INC HIV TSTG: CPT

## 2022-09-15 PROCEDURE — 83020 HEMOGLOBIN ELECTROPHORESIS: CPT

## 2022-09-15 PROCEDURE — 87086 URINE CULTURE/COLONY COUNT: CPT

## 2022-09-16 LAB
BACTERIA UR CULT: NORMAL
RPR SER QL: NORMAL

## 2022-09-17 LAB — HIV 1+2 AB+HIV1 P24 AG SERPL QL IA: NORMAL

## 2022-09-20 LAB
HGB A MFR BLD: 2.2 % (ref 1.8–3.2)
HGB A MFR BLD: 97.8 % (ref 96.4–98.8)
HGB F MFR BLD: 0 % (ref 0–2)
HGB FRACT BLD-IMP: NORMAL
HGB S MFR BLD: 0 %

## 2022-09-29 ENCOUNTER — ROUTINE PRENATAL (OUTPATIENT)
Dept: OBGYN CLINIC | Facility: CLINIC | Age: 27
End: 2022-09-29
Payer: MEDICARE

## 2022-09-29 VITALS
BODY MASS INDEX: 29.25 KG/M2 | SYSTOLIC BLOOD PRESSURE: 100 MMHG | HEIGHT: 60 IN | WEIGHT: 149 LBS | HEART RATE: 64 BPM | DIASTOLIC BLOOD PRESSURE: 60 MMHG

## 2022-09-29 DIAGNOSIS — O09.892 HISTORY OF PRETERM DELIVERY, CURRENTLY PREGNANT IN SECOND TRIMESTER: Primary | ICD-10-CM

## 2022-09-29 PROCEDURE — 99213 OFFICE O/P EST LOW 20 MIN: CPT | Performed by: OBSTETRICS & GYNECOLOGY

## 2022-09-29 NOTE — PROGRESS NOTES
27 yo [id number] @ 12w4d  Denies VB, LOF, CONTX  +FHTs    Pregnancy complicated by hx of  delivery, hx of anemia    Normal PN labs    Has appt with WILLIAMM 10/3/22  RTO in 4 weeks, will need AFP at that time

## 2022-10-03 ENCOUNTER — ROUTINE PRENATAL (OUTPATIENT)
Dept: PERINATAL CARE | Facility: OTHER | Age: 27
End: 2022-10-03
Payer: MEDICARE

## 2022-10-03 VITALS
DIASTOLIC BLOOD PRESSURE: 76 MMHG | SYSTOLIC BLOOD PRESSURE: 114 MMHG | BODY MASS INDEX: 29.57 KG/M2 | HEIGHT: 60 IN | HEART RATE: 71 BPM | WEIGHT: 150.6 LBS

## 2022-10-03 DIAGNOSIS — O99.519 ASTHMA DURING PREGNANCY: ICD-10-CM

## 2022-10-03 DIAGNOSIS — Z34.81 SUPERVISION OF NORMAL INTRAUTERINE PREGNANCY IN MULTIGRAVIDA, FIRST TRIMESTER: ICD-10-CM

## 2022-10-03 DIAGNOSIS — J45.909 ASTHMA DURING PREGNANCY: ICD-10-CM

## 2022-10-03 DIAGNOSIS — Z36.82 ENCOUNTER FOR (NT) NUCHAL TRANSLUCENCY SCAN: ICD-10-CM

## 2022-10-03 DIAGNOSIS — Z3A.13 13 WEEKS GESTATION OF PREGNANCY: Primary | ICD-10-CM

## 2022-10-03 PROCEDURE — 36415 COLL VENOUS BLD VENIPUNCTURE: CPT | Performed by: STUDENT IN AN ORGANIZED HEALTH CARE EDUCATION/TRAINING PROGRAM

## 2022-10-03 PROCEDURE — 99242 OFF/OP CONSLTJ NEW/EST SF 20: CPT | Performed by: STUDENT IN AN ORGANIZED HEALTH CARE EDUCATION/TRAINING PROGRAM

## 2022-10-03 PROCEDURE — 76813 OB US NUCHAL MEAS 1 GEST: CPT | Performed by: STUDENT IN AN ORGANIZED HEALTH CARE EDUCATION/TRAINING PROGRAM

## 2022-10-03 NOTE — PROGRESS NOTES
[facility]: Ms Peter Garcia was seen today for nuchal translucency ultrasound  See ultrasound report under "OB Procedures" tab  Review of Systems   Constitutional: Negative for chills, fever and unexpected weight change  HENT: Negative for congestion, dental problem, facial swelling and sore throat  Eyes: Negative for visual disturbance  Respiratory: Negative for cough and shortness of breath  Cardiovascular: Negative for chest pain and palpitations  Gastrointestinal: Negative for diarrhea and vomiting  Endocrine: Negative for polydipsia  Genitourinary: Negative for dysuria and vaginal bleeding  Musculoskeletal: Negative for back pain and joint swelling  Skin: Negative for rash and wound  Allergic/Immunologic: Negative for immunocompromised state  Neurological: Negative for seizures and headaches  Hematological: Does not bruise/bleed easily  Psychiatric/Behavioral: Negative for dysphoric mood, hallucinations and suicidal ideas  Physical Exam  Constitutional:       General: She is not in acute distress  Appearance: Normal appearance  She is normal weight  HENT:      Head: Normocephalic and atraumatic  Eyes:      Extraocular Movements: Extraocular movements intact  Cardiovascular:      Rate and Rhythm: Normal rate  Pulmonary:      Effort: Pulmonary effort is normal  No respiratory distress  Skin:     Findings: No erythema or rash  Neurological:      Mental Status: She is alert and oriented to person, place, and time  Psychiatric:         Mood and Affect: Mood normal          Behavior: Behavior normal          Please don't hesitate to contact our office with any concerns or questions    -Sepideh Chacon

## 2022-10-03 NOTE — PROGRESS NOTES
Patient chose to have [facility] Non-invasive Prenatal Screen  Patient given brochure and is aware [facility] will contact patients insurance and coordinate coverage  Patient made aware she will need to respond to text message or e-mail from [employer] within 2 business days or testing will be run through insurance  Patient informed text message will come from area code  "415"  Provided [address] [place] [address] # [phone] and web site : [email or web address]     2 vials of blood drawn from LT arm, patient tolerated blood draw without difficulty  Specimens labeled with patient identifiers (name, date of birth, specimen collection date), order and specimen was verified with patient, packed and sent via [employer]  Copy of lab order scanned to Epic media  Maternal Fetal Medicine will have results in approximately 7-10 business days and will call patient or notify via [address]  Patient aware viewing lab result online will reveal fetal sex If ordered  Patient verbalized understanding of all instructions and no questions at this time

## 2022-10-03 NOTE — LETTER
October 3, 2022     Duncan Bauman MD  [address]  [address] [facility] [address]    Patient: Elif Walter   YOB: 1995   Date of Visit: 10/3/2022       Dear Dr Ashwini Ford: Thank you for referring Anne Stacy to me for evaluation  Below are my notes for this consultation  If you have questions, please do not hesitate to call me  I look forward to following your patient along with you  Sincerely,        Leander Tatum MD        CC: No Recipients  Leander Tatum MD  10/3/2022  3:24 PM  Sign when Signing Visit  [facility]: Ms Roberta East was seen today for nuchal translucency ultrasound  See ultrasound report under "OB Procedures" tab  Review of Systems   Constitutional: Negative for chills, fever and unexpected weight change  HENT: Negative for congestion, dental problem, facial swelling and sore throat  Eyes: Negative for visual disturbance  Respiratory: Negative for cough and shortness of breath  Cardiovascular: Negative for chest pain and palpitations  Gastrointestinal: Negative for diarrhea and vomiting  Endocrine: Negative for polydipsia  Genitourinary: Negative for dysuria and vaginal bleeding  Musculoskeletal: Negative for back pain and joint swelling  Skin: Negative for rash and wound  Allergic/Immunologic: Negative for immunocompromised state  Neurological: Negative for seizures and headaches  Hematological: Does not bruise/bleed easily  Psychiatric/Behavioral: Negative for dysphoric mood, hallucinations and suicidal ideas  Physical Exam  Constitutional:       General: She is not in acute distress  Appearance: Normal appearance  She is normal weight  HENT:      Head: Normocephalic and atraumatic  Eyes:      Extraocular Movements: Extraocular movements intact  Cardiovascular:      Rate and Rhythm: Normal rate     Pulmonary:      Effort: Pulmonary effort is normal  No respiratory distress  Skin:     Findings: No erythema or rash  Neurological:      Mental Status: She is alert and oriented to person, place, and time  Psychiatric:         Mood and Affect: Mood normal          Behavior: Behavior normal          Please don't hesitate to contact our office with any concerns or questions    -Parker Cazares

## 2022-10-27 ENCOUNTER — ROUTINE PRENATAL (OUTPATIENT)
Dept: OBGYN CLINIC | Facility: CLINIC | Age: 27
End: 2022-10-27
Payer: MEDICARE

## 2022-10-27 ENCOUNTER — APPOINTMENT (OUTPATIENT)
Dept: LAB | Facility: CLINIC | Age: 27
End: 2022-10-27
Payer: MEDICARE

## 2022-10-27 VITALS
HEART RATE: 79 BPM | WEIGHT: 153.6 LBS | SYSTOLIC BLOOD PRESSURE: 98 MMHG | BODY MASS INDEX: 29 KG/M2 | HEIGHT: 61 IN | DIASTOLIC BLOOD PRESSURE: 60 MMHG

## 2022-10-27 DIAGNOSIS — J45.909 ASTHMA DURING PREGNANCY: Primary | ICD-10-CM

## 2022-10-27 DIAGNOSIS — O99.519 ASTHMA DURING PREGNANCY: Primary | ICD-10-CM

## 2022-10-27 DIAGNOSIS — Z3A.16 16 WEEKS GESTATION OF PREGNANCY: ICD-10-CM

## 2022-10-27 PROCEDURE — 99213 OFFICE O/P EST LOW 20 MIN: CPT | Performed by: OBSTETRICS & GYNECOLOGY

## 2022-10-27 PROCEDURE — 82105 ALPHA-FETOPROTEIN SERUM: CPT

## 2022-10-27 PROCEDURE — 36415 COLL VENOUS BLD VENIPUNCTURE: CPT

## 2022-10-27 NOTE — PROGRESS NOTES
29 yo [id number] @ 16w4d  Denies VB, LOF, CONTX    Pregnancy complicated by hx of  delivery, hx of anemia    Normal PN labs    Had appt with MFM 10/3/22 - NIPS testing negative  20 wk U/S scheduled for 22  AFP script given today  Reasons to call office reviewed  RTO in 4 weeks

## 2022-11-01 LAB
2ND TRIMESTER 4 SCREEN SERPL-IMP: NORMAL
AFP ADJ MOM SERPL: 1.4
AFP INTERP AMN-IMP: NORMAL
AFP INTERP SERPL-IMP: NORMAL
AFP INTERP SERPL-IMP: NORMAL
AFP SERPL-MCNC: 52.4 NG/ML
AGE AT DELIVERY: 28.6 YR
GA METHOD: NORMAL
GA: 16.6 WEEKS
IDDM PATIENT QL: NO
MULTIPLE PREGNANCY: NO
NEURAL TUBE DEFECT RISK FETUS: 3600 %

## 2022-11-20 NOTE — PROGRESS NOTES
Zofia Ortiz  has no complaints today at 19w6d  She reports fetal movements and does not report any vaginal bleeding or signs of labor  Her recently completed fetal testing revealed a nml NIPT and MSAFP  I called and left a message and sent her a [employer] message as I was not immediately available to see her at the end of her appointment today  Ultrasound findings: The ultrasound today shows normal interval fetal growth and fluid, normal cervical length, and no malformations were detected  Pregnancy ultrasound has limitations and is unable to detect all forms of fetal congenital abnormalities  Follow up recommended:   In review of her 1st 8 week ultrasound it was off by more than 5 days suggesting that we should be using her due date based on her 8 week ultrasound and not her last menstrual period  The due date by her 8 week ultrasound is 4/3/23  No further follow-up ultrasounds are recommended at this time         Ruthie Michelle MD

## 2022-11-21 ENCOUNTER — ROUTINE PRENATAL (OUTPATIENT)
Dept: PERINATAL CARE | Facility: OTHER | Age: 27
End: 2022-11-21

## 2022-11-21 VITALS
BODY MASS INDEX: 29.64 KG/M2 | DIASTOLIC BLOOD PRESSURE: 70 MMHG | SYSTOLIC BLOOD PRESSURE: 114 MMHG | HEART RATE: 67 BPM | WEIGHT: 151 LBS | HEIGHT: 60 IN

## 2022-11-21 DIAGNOSIS — Z3A.20 20 WEEKS GESTATION OF PREGNANCY: ICD-10-CM

## 2022-11-21 DIAGNOSIS — Z36.3 ENCOUNTER FOR ANTENATAL SCREENING FOR MALFORMATION: Primary | ICD-10-CM

## 2022-11-21 DIAGNOSIS — Z36.86 ENCOUNTER FOR ANTENATAL SCREENING FOR CERVICAL LENGTH: ICD-10-CM

## 2022-11-21 NOTE — LETTER
November 21, 2022     Qian Ann MD  [address]  [address] [address]    Patient: Mendel Ochs   YOB: 1995   Date of Visit: 11/21/2022       Dear Dr Alexandra Hardy: Thank you for referring Lima Mathis to me for evaluation  Below are my notes for this consultation  If you have questions, please do not hesitate to call me  I look forward to following your patient along with you  Sincerely,        Samantha Amaya MD        CC: No Recipients  Samantha Amaya MD  11/21/2022  5:59 PM  Sign when Signing Visit  Mendel Ochs  has no complaints today at 19w6d  She reports fetal movements and does not report any vaginal bleeding or signs of labor  Her recently completed fetal testing revealed a nml NIPT and MSAFP  I called and left a message and sent her a MD.Voice message as I was not immediately available to see her at the end of her appointment today  Ultrasound findings: The ultrasound today shows normal interval fetal growth and fluid, normal cervical length, and no malformations were detected  Pregnancy ultrasound has limitations and is unable to detect all forms of fetal congenital abnormalities  Follow up recommended:   In review of her 1st 8 week ultrasound it was off by more than 5 days suggesting that we should be using her due date based on her 8 week ultrasound and not her last menstrual period  The due date by her 8 week ultrasound is 4/3/23  No further follow-up ultrasounds are recommended at this time         Samantha Amaya MD

## 2022-11-21 NOTE — PROGRESS NOTES
Ultrasound Probe Disinfection    A transvaginal ultrasound was performed  Prior to use, disinfection was performed with High Level Disinfection Process (Trophon)  Probe serial number S2: [id number] was used        Mian Garsia  11/21/22  2:01 PM

## 2022-12-20 ENCOUNTER — ROUTINE PRENATAL (OUTPATIENT)
Dept: OBGYN CLINIC | Facility: CLINIC | Age: 27
End: 2022-12-20

## 2022-12-20 VITALS
SYSTOLIC BLOOD PRESSURE: 122 MMHG | DIASTOLIC BLOOD PRESSURE: 68 MMHG | HEART RATE: 72 BPM | HEIGHT: 60 IN | BODY MASS INDEX: 30.7 KG/M2 | WEIGHT: 156.4 LBS

## 2022-12-20 DIAGNOSIS — O99.519 ASTHMA DURING PREGNANCY: Primary | ICD-10-CM

## 2022-12-20 DIAGNOSIS — Z3A.25 25 WEEKS GESTATION OF PREGNANCY: ICD-10-CM

## 2022-12-20 DIAGNOSIS — J45.909 ASTHMA DURING PREGNANCY: Primary | ICD-10-CM

## 2022-12-20 NOTE — PROGRESS NOTES
Pt is a 28 y o  [id number] 25w1d  Pregnancy is complicated by asthma  Reviewed EDC change as per MFM recommendations and pt is in agreement  Pt reports +FM  Denies vb, lof, ctx  PTL precautions reviewed  Has not had influenza vaccination yet, would like to wait until next visit (after the holidays)  3d trimester lab slips given  Contraceptive options reviewed and patient desires Nexplanon  Advised she may have this placed at the hospital with her insurance--she will consider  F/u 3 weeks

## 2023-01-11 ENCOUNTER — TELEPHONE (OUTPATIENT)
Dept: OBGYN CLINIC | Facility: CLINIC | Age: 28
End: 2023-01-11

## 2023-01-11 NOTE — TELEPHONE ENCOUNTER
Pt reports intermittent abd cramping/pain that Started around  3 am last night after intercourse  no bleeding or leakage  No other symptoms reported  Please advise

## 2023-01-11 NOTE — TELEPHONE ENCOUNTER
Advise her to drink four 8 oz glasses of water while resting for one hour  If she continues to feel cramping pain more than 4 times during the 2nd hour AFTER resting and drinking she should call us, even if after hours  *also ask her if she' constipated, as this can cause cramping

## 2023-01-12 ENCOUNTER — HOSPITAL ENCOUNTER (OUTPATIENT)
Facility: HOSPITAL | Age: 28
Discharge: HOME | End: 2023-01-12
Attending: OBSTETRICS & GYNECOLOGY | Admitting: OBSTETRICS & GYNECOLOGY

## 2023-01-12 ENCOUNTER — PATIENT MESSAGE (OUTPATIENT)
Dept: OBGYN CLINIC | Facility: CLINIC | Age: 28
End: 2023-01-12

## 2023-01-12 VITALS
DIASTOLIC BLOOD PRESSURE: 66 MMHG | HEIGHT: 60 IN | WEIGHT: 156.4 LBS | BODY MASS INDEX: 30.7 KG/M2 | TEMPERATURE: 98.6 F | RESPIRATION RATE: 16 BRPM | SYSTOLIC BLOOD PRESSURE: 122 MMHG

## 2023-01-12 PROBLEM — Z3A.28 28 WEEKS GESTATION OF PREGNANCY: Status: ACTIVE | Noted: 2022-12-20

## 2023-01-12 NOTE — PROCEDURES
[address] [id number] at 28w3d with an JILLIAN of 4/3/2023, by Ultrasound, was seen at [address] for the following procedure(s): $Procedure Type: US - Transvaginal]                       Ultrasound Other  Fetal Presentation: Vertex  Cervical Length: 3 5  Funnel: No  Debris: No  Placenta Previa: No  Vasa Previa: No         Veronika Waite DO  01/12/23  6:09 PM

## 2023-01-12 NOTE — PROGRESS NOTES
L&D Triage Note - OB/GYN  Simon Smith 28 y o  female MRN: [id number]  Unit/Bed#: LD TRIAGE  Encounter: [id number]      ASSESSMENT:    Simon Smith is a 28 y o   at 28w3d presenting for abdomianl cramping  No concern for  labor at this time  D/c with return precautions and follow up next week in office  Encouraged tylenol, pelvic rest and adequate hydration  PLAN:    1) r/o PTL   - Reports mild abdominal discomfort, improved since onset  - Speculum examination: cervical os appears visually closed, no vaginal bleeding or pooling noted, minimal amount of white vaginal discharge noted  - Nitrazine negative, Slides negative for ferning, clue cells, hyphae, trichomonads on microscopy  - FFN: collected (not sent)  - TVUS revelaed 3 5 cm  - SVE: 0 5/0/-4  - FHT: Reactive NST, one variable present on FHT during 1 hour of monitoring  - [place]: no contractions present    Discharge Instructions:   - Continue routine prenatal care  - Discharge from [facility] triage with  labor precautions    - Reviewed rupture of membranes, false vs true labor, decreased fetal movement, and vaginal bleeding   - Pt to call provider with any concerns and follow up at her next scheduled prenatal appointment on 22 at 1400    - Case discussed with Dr Cleveland Gregory:    Simon Smith 28 y o  [id number] at 28w3d with an Estimated Date of Delivery: 4/3/23     Presents to triage this afternoon for lower abdominal cramping  Reports that pain started yesterday morning around 3 AM after she and her partner had intercourse  She reports that cramping was present throughout the day yesterday and she tried to adequately hydrate  She states that the discomfort persisted throughout the day today as well  She denies any leaking fluids or vaginal bleeding   Reports g    Denies fever, chills, chest pain, shortness of breath, dysuria, hematuria, diarrhea, constipation or any other complaints at this time    Her past obstetrical history is significant for 2 prior term  (, ), PT 7lb 3 oz    Contractions: denies  Leakage of fluid: deines  Vaginal Bleeding: denies  Fetal movement: present    OBJECTIVE:    Vitals:    23 1700   BP: 122/66   Resp:    Temp:        ROS:  Constitutional: Negative  Respiratory: Negative  Cardiovascular: Negative    Gastrointestinal: Negative    General Physical Exam:  General: in no apparent distress, non-toxic, alert and normal vitals  Cardiovascular: Cor RRR  Lungs: non-labored breathing  Abdomen: abdomen is soft without significant tenderness, masses, organomegaly or guarding  Lower extremeties: nontender    Cervical Exam (Chaperone present)  Speculum: Speculum examination: cervical os appears visually closed, no vaginal bleeding or pooling noted, minimal amount of white vaginal discharge noted    SVE: Cervical Dilation: Fingertip  Cervical Effacement: 0  Fetal Station: Ballotable  Method: Manual  OB Examiner: Penny Singh    Fetal monitoring:    FHT:  Baseline Rate: 140 bpm  Variability: Moderate 6-25 bpm  Accelerations: 10 x 10 (<32 weeks)  Decelerations: Variable, Episodic, Intermittent    TOCO:   Contraction Frequency (minutes): none  Contraction Quality: Not applicable    KOH/WTMT:     Infection:   - nop clue cells    - no hyphae   - no trichomonads present    Membrane status   - no ferning   - negative Nitrazine   - no pooling     Urine Dip    - negative for all components    Imaging:       TVUS   - Cervical length    - 3 42 cm    - 3 34 cm    - 3 54 cm   - Presentation: pearl Solares, DO   OBGYN PGY-1  2023 5:21 PM

## 2023-01-12 NOTE — PATIENT COMMUNICATION
Provider Gautam Bennett) made aware, pt was advised to go to [address] [place] [address] for evaluation  Pt verbalized understanding

## 2023-01-16 ENCOUNTER — APPOINTMENT (OUTPATIENT)
Dept: LAB | Facility: CLINIC | Age: 28
End: 2023-01-16

## 2023-01-16 DIAGNOSIS — Z3A.25 25 WEEKS GESTATION OF PREGNANCY: ICD-10-CM

## 2023-01-16 DIAGNOSIS — O99.519 ASTHMA DURING PREGNANCY: ICD-10-CM

## 2023-01-16 DIAGNOSIS — J45.909 ASTHMA DURING PREGNANCY: ICD-10-CM

## 2023-01-16 LAB
ERYTHROCYTE [DISTWIDTH] IN BLOOD BY AUTOMATED COUNT: 12.9 % (ref 11.6–15.1)
GLUCOSE 1H P 50 G GLC PO SERPL-MCNC: 94 MG/DL (ref 40–134)
HCT VFR BLD AUTO: 32 % (ref 34.8–46.1)
HGB BLD-MCNC: 10.2 G/DL (ref 11.5–15.4)
MCH RBC QN AUTO: 26.2 PG (ref 26.8–34.3)
MCHC RBC AUTO-ENTMCNC: 31.9 G/DL (ref 31.4–37.4)
MCV RBC AUTO: 82 FL (ref 82–98)
PLATELET # BLD AUTO: 374 THOUSANDS/UL (ref 149–390)
PMV BLD AUTO: 9.3 FL (ref 8.9–12.7)
RBC # BLD AUTO: 3.89 MILLION/UL (ref 3.81–5.12)
WBC # BLD AUTO: 11.71 THOUSAND/UL (ref 4.31–10.16)

## 2023-01-17 ENCOUNTER — ROUTINE PRENATAL (OUTPATIENT)
Dept: OBGYN CLINIC | Facility: CLINIC | Age: 28
End: 2023-01-17

## 2023-01-17 VITALS
DIASTOLIC BLOOD PRESSURE: 60 MMHG | SYSTOLIC BLOOD PRESSURE: 110 MMHG | BODY MASS INDEX: 30.39 KG/M2 | HEART RATE: 74 BPM | HEIGHT: 60 IN | WEIGHT: 154.8 LBS

## 2023-01-17 DIAGNOSIS — O99.013 ANEMIA AFFECTING PREGNANCY IN THIRD TRIMESTER: Primary | ICD-10-CM

## 2023-01-17 LAB — RPR SER QL: NORMAL

## 2023-01-17 RX ORDER — METHYLDOPA 500 MG
TABLET ORAL
Qty: 90 TABLET | Refills: 1 | Status: SHIPPED | OUTPATIENT
Start: 2023-01-17

## 2023-01-17 NOTE — PATIENT INSTRUCTIONS
Kick Counts in Pregnancy   WHAT YOU NEED TO KNOW:   Kick counts measure how much your baby is moving in your womb  A kick from your baby can be felt as a twist, turn, swish, roll, or jab  It is common to feel your baby kicking at 26 to 28 weeks of pregnancy  You may feel your baby kick as early as 20 weeks of pregnancy  You may want to start counting at 28 weeks  DISCHARGE INSTRUCTIONS:   Contact your doctor immediately if:   You feel a change in the number of kicks or movements of your baby  You feel fewer than 10 kicks within 2 hours  You have questions or concerns about your baby's movements  Why measure kick counts:  Your baby's movement may provide information about your baby's health  He or she may move less, or not at all, if there are problems  Your baby may move less if he or she is not getting enough oxygen or nutrition from the placenta  Do not smoke while you are pregnant  Smoking decreases the amount of oxygen that gets to your baby  Talk to your healthcare provider if you need help to quit smoking  Tell your healthcare provider as soon as you feel a change in your baby's movements  When to measure kick counts:   Measure kick counts at the same time every day  Measure kick counts when your baby is awake and most active  Your baby may be most active in the evening  How to measure kick counts:  Check that your baby is awake before you measure kick counts  You can wake up your baby by lightly pushing on your belly, walking, or drinking something cold  Your healthcare provider may tell you different ways to measure kick counts  You may be told to do the following:  Use a chart or clock to keep track of the time you start and finish counting  Sit in a chair or lie on your left side  Place your hands on the largest part of your belly  Count until you reach 10 kicks  Write down how much time it takes to count 10 kicks  It may take 30 minutes to 2 hours to count 10 kicks  It should not take more than 2 hours to count 10 kicks  Follow up with your doctor as directed:  Write down your questions so you remember to ask them during your visits  © Copyright "[employer]"  Information is for End User's use only and may not be sold, redistributed or otherwise used for commercial purposes  All illustrations and images included in CareNotes® are the copyrighted property of A D A M , Inc  or Nneka Guerin   The above information is an  only  It is not intended as medical advice for individual conditions or treatments  Talk to your doctor, nurse or pharmacist before following any medical regimen to see if it is safe and effective for you   Labor   WHAT YOU NEED TO KNOW:    (premature) labor occurs when the uterus contracts and your cervix opens earlier than normal  The cervix is the opening of your uterus   labor happens after the 20th week of pregnancy but before the 37th week  You may have premature rupture of membranes (PROM)  PROM means your water broke before labor began  An early labor could cause you to have your baby before he or she is ready to be born  DISCHARGE INSTRUCTIONS:   Call your local emergency number (911 in the [facility] [facility]) if:   You see or feel like there is something in your vagina  Call your doctor if:   You have bright red, painless vaginal bleeding  Your symptoms do not get better or they get worse  Your water broke or you feel warm water gushing or trickling from your vagina  You have contractions that get stronger and closer together for more than 1 hour  You notice a decrease in your baby's movement  You have abdominal cramps, pressure, or tightening  You have a change in vaginal discharge  You have a fever  You have burning when you urinate or you are urinating less than is normal for you  You have questions or concerns about your condition or care      Medicines:   Antibiotics  may be given to treat a bacterial infection  Take your medicine as directed  Contact your healthcare provider if you think your medicine is not helping or if you have side effects  Tell him or her if you are allergic to any medicine  Keep a list of the medicines, vitamins, and herbs you take  Include the amounts, and when and why you take them  Bring the list or the pill bottles to follow-up visits  Carry your medicine list with you in case of an emergency  Self-care:   Rest  as much as possible  You may need to lie on your left side to improve circulation to the uterus and baby  You may be able to prevent  labor by resting and reducing your physical activity  Ask your healthcare provider about activities that are safe for you to do  Your healthcare provider or obstetrician may recommend that you avoid sexual intercourse  Ask your healthcare provider if exercise is safe  Drink liquids as directed  Ask how much liquid to drink each day and which liquids are best for you  Do not smoke  Your baby may not grow well and he or she may weigh less at birth if you smoke during pregnancy  Smoking also increases the risk that your baby will be born too early  Nicotine and other chemicals in cigarettes and cigars can cause lung damage  Ask your healthcare provider for information if you currently smoke and need help to quit  E-cigarettes or smokeless tobacco still contain nicotine  Talk to your healthcare provider before you use these products  Do not drink alcohol  Alcohol may harm your unborn baby and cause  labor  Maintain a healthy weight  A healthy weight may prevent  labor  Ask your healthcare provider how much weight you should gain during your pregnancy  Follow up with your doctor as directed:  Write down your questions so you remember to ask them during your visits    © Copyright [employer]  Information is for End User's use only and may not be sold, redistributed or otherwise used for commercial purposes  All illustrations and images included in CareNotes® are the copyrighted property of A D A M , Inc  or Nneka Mayorga  The above information is an  only  It is not intended as medical advice for individual conditions or treatments  Talk to your doctor, nurse or pharmacist before following any medical regimen to see if it is safe and effective for you

## 2023-01-17 NOTE — PROGRESS NOTES
27 yo  at 29w 1d gestation; h/o asthma, h/o anemia  23 seen in L&D to r/o labor-- all w/u negative  28w labs: 1h GTT 94; h/h 10 2 & 32%, rpr NR  She will add iron supplement, rx sent  Baby is active, denies leakage of fluid, vaginal bleeding or uterine contractions    S=D, normal FHTs, normal BP    FKCs/ PTL precautions provided  RV 2w  Desires Nexplanon--unsure if desires after PP visit or in hosptial

## 2023-01-27 ENCOUNTER — NURSE TRIAGE (OUTPATIENT)
Dept: OTHER | Facility: OTHER | Age: 28
End: 2023-01-27

## 2023-01-28 ENCOUNTER — HOSPITAL ENCOUNTER (OUTPATIENT)
Facility: HOSPITAL | Age: 28
Discharge: HOME | End: 2023-01-28
Attending: OBSTETRICS & GYNECOLOGY | Admitting: OBSTETRICS & GYNECOLOGY

## 2023-01-28 VITALS
TEMPERATURE: 98.1 F | DIASTOLIC BLOOD PRESSURE: 64 MMHG | SYSTOLIC BLOOD PRESSURE: 121 MMHG | HEART RATE: 77 BPM | RESPIRATION RATE: 16 BRPM

## 2023-01-28 NOTE — DISCHARGE INSTRUCTIONS
Pregnancy at 27 to 30 [address]:   You may notice new symptoms such as shortness of breath, heartburn, or swelling of your ankles and feet  You may also have trouble sleeping or contractions  DISCHARGE INSTRUCTIONS:   Return to the emergency department if:   You develop a severe headache that does not go away  You have new or increased vision changes, such as blurred or spotted vision  You have new or increased swelling in your face or hands  You have vaginal spotting or bleeding  Your water broke or you feel warm water gushing or trickling from your vagina  Call your doctor or obstetrician if:   You have more than 5 contractions in 1 hour  You notice any changes in your baby's movements  You have abdominal cramps, pressure, or tightening  You have a change in vaginal discharge  You have chills or a fever  You have vaginal itching, burning, or pain  You have yellow, green, white, or foul-smelling vaginal discharge  You have pain or burning when you urinate, less urine than usual, or pink or bloody urine  You have questions or concerns about your condition or care  How to care for yourself at this stage of your pregnancy:       Eat a variety of healthy foods  Healthy foods include fruits, vegetables, whole-grain breads, low-fat dairy foods, beans, lean meats, and fish  Drink liquids as directed  Ask how much liquid to drink each day and which liquids are best for you  Limit caffeine to less than 200 milligrams each day  Limit your intake of fish to 2 servings each week  Choose fish low in mercury such as canned light tuna, shrimp, salmon, cod, or tilapia  Do not  eat fish high in mercury such as swordfish, tilefish, baudilio mackerel, and shark  Manage heartburn  by eating 4 or 5 small meals each day instead of large meals  Avoid spicy food  Manage swelling  by lying down and putting your feet up  Take prenatal vitamins as directed  Your need for certain vitamins and minerals, such as folic acid, increases during pregnancy  Prenatal vitamins provide some of the extra vitamins and minerals you need  Prenatal vitamins may also help to decrease the risk of certain birth defects  Talk to your healthcare provider about exercise  Moderate exercise can help you stay fit  Your healthcare provider will help you plan an exercise program that is safe for you during pregnancy  Do not smoke  Smoking increases your risk of a miscarriage and other health problems during your pregnancy  Smoking can cause your baby to be born too early or weigh less at birth  Ask your healthcare provider for information if you need help quitting  Do not drink alcohol  Alcohol passes from your body to your baby through the placenta  It can affect your baby's brain development and cause fetal alcohol syndrome (FAS)  FAS is a group of conditions that causes mental, behavior, and growth problems  Talk to your healthcare provider before you take any medicines  Many medicines may harm your baby if you take them when you are pregnant  Do not take any medicines, vitamins, herbs, or supplements without first talking to your healthcare provider  Never use illegal or street drugs (such as marijuana or cocaine) while you are pregnant  Safety tips during pregnancy:   Avoid hot tubs and saunas  Do not use a hot tub or sauna while you are pregnant, especially during your first trimester  Hot tubs and saunas may raise your baby's temperature and increase the risk of birth defects  Avoid toxoplasmosis  This is an infection caused by eating raw meat or being around infected cat feces  It can cause birth defects, miscarriages, and other problems  Wash your hands after you touch raw meat  Make sure any meat is well-cooked before you eat it  Avoid raw eggs and unpasteurized milk   Use gloves or ask someone else to clean your cat's litter box while you are pregnant  Changes that are happening with your baby:  By 30 weeks, your baby may weigh more than 3 pounds  Your baby may be about 11 inches long from the top of the head to the rump (baby's bottom)  Your baby's eyes open and close now  Your baby's kicks and movements are more forceful at this time  What you need to know about prenatal care: Your healthcare provider will check your blood pressure and weight  You may also need the following:  Blood tests  may be done to check for anemia or blood type  A urine test  may also be done to check for sugar and protein  These can be signs of gestational diabetes or infection  Protein in your urine may also be a sign of preeclampsia  Preeclampsia is a condition that can develop during week 20 or later of your pregnancy  It causes high blood pressure, and it can cause problems with your kidneys and other organs  A Tdap vaccine and flu vaccine  may be recommended by your healthcare provider  A gestational diabetes screen  may be done  Your healthcare provider may order either a 1-step or 2-step oral glucose tolerance test (OGTT)  1-step OGTT:  Your blood sugar level will be tested after you have not eaten for 8 hours (fasting)  You will then be given a glucose drink  Your level will be tested again 1 hour and 2 hours after you finish the drink  2-step OGTT:  You do not have to fast for the first part of the test  You will have the glucose drink at any time of day  Your blood sugar level will be checked 1 hour later  If your blood sugar is higher than a certain level, another test will be ordered  You will fast and your blood sugar level will be tested  You will have the glucose drink  Your blood will be tested again 1 hour, 2 hours, and 3 hours after you finish the glucose drink  Fundal height  is a measurement of your uterus to check your baby's growth  This number is usually the same as the number of weeks that you have been pregnant   Your healthcare provider may also check your baby's position  Your baby's heart rate  will be checked  Follow up with your doctor or obstetrician as directed:  Write down your questions so you remember to ask them during your visits  © Copyright [employer] 2022 Information is for End User's use only and may not be sold, redistributed or otherwise used for commercial purposes  All illustrations and images included in CareNotes® are the copyrighted property of A D A M , Inc  or [facility] Maddy Guerin   The above information is an  only  It is not intended as medical advice for individual conditions or treatments  Talk to your doctor, nurse or pharmacist before following any medical regimen to see if it is safe and effective for you

## 2023-01-28 NOTE — TELEPHONE ENCOUNTER
Reason for Disposition  • [1] Pregnant 23 or more weeks AND [2] baby moving less today by kick count  (e g , kick count < 5 in 1 hour or < 10 in 2 hours)    Answer Assessment - Initial Assessment Questions  1  FETAL MOVEMENT: "Has the baby's movement decreased or changed significantly from normal?" (e g , yes, no; describe)    Decreased  2  JILLIAN: "What date are you expecting to deliver?"     5/1/23  3   PREGNANCY: "How many weeks pregnant are you?"     30W4D  4  OTHER SYMPTOMS: "Do you have any other symptoms?" (e g , abdominal pain, leaking fluid from vagina, vaginal bleeding, etc )    Light cramping    Protocols used: PREGNANCY - DECREASED OR ABNORMAL FETAL MOVEMENT-ADULT-AH

## 2023-01-28 NOTE — TELEPHONE ENCOUNTER
Regarding: decreased fetal movement  ----- Message from Haris Farrell sent at 1/27/2023 11:24 PM EST -----  "im 30 weeks pregnant, neelima been trying to do kick counts all day and neelima noticed the baby isnt as active as usual"

## 2023-01-28 NOTE — PROCEDURES
[address] [facility] [id number] at 30w5d with an JILLIAN of 4/3/2023, by Ultrasound, was seen at [facility] for the following procedure(s): $Procedure Type: ANGELIC]         4 Quadrant ANGELIC  ANGELIC Q1 (cm): 5 3 cm  ANGELIC Q2 (cm): 2 2 cm  ANGELIC Q3 (cm): 3 3 cm  ANGELIC Q4 (cm): 2 7 cm  ANGELIC TOTAL (cm): 13 5 cm         Ultrasound Other  Fetal Presentation: Vertex                 D/w Dr Rama Langford, PGY3

## 2023-01-28 NOTE — TELEPHONE ENCOUNTER
Gestation:30W4D  JILLIAN: 4/3/23     Decreased FM since , with <5 kicks/hour, and persisting into day  Also reports light cramping  No additional symptoms  On call provider contacted and informed of patient’s concerns and status  Provider recommends proceeding to L&D    Patient informed of provider’s recommendation  Verbalized understanding and agreeable with disposition  No further questions  Thai L&D charge nurse notified

## 2023-02-01 ENCOUNTER — ROUTINE PRENATAL (OUTPATIENT)
Dept: OBGYN CLINIC | Facility: CLINIC | Age: 28
End: 2023-02-01

## 2023-02-01 VITALS
WEIGHT: 159.4 LBS | DIASTOLIC BLOOD PRESSURE: 62 MMHG | HEIGHT: 60 IN | SYSTOLIC BLOOD PRESSURE: 100 MMHG | HEART RATE: 80 BPM | BODY MASS INDEX: 31.29 KG/M2

## 2023-02-01 DIAGNOSIS — O09.893 HISTORY OF PRETERM DELIVERY, CURRENTLY PREGNANT IN THIRD TRIMESTER: Primary | ICD-10-CM

## 2023-02-01 DIAGNOSIS — O99.013 ANEMIA AFFECTING PREGNANCY IN THIRD TRIMESTER: ICD-10-CM

## 2023-02-01 NOTE — PROGRESS NOTES
29 yo  at [place] 2d gestation; h/o asthma, h/o anemia  Seen in L&D on 23 for c/o decreased FM  NST reactive, nl ANGELIC  Iron supplement- has not started it yet; will do so  Baby is active, denies leakage of fluid, vaginal bleeding or uterine contractions  S=D, normal FHTs, normal BP     Next appt 2/15/23 w/ Dr Stephania Faulkner-- needs TDAP & consents at that visit  Aware of FKCs/PTL precautions    Desires Nexplanon- not sure if PP or in hospital

## 2023-02-15 ENCOUNTER — ROUTINE PRENATAL (OUTPATIENT)
Dept: OBGYN CLINIC | Facility: CLINIC | Age: 28
End: 2023-02-15

## 2023-02-15 VITALS
BODY MASS INDEX: 31.14 KG/M2 | WEIGHT: 158.6 LBS | HEART RATE: 72 BPM | DIASTOLIC BLOOD PRESSURE: 80 MMHG | HEIGHT: 60 IN | SYSTOLIC BLOOD PRESSURE: 106 MMHG

## 2023-02-15 DIAGNOSIS — O99.013 ANEMIA AFFECTING PREGNANCY IN THIRD TRIMESTER: ICD-10-CM

## 2023-02-15 DIAGNOSIS — O99.519 ASTHMA DURING PREGNANCY: Primary | ICD-10-CM

## 2023-02-15 DIAGNOSIS — O09.893 HISTORY OF PRETERM DELIVERY, CURRENTLY PREGNANT IN THIRD TRIMESTER: ICD-10-CM

## 2023-02-15 DIAGNOSIS — Z3A.33 33 WEEKS GESTATION OF PREGNANCY: ICD-10-CM

## 2023-02-15 DIAGNOSIS — Z23 NEED FOR TDAP VACCINATION: ICD-10-CM

## 2023-02-15 DIAGNOSIS — J45.909 ASTHMA DURING PREGNANCY: Primary | ICD-10-CM

## 2023-02-15 NOTE — PROGRESS NOTES
Pt is a 28 y o  [id number] 33w2d  Pregnancy is complicated by h o asthma, anemia, h o PTD  Pt reports she has started iron  Pt reports +FM  Denies vb, lof, ctx  PTL precautions and fkc reviewed  TDAP today  3d trimester folder reviewed and given  Consents reviewed and signed

## 2023-03-01 ENCOUNTER — ROUTINE PRENATAL (OUTPATIENT)
Dept: OBGYN CLINIC | Facility: CLINIC | Age: 28
End: 2023-03-01

## 2023-03-01 VITALS
DIASTOLIC BLOOD PRESSURE: 72 MMHG | BODY MASS INDEX: 31.41 KG/M2 | SYSTOLIC BLOOD PRESSURE: 112 MMHG | WEIGHT: 160 LBS | HEIGHT: 60 IN

## 2023-03-01 DIAGNOSIS — Z3A.35 35 WEEKS GESTATION OF PREGNANCY: ICD-10-CM

## 2023-03-01 DIAGNOSIS — O09.893 HISTORY OF PRETERM DELIVERY, CURRENTLY PREGNANT IN THIRD TRIMESTER: Primary | ICD-10-CM

## 2023-03-01 DIAGNOSIS — O99.013 ANEMIA AFFECTING PREGNANCY IN THIRD TRIMESTER: ICD-10-CM

## 2023-03-01 DIAGNOSIS — O99.519 ASTHMA DURING PREGNANCY: ICD-10-CM

## 2023-03-01 DIAGNOSIS — J45.909 ASTHMA DURING PREGNANCY: ICD-10-CM

## 2023-03-01 NOTE — PROGRESS NOTES
Pt is a 28 y o  [id number] 35w2d  Pregnancy is complicated by h o asthma, anemia and h o PTD  Pt reports +FM  Denies vb, lof  Notes increasing, but irregular ctx   PTL precautions and fkc reviewed  GBS collected today  SVE:2/thick/-3, posterior  Birth plan reviewed and signed  F/u 1 week

## 2023-03-03 LAB — GP B STREP DNA SPEC QL NAA+PROBE: NEGATIVE

## 2023-03-06 ENCOUNTER — HOSPITAL ENCOUNTER (OUTPATIENT)
Facility: HOSPITAL | Age: 28
Discharge: HOME | End: 2023-03-06
Attending: OBSTETRICS & GYNECOLOGY | Admitting: OBSTETRICS & GYNECOLOGY

## 2023-03-06 ENCOUNTER — TELEPHONE (OUTPATIENT)
Dept: OBGYN CLINIC | Facility: CLINIC | Age: 28
End: 2023-03-06

## 2023-03-06 VITALS
HEART RATE: 75 BPM | SYSTOLIC BLOOD PRESSURE: 111 MMHG | RESPIRATION RATE: 16 BRPM | DIASTOLIC BLOOD PRESSURE: 57 MMHG | TEMPERATURE: 99.4 F

## 2023-03-06 PROBLEM — Z3A.36 36 WEEKS GESTATION OF PREGNANCY: Status: ACTIVE | Noted: 2022-12-20

## 2023-03-06 NOTE — TELEPHONE ENCOUNTER
FYI:  Patient is 36 weeks today  She has been having cramping today, around every 20 mins  , and they last around 30 seconds  She said she will keep an eye on on things

## 2023-03-06 NOTE — PROGRESS NOTES
L&D Triage Note - OB/GYN  Naomi Martínez 28 y o  female MRN: [id number]  Unit/Bed#: L&D 324-01 Encounter: [id number]      ASSESSMENT:    Naomi Martínez is a 28 y o  [id number] at 36w0d presenting with contractions    PLAN:    1) Contractions   -SVE 2 5/50/-3->f/u 2hr recheck    -Signed out to night team    SUBJECTIVE:    Naomi Martínez 28 y o  [id number] at 36w0d with an Estimated Date of Delivery: 4/3/23 presenting with contractions that started at 5 am  She says they are every 15 to 20 minutes but they are strong when they occur  Her OB hx is notable for 3 prior term vaginal deliveries  This pregnancy has been fairly uncomplicated        Contractions: every 15-20 minutes  Leakage of fluid: none  Vaginal Bleeding: none  Fetal movement: present    OBJECTIVE:    Vitals:    03/06/23 1636   BP: 111/57   Pulse: 75   Resp: 16   Temp: 99 4 °F (37 4 °C)       ROS:  Constitutional: Negative  Respiratory: Negative  Cardiovascular: Negative    Gastrointestinal: Negative    General Physical Exam:  General: in no apparent distress and well developed and well nourished  Cardiovascular: Cor RRR  Lungs: non-labored breathing  Abdomen: abdomen is soft without significant tenderness, masses, organomegaly or guarding  Lower extremeties: nontender    Cervical Exam  Speculum: deferred  SVE: 2 5 / 50% / -3    Fetal monitoring:  FHT:  145 bpm/ Moderate 6 - 25 bpm / 15 x 15 accelerations present, no decelerations  [place]: contractions every 15 to 20 minutes         Kaylene Smart MD,  OBGYN PGY-4  3/6/2023 4:43 PM

## 2023-03-06 NOTE — TELEPHONE ENCOUNTER
Pt called reports back pain with cramping every 15 min apart that started at 5 am this morning  Provider notified who advised pt to L&d for evaluation and tx   Pt notified

## 2023-03-15 ENCOUNTER — ROUTINE PRENATAL (OUTPATIENT)
Dept: OBGYN CLINIC | Facility: CLINIC | Age: 28
End: 2023-03-15

## 2023-03-15 VITALS
WEIGHT: 161.4 LBS | DIASTOLIC BLOOD PRESSURE: 62 MMHG | HEIGHT: 60 IN | SYSTOLIC BLOOD PRESSURE: 110 MMHG | BODY MASS INDEX: 31.69 KG/M2 | HEART RATE: 73 BPM

## 2023-03-15 DIAGNOSIS — O99.013 ANEMIA, ANTEPARTUM, THIRD TRIMESTER: Primary | ICD-10-CM

## 2023-03-15 DIAGNOSIS — Z3A.36 36 WEEKS GESTATION OF PREGNANCY: ICD-10-CM

## 2023-03-15 DIAGNOSIS — J45.909 ASTHMA DURING PREGNANCY: ICD-10-CM

## 2023-03-15 DIAGNOSIS — O99.519 ASTHMA DURING PREGNANCY: ICD-10-CM

## 2023-03-15 NOTE — PROGRESS NOTES
Pt is a 28 y o  [id number] 37w2d  Pregnancy is complicated by h o asthma, anemia and h o PTD    Seen in L&D last week for suspected labor-- true labor was ruled out and therefore sent home  She continues to have frequent and uncomfortable jeannie edge, but not regular  Baby is active, denies leakage of fluid, vaginal bleeding or uterine contractions  S=D, normal FHTs, normal BP  Cervix: 2 5-3 cm/ 70%/ 0    GBS negative  Plan is for IOL if no spontaneous labor by due date  RV one week

## 2023-03-20 ENCOUNTER — HOSPITAL ENCOUNTER (OUTPATIENT)
Facility: HOSPITAL | Age: 28
Discharge: HOME | End: 2023-03-20
Attending: OBSTETRICS & GYNECOLOGY | Admitting: OBSTETRICS & GYNECOLOGY

## 2023-03-20 ENCOUNTER — TELEPHONE (OUTPATIENT)
Dept: OBGYN CLINIC | Facility: CLINIC | Age: 28
End: 2023-03-20

## 2023-03-20 VITALS
SYSTOLIC BLOOD PRESSURE: 115 MMHG | DIASTOLIC BLOOD PRESSURE: 59 MMHG | TEMPERATURE: 98 F | RESPIRATION RATE: 16 BRPM | HEART RATE: 71 BPM

## 2023-03-20 DIAGNOSIS — R11.2 NAUSEA AND VOMITING, UNSPECIFIED VOMITING TYPE: Primary | ICD-10-CM

## 2023-03-20 PROBLEM — Z3A.38 38 WEEKS GESTATION OF PREGNANCY: Status: ACTIVE | Noted: 2022-12-20

## 2023-03-20 RX ORDER — PANTOPRAZOLE SODIUM 20 MG/1
40 TABLET, DELAYED RELEASE ORAL
Status: DISCONTINUED | OUTPATIENT
Start: 2023-03-20 | End: 2023-03-20 | Stop reason: HOSPADM

## 2023-03-20 RX ORDER — ONDANSETRON 4 MG/1
4 TABLET, FILM COATED ORAL EVERY 8 HOURS PRN
Qty: 20 TABLET | Refills: 0 | Status: SHIPPED | OUTPATIENT
Start: 2023-03-20 | End: 2023-03-25

## 2023-03-20 RX ORDER — ONDANSETRON 2 MG/ML
4 INJECTION INTRAMUSCULAR; INTRAVENOUS ONCE
Status: COMPLETED | OUTPATIENT
Start: 2023-03-20 | End: 2023-03-20

## 2023-03-20 RX ORDER — PANTOPRAZOLE SODIUM 20 MG/1
20 TABLET, DELAYED RELEASE ORAL DAILY
Qty: 30 TABLET | Refills: 1 | Status: SHIPPED | OUTPATIENT
Start: 2023-03-20 | End: 2023-03-25

## 2023-03-20 RX ADMIN — SODIUM CHLORIDE, SODIUM LACTATE, POTASSIUM CHLORIDE, AND CALCIUM CHLORIDE 1000 ML: .6; .31; .03; .02 INJECTION, SOLUTION INTRAVENOUS at 10:28

## 2023-03-20 RX ADMIN — PANTOPRAZOLE SODIUM 40 MG: 20 TABLET, DELAYED RELEASE ORAL at 11:36

## 2023-03-20 RX ADMIN — ONDANSETRON HYDROCHLORIDE 4 MG: 2 SOLUTION INTRAMUSCULAR; INTRAVENOUS at 10:23

## 2023-03-20 NOTE — TELEPHONE ENCOUNTER
29 yo  called w/ report of N/V/D and decreased FM  Called patient  She reports that since Saturday night after having some cereal, she developed cramping and nausea; she had vomiting with diarrhea all night  Same all day yesterday, tried eating toast, apple sauce and rice, water but continued with vomiting and diarrhea  No fever or chills, no abdominal pain or bleeding  Feels cramping when needs to pass bowels  Decreased FM-- he is usually active at night but she hasn't felt movement since last night  Advised to go to L&D right away  She agrees  SOD notified

## 2023-03-20 NOTE — PROGRESS NOTES
Triage Note - OB  Dorothea [place] 28 y o  female MRN: [id number]  Unit/Bed#: L&D 321-01 Encounter: [id number]    OB TRIAGE NOTE  Dorothea [place]  [id number]  3/20/2023  11:33 AM  L&D 321/L&D 321-01    ASSESS:  28 y o  [id number] 38w0d with nausea, vomiting, diarrhea x 2 days  PLAN  #1  Nausea/vomiting  · Improved S/p 1L IVF, Zofran and Protonix  · Passed PO challenge  · Zofran and Protonix sent to pharmacy    #2  38 week pregnancy  · FHT reactive, no contractions  · SVE 3/70/-2, unchanged from previous check    #3  Discharge instructions  · Patient instructed to call if experiencing worsening contractions, vaginal bleeding, loss of fluid or decreased fetal movement  · Will follow up with OBGYN on 3/22/23  D/w Dr Agnes Opitz  ______________    SUBJECTIVE    JILLIAN: Estimated Date of Delivery: 4/3/23    HPI Chronology:  28 y o  [id number] 38w0d presents with complaint of nausea, vomiting, diarrhea since Saturday  Patient reports she has been unable to keep anything down including solids and liquids  She hasn't tried anything at home to help with her symptoms  Nothing in particular makes her symptoms better or worse  She denied changes to her diet or any sick contacts  She denied any vaginal discharge, bleeding, contractions, changes to baby's movement  She denied any fever, chills, shortness of breath, chest pain, abdominal pain, back pain, vaginal pruritis, discharge, dysuria, urinary frequency or urgency  Vitals:   /59 (BP Location: Left arm)   Pulse 71   Temp 98 °F (36 7 °C) (Oral)   Resp 16   LMP 07/03/2022   There is no height or weight on file to calculate BMI  Review of Systems   Constitutional: Negative for chills and fever  Respiratory: Negative for shortness of breath  Cardiovascular: Negative for chest pain and palpitations  Gastrointestinal: Positive for diarrhea, nausea and vomiting  Negative for abdominal pain and blood in stool     Endocrine: Negative for polyuria  Genitourinary: Negative for dysuria, flank pain, frequency, pelvic pain, urgency, vaginal bleeding, vaginal discharge and vaginal pain  Neurological: Negative for dizziness  Physical Exam  HENT:      Head: Normocephalic  Mouth/Throat:      Pharynx: Oropharynx is clear  Eyes:      Conjunctiva/sclera: Conjunctivae normal    Cardiovascular:      Rate and Rhythm: Normal rate  Pulses: Normal pulses  Pulmonary:      Effort: Pulmonary effort is normal    Abdominal:      Palpations: Abdomen is soft  Tenderness: There is no abdominal tenderness  Genitourinary:     General: Normal vulva  Skin:     General: Skin is warm  Neurological:      Mental Status: She is alert and oriented to person, place, and time  Psychiatric:         Mood and Affect: Mood normal          FHT:  Baseline Rate: 125 bpm  Variability: Moderate 6-25 bpm  Accelerations: 15 x 15 or greater  Decelerations: None  TOCO:   Contraction Frequency (minutes): none  Contraction Duration (seconds): n/a  Contraction Quality: Not applicable    Labs: No results found for this or any previous visit (from the past 24 hour(s))  Lab, Imaging and other studies: I have personally reviewed pertinent reports        Beryle Partridge, PA-S Wilfredo Nab, MD  3/20/2023  11:33 AM

## 2023-03-20 NOTE — TELEPHONE ENCOUNTER
Pt states she is unable to keep any food or drink down  She states she can't drink water either  She states she is unable to feel movement of the baby  Pt is 38 wks  Please Advise

## 2023-03-22 ENCOUNTER — ROUTINE PRENATAL (OUTPATIENT)
Dept: OBGYN CLINIC | Facility: CLINIC | Age: 28
End: 2023-03-22

## 2023-03-22 VITALS
BODY MASS INDEX: 31.84 KG/M2 | DIASTOLIC BLOOD PRESSURE: 62 MMHG | HEART RATE: 70 BPM | WEIGHT: 162.2 LBS | SYSTOLIC BLOOD PRESSURE: 100 MMHG | HEIGHT: 60 IN

## 2023-03-22 DIAGNOSIS — Z3A.38 38 WEEKS GESTATION OF PREGNANCY: ICD-10-CM

## 2023-03-22 DIAGNOSIS — J45.909 ASTHMA DURING PREGNANCY: Primary | ICD-10-CM

## 2023-03-22 DIAGNOSIS — O99.519 ASTHMA DURING PREGNANCY: Primary | ICD-10-CM

## 2023-03-22 DIAGNOSIS — O99.013 ANEMIA AFFECTING PREGNANCY IN THIRD TRIMESTER: ICD-10-CM

## 2023-03-22 DIAGNOSIS — O09.893 HISTORY OF PRETERM DELIVERY, CURRENTLY PREGNANT IN THIRD TRIMESTER: ICD-10-CM

## 2023-03-22 NOTE — PROGRESS NOTES
Pt is a 28 y o  [id number] 38w2d  Pregnancy is complicated by asthma, anemia, h o PTD  Pt reports +FM  Denies vb, lof  She has irregular ctx  Labor precautions and fkc reviewed  SVE: 3/70/0  Desires IOL if goes past EDC  IOL consent reviewed and signed  F/u 1 week

## 2023-03-23 ENCOUNTER — HOSPITAL ENCOUNTER (INPATIENT)
Facility: HOSPITAL | Age: 28
LOS: 2 days | Discharge: HOME | End: 2023-03-25
Attending: OBSTETRICS & GYNECOLOGY | Admitting: OBSTETRICS & GYNECOLOGY

## 2023-03-23 ENCOUNTER — ANESTHESIA EVENT (INPATIENT)
Dept: ANESTHESIOLOGY | Facility: HOSPITAL | Age: 28
End: 2023-03-23

## 2023-03-23 ENCOUNTER — NURSE TRIAGE (OUTPATIENT)
Dept: OTHER | Facility: OTHER | Age: 28
End: 2023-03-23

## 2023-03-23 ENCOUNTER — ANESTHESIA (INPATIENT)
Dept: ANESTHESIOLOGY | Facility: HOSPITAL | Age: 28
End: 2023-03-23

## 2023-03-23 DIAGNOSIS — Z3A.38 38 WEEKS GESTATION OF PREGNANCY: Primary | ICD-10-CM

## 2023-03-23 PROBLEM — O99.013 ANEMIA, ANTEPARTUM, THIRD TRIMESTER: Status: RESOLVED | Noted: 2023-01-17 | Resolved: 2023-03-23

## 2023-03-23 LAB
ABO GROUP BLD: NORMAL
BLD GP AB SCN SERPL QL: NEGATIVE
ERYTHROCYTE [DISTWIDTH] IN BLOOD BY AUTOMATED COUNT: 14.5 % (ref 11.6–15.1)
HCT VFR BLD AUTO: 31.9 % (ref 34.8–46.1)
HGB BLD-MCNC: 10.1 G/DL (ref 11.5–15.4)
HOLD SPECIMEN: YES
MCH RBC QN AUTO: 24.8 PG (ref 26.8–34.3)
MCHC RBC AUTO-ENTMCNC: 31.7 G/DL (ref 31.4–37.4)
MCV RBC AUTO: 78 FL (ref 82–98)
PLATELET # BLD AUTO: 326 THOUSANDS/UL (ref 149–390)
PMV BLD AUTO: 9.4 FL (ref 8.9–12.7)
RBC # BLD AUTO: 4.07 MILLION/UL (ref 3.81–5.12)
RH BLD: POSITIVE
SPECIMEN EXPIRATION DATE: NORMAL
WBC # BLD AUTO: 17.45 THOUSAND/UL (ref 4.31–10.16)

## 2023-03-23 RX ORDER — SODIUM CHLORIDE, SODIUM LACTATE, POTASSIUM CHLORIDE, CALCIUM CHLORIDE 600; 310; 30; 20 MG/100ML; MG/100ML; MG/100ML; MG/100ML
125 INJECTION, SOLUTION INTRAVENOUS CONTINUOUS
Status: DISCONTINUED | OUTPATIENT
Start: 2023-03-23 | End: 2023-03-24

## 2023-03-23 RX ORDER — ALBUTEROL SULFATE 90 UG/1
2 AEROSOL, METERED RESPIRATORY (INHALATION) EVERY 6 HOURS PRN
Status: DISCONTINUED | OUTPATIENT
Start: 2023-03-23 | End: 2023-03-25 | Stop reason: HOSPADM

## 2023-03-23 RX ORDER — BUPIVACAINE HYDROCHLORIDE 2.5 MG/ML
30 INJECTION, SOLUTION EPIDURAL; INFILTRATION; INTRACAUDAL ONCE AS NEEDED
Status: DISCONTINUED | OUTPATIENT
Start: 2023-03-23 | End: 2023-03-24

## 2023-03-23 RX ORDER — ONDANSETRON 2 MG/ML
4 INJECTION INTRAMUSCULAR; INTRAVENOUS EVERY 6 HOURS PRN
Status: DISCONTINUED | OUTPATIENT
Start: 2023-03-23 | End: 2023-03-24

## 2023-03-23 RX ORDER — ROPIVACAINE HYDROCHLORIDE 2 MG/ML
INJECTION, SOLUTION EPIDURAL; INFILTRATION; PERINEURAL AS NEEDED
Status: DISCONTINUED | OUTPATIENT
Start: 2023-03-23 | End: 2023-03-24 | Stop reason: HOSPADM

## 2023-03-23 RX ORDER — CALCIUM CARBONATE 200(500)MG
1000 TABLET,CHEWABLE ORAL 3 TIMES DAILY PRN
Status: DISCONTINUED | OUTPATIENT
Start: 2023-03-23 | End: 2023-03-24

## 2023-03-23 RX ORDER — ACETAMINOPHEN 325 MG/1
975 TABLET ORAL EVERY 8 HOURS PRN
Status: DISCONTINUED | OUTPATIENT
Start: 2023-03-23 | End: 2023-03-24

## 2023-03-23 RX ADMIN — ROPIVACAINE HYDROCHLORIDE 10 ML/HR: 2 INJECTION, SOLUTION EPIDURAL; INFILTRATION at 23:57

## 2023-03-23 RX ADMIN — ROPIVACAINE HYDROCHLORIDE 5 ML: 2 INJECTION, SOLUTION EPIDURAL; INFILTRATION at 23:56

## 2023-03-23 RX ADMIN — SODIUM CHLORIDE, SODIUM LACTATE, POTASSIUM CHLORIDE, AND CALCIUM CHLORIDE 999 ML/HR: .6; .31; .03; .02 INJECTION, SOLUTION INTRAVENOUS at 23:33

## 2023-03-23 RX ADMIN — SODIUM CHLORIDE, SODIUM LACTATE, POTASSIUM CHLORIDE, AND CALCIUM CHLORIDE 999 ML/HR: .6; .31; .03; .02 INJECTION, SOLUTION INTRAVENOUS at 22:35

## 2023-03-23 RX ADMIN — ROPIVACAINE HYDROCHLORIDE 5 ML: 2 INJECTION, SOLUTION EPIDURAL; INFILTRATION at 23:51

## 2023-03-23 NOTE — TELEPHONE ENCOUNTER
Reason for Disposition  • Baby moving less today (e g , kick count < 5 in 1 hour or < 10 in 2 hours)    Answer Assessment - Initial Assessment Questions  1  ONSET: "When did the symptoms begin?"         Started at 1600    2  CONTRACTIONS: "Describe the contractions that you are having " (e g , duration, frequency, regularity, severity)      Every 15 minutes unsure how long they are lasting    3  JILLIAN: "What date are you expecting to deliver?"     04/03/2023    4  PARITY: "Have you had a baby before?" If Yes, ask: "How long did the labor last?"      Yes    5  FETAL MOVEMENT: "Has the baby's movement decreased or changed significantly from normal?"      Decreased movement    6   OTHER SYMPTOMS: "Do you have any other symptoms?" (e g , leaking fluid from vagina, vaginal bleeding, fever, hand/facial swelling)     Mucous blood discharge, lost mucous plug earlier and tightening of the stomach    Protocols used: PREGNANCY - LABOR-ADULT-

## 2023-03-24 LAB
2HR DELTA HS TROPONIN: -1 NG/L
ATRIAL RATE: 70 BPM
ATRIAL RATE: 79 BPM
BASE EXCESS BLDCOA CALC-SCNC: -2.7 MMOL/L (ref 3–11)
BASE EXCESS BLDCOV CALC-SCNC: -3.8 MMOL/L (ref 1–9)
CARDIAC TROPONIN I PNL SERPL HS: 3 NG/L
CARDIAC TROPONIN I PNL SERPL HS: 4 NG/L
DME PARACHUTE DELIVERY DATE ACTUAL: NORMAL
DME PARACHUTE DELIVERY DATE REQUESTED: NORMAL
DME PARACHUTE ITEM DESCRIPTION: NORMAL
DME PARACHUTE ORDER STATUS: NORMAL
DME PARACHUTE SUPPLIER NAME: NORMAL
DME PARACHUTE SUPPLIER PHONE: NORMAL
HCO3 BLDCOA-SCNC: 24 MMOL/L (ref 17.3–27.3)
HCO3 BLDCOV-SCNC: 20.7 MMOL/L (ref 12.2–28.6)
O2 CT VFR BLDCOA CALC: 9.9 ML/DL
OXYHGB MFR BLDCOA: 42.8 %
OXYHGB MFR BLDCOV: 56.3 %
P AXIS: -2 DEGREES
P AXIS: -8 DEGREES
PCO2 BLDCOA: 48.3 MM[HG] (ref 30–60)
PCO2 BLDCOV: 36.3 MM HG (ref 27–43)
PH BLDCOA: 7.31 [PH] (ref 7.23–7.43)
PH BLDCOV: 7.37 [PH] (ref 7.19–7.49)
PO2 BLDCOA: 18.8 MM HG (ref 5–25)
PO2 BLDCOV: 23.3 MM HG (ref 15–45)
PR INTERVAL: 162 MS
PR INTERVAL: 168 MS
QRS AXIS: 104 DEGREES
QRS AXIS: 106 DEGREES
QRSD INTERVAL: 102 MS
QRSD INTERVAL: 106 MS
QT INTERVAL: 398 MS
QT INTERVAL: 400 MS
QTC INTERVAL: 432 MS
QTC INTERVAL: 456 MS
SAO2 % BLDCOV: 13.1 ML/DL
T WAVE AXIS: 54 DEGREES
T WAVE AXIS: 54 DEGREES
TREPONEMA PALLIDUM IGG+IGM AB [PRESENCE] IN SERUM OR PLASMA BY IMMUNOASSAY: NORMAL
VENTRICULAR RATE: 70 BPM
VENTRICULAR RATE: 79 BPM

## 2023-03-24 PROCEDURE — 4A1HXCZ MONITORING OF PRODUCTS OF CONCEPTION, CARDIAC RATE, EXTERNAL APPROACH: ICD-10-PCS | Performed by: OBSTETRICS & GYNECOLOGY

## 2023-03-24 PROCEDURE — 10907ZC DRAINAGE OF AMNIOTIC FLUID, THERAPEUTIC FROM PRODUCTS OF CONCEPTION, VIA NATURAL OR ARTIFICIAL OPENING: ICD-10-PCS | Performed by: OBSTETRICS & GYNECOLOGY

## 2023-03-24 RX ORDER — TRISODIUM CITRATE DIHYDRATE AND CITRIC ACID MONOHYDRATE 500; 334 MG/5ML; MG/5ML
30 SOLUTION ORAL ONCE
Status: COMPLETED | OUTPATIENT
Start: 2023-03-24 | End: 2023-03-24

## 2023-03-24 RX ORDER — ONDANSETRON 2 MG/ML
4 INJECTION INTRAMUSCULAR; INTRAVENOUS EVERY 8 HOURS PRN
Status: DISCONTINUED | OUTPATIENT
Start: 2023-03-24 | End: 2023-03-25 | Stop reason: HOSPADM

## 2023-03-24 RX ORDER — CALCIUM CARBONATE 200(500)MG
1000 TABLET,CHEWABLE ORAL DAILY PRN
Status: DISCONTINUED | OUTPATIENT
Start: 2023-03-24 | End: 2023-03-25 | Stop reason: HOSPADM

## 2023-03-24 RX ORDER — DIPHENHYDRAMINE HCL 25 MG
25 TABLET ORAL EVERY 6 HOURS PRN
Status: DISCONTINUED | OUTPATIENT
Start: 2023-03-24 | End: 2023-03-25 | Stop reason: HOSPADM

## 2023-03-24 RX ORDER — IBUPROFEN 600 MG/1
600 TABLET ORAL EVERY 6 HOURS
Status: DISCONTINUED | OUTPATIENT
Start: 2023-03-24 | End: 2023-03-25 | Stop reason: HOSPADM

## 2023-03-24 RX ORDER — OXYTOCIN/RINGER'S LACTATE 30/500 ML
PLASTIC BAG, INJECTION (ML) INTRAVENOUS
Status: COMPLETED
Start: 2023-03-24 | End: 2023-03-24

## 2023-03-24 RX ORDER — ACETAMINOPHEN 325 MG/1
650 TABLET ORAL EVERY 6 HOURS
Status: DISCONTINUED | OUTPATIENT
Start: 2023-03-24 | End: 2023-03-25 | Stop reason: HOSPADM

## 2023-03-24 RX ORDER — DIAPER,BRIEF,INFANT-TODD,DISP
1 EACH MISCELLANEOUS DAILY PRN
Status: DISCONTINUED | OUTPATIENT
Start: 2023-03-24 | End: 2023-03-25 | Stop reason: HOSPADM

## 2023-03-24 RX ORDER — OXYTOCIN/RINGER'S LACTATE 30/500 ML
250 PLASTIC BAG, INJECTION (ML) INTRAVENOUS CONTINUOUS
Status: ACTIVE | OUTPATIENT
Start: 2023-03-24 | End: 2023-03-24

## 2023-03-24 RX ORDER — FAMOTIDINE 10 MG/ML
20 INJECTION, SOLUTION INTRAVENOUS ONCE
Status: COMPLETED | OUTPATIENT
Start: 2023-03-24 | End: 2023-03-24

## 2023-03-24 RX ORDER — ROPIVACAINE HYDROCHLORIDE 2 MG/ML
INJECTION, SOLUTION EPIDURAL; INFILTRATION; PERINEURAL CONTINUOUS PRN
Status: DISCONTINUED | OUTPATIENT
Start: 2023-03-23 | End: 2023-03-24 | Stop reason: HOSPADM

## 2023-03-24 RX ORDER — DOCUSATE SODIUM 100 MG/1
100 CAPSULE, LIQUID FILLED ORAL 2 TIMES DAILY
Status: DISCONTINUED | OUTPATIENT
Start: 2023-03-24 | End: 2023-03-25 | Stop reason: HOSPADM

## 2023-03-24 RX ORDER — PANTOPRAZOLE SODIUM 40 MG/10ML
40 INJECTION, POWDER, LYOPHILIZED, FOR SOLUTION INTRAVENOUS ONCE
Status: COMPLETED | OUTPATIENT
Start: 2023-03-24 | End: 2023-03-24

## 2023-03-24 RX ADMIN — ONDANSETRON 4 MG: 2 INJECTION INTRAMUSCULAR; INTRAVENOUS at 02:55

## 2023-03-24 RX ADMIN — SODIUM CITRATE AND CITRIC ACID MONOHYDRATE 30 ML: 500; 334 SOLUTION ORAL at 01:54

## 2023-03-24 RX ADMIN — IBUPROFEN 600 MG: 600 TABLET, FILM COATED ORAL at 18:25

## 2023-03-24 RX ADMIN — ACETAMINOPHEN 325MG 650 MG: 325 TABLET ORAL at 05:55

## 2023-03-24 RX ADMIN — BENZOCAINE AND LEVOMENTHOL 1 APPLICATION.: 200; 5 SPRAY TOPICAL at 08:40

## 2023-03-24 RX ADMIN — WITCH HAZEL 1 PAD.: 500 SOLUTION RECTAL; TOPICAL at 08:40

## 2023-03-24 RX ADMIN — SODIUM CHLORIDE, SODIUM LACTATE, POTASSIUM CHLORIDE, AND CALCIUM CHLORIDE 125 ML/HR: .6; .31; .03; .02 INJECTION, SOLUTION INTRAVENOUS at 00:34

## 2023-03-24 RX ADMIN — ACETAMINOPHEN 325MG 650 MG: 325 TABLET ORAL at 18:59

## 2023-03-24 RX ADMIN — ROPIVACAINE HYDROCHLORIDE: 2 INJECTION, SOLUTION EPIDURAL; INFILTRATION at 00:13

## 2023-03-24 RX ADMIN — DOCUSATE SODIUM 100 MG: 100 CAPSULE, LIQUID FILLED ORAL at 18:25

## 2023-03-24 RX ADMIN — FAMOTIDINE 20 MG: 10 INJECTION INTRAVENOUS at 01:54

## 2023-03-24 RX ADMIN — ACETAMINOPHEN 325MG 650 MG: 325 TABLET ORAL at 13:00

## 2023-03-24 RX ADMIN — IBUPROFEN 600 MG: 600 TABLET, FILM COATED ORAL at 11:05

## 2023-03-24 RX ADMIN — Medication: at 05:16

## 2023-03-24 RX ADMIN — IBUPROFEN 600 MG: 600 TABLET, FILM COATED ORAL at 05:55

## 2023-03-24 RX ADMIN — PANTOPRAZOLE SODIUM 40 MG: 40 INJECTION, POWDER, FOR SOLUTION INTRAVENOUS at 01:58

## 2023-03-24 NOTE — OB LABOR/OXYTOCIN SAFETY PROGRESS
Labor Progress Note - Shilo Alfonso 28 y o  female MRN: [id number]    Unit/Bed#: L&D 322-01 Encounter: [id number]       Contraction Frequency (minutes): 2-3  Contraction Quality: Moderate  Tachysystole: No   Cervical Dilation: 8        Cervical Effacement: 100  Fetal Station: 0  Baseline Rate: 140 bpm  Fetal Heart Rate: 140 BPM  FHR Category: Category II               Vital Signs:   Vitals:    03/24/23 0331   BP: 104/59   Pulse: 85   Resp:    Temp:    SpO2:        Notes/comments:   Patient feeling pressure  SVE 8/100/0  FHT category 2 with intermittent variable decelerations with moderate variability and accelerations  Contractions every 2-3 min  Continue expectant management  Dr Richelle Zheng aware          Adeola Collier MD 3/24/2023 3:54 AM

## 2023-03-24 NOTE — H&P
Pt is a 28 y o  [id number] with Patient's last menstrual period was 07/03/2022 , Estimated Date of Delivery: 4/3/23, 38w3d by   who presents c/o contractions  Her pregnancy has been notable for asthma, anemia     Pt was 3 cm in the office yesterday  Today on presentation she was 4 cm  On recheck she is noted to be 5 cm  PMHx:   Past Medical History:   Diagnosis Date   • Abnormal Pap smear of cervix     1/2020 pap negative;   • Anemia    • Asthma     uses inhaler   • Depression     in the past   • Genetic screening     9/2022-Hgb AA   • Gonorrhea 2012   • Migraine     tylenol   • Miscarriage     x 1   • Urinary tract infection    • Varicella     patient had chicken pox       PSHx:   Past Surgical History:   Procedure Laterality Date   • TONSILLECTOMY         Meds:   Medications Prior to Admission   Medication   • Ferrous Sulfate Dried ER (Slow Release Iron) 160 (50 Fe) MG TBCR   • ondansetron (ZOFRAN) 4 mg tablet   • pantoprazole (PROTONIX) 20 mg tablet   • Prenatal Vit w/Iv-Ygaosptdb-VQ (PNV PO)   • albuterol (PROVENTIL HFA,VENTOLIN HFA) 90 mcg/act inhaler       Allergies: Allergies   Allergen Reactions   • Fruit Extracts      Category: Allergy;  Annotation - [id number]: pomegranite lotion has skin reaction, pomegranite fruit has itching and swelling       Social Hx:   Social History     Socioeconomic History   • Marital status: Single     Spouse name: Not on file   • Number of children: 1   • Years of education: Not on file   • Highest education level: Not on file   Occupational History   • Not on file   Tobacco Use   • Smoking status: Never   • Smokeless tobacco: Never   Vaping Use   • Vaping Use: Never used   Substance and Sexual Activity   • Alcohol use: Not Currently   • Drug use: No   • Sexual activity: Yes     Partners: Male     Birth control/protection: None   Other Topics Concern   • Not on file   Social History Narrative    Always uses seat belt    Caffeine use    No Druze beliefs     Social Determinants of Health     Financial Resource Strain: Not on file   Food Insecurity: Not on file   Transportation Needs: Not on file   Physical Activity: Not on file   Stress: Not on file   Social Connections: Not on file   Intimate Partner Violence: Not on file   Housing Stability: Not on file       Ob Hx:   OB History    Para Term  AB Living   8 3 3   4 3   SAB IAB Ectopic Multiple Live Births   2 2   0 3      # Outcome Date GA Lbr Ronald/2nd Weight Sex Delivery Anes PTL Lv   8 Current            7 Term 20 38w6d  2810 g (6 lb 3 1 oz) M Vag-Spont EPI N MARGO      Complications: Placenta previa   6 Term 17 38w2d / 00:36 3260 g (7 lb 3 oz) M Vag-Spont EPI N MARGO   5 Term 13 40w0d   M    MARGO   4 SAB            3 IAB            2 IAB            1 SAB               Obstetric Comments   Menarche 11   Cycles Q 28d       Fm Hx:    Family History   Problem Relation Age of Onset   • Migraines Mother    • Asthma Mother    • Transient ischemic attack Father    • Asthma Father    • No Known Problems Son    • No Known Problems Son    • Cancer Maternal Grandmother         lung   • Cancer Paternal Grandmother         ? type   • Breast cancer Family    • No Known Problems Half-Sister    • No Known Problems Half-Sister    • No Known Problems Half-Sister    • Asthma Half-Brother    • No Known Problems Half-Brother    • Colon cancer Neg Hx    • Ovarian cancer Neg Hx    • Deep vein thrombosis Neg Hx    • Pulmonary embolism Neg Hx    • Venous thrombosis Neg Hx    • Diabetes Neg Hx    • Heart attack Neg Hx    • Heart disease Neg Hx    • Hypertension Neg Hx    • Miscarriages / Stillbirths Neg Hx    • Seizures Neg Hx    • Thyroid disease Neg Hx        ROS:  Review of Systems   Constitutional: Negative for chills, fatigue, fever and unexpected weight change  HENT: Negative for congestion, mouth sores and sore throat  Respiratory: Negative for cough, chest tightness, shortness of breath and wheezing  Cardiovascular: Negative for chest pain and palpitations  Gastrointestinal: Negative for abdominal distention, abdominal pain, constipation, diarrhea, nausea and vomiting  Endocrine: Negative for cold intolerance and heat intolerance  Genitourinary: Positive for pelvic pain (contractions)  Negative for dyspareunia, dysuria, genital sores, menstrual problem, vaginal bleeding, vaginal discharge and vaginal pain  Musculoskeletal: Negative for arthralgias  Skin: Negative for color change and rash  Neurological: Negative for dizziness, light-headedness and headaches  Hematological: Negative for adenopathy  VS:    Vitals:    03/23/23 1922   BP: 114/67   Pulse: 73   Resp: 18   Temp: 98 1 °F (36 7 °C)       FHR:  Baseline Rate: 125 bpm  Variability: Moderate 6-25 bpm  Accelerations: Periodic  Decelerations: Early  FHR Category: Category I  TOCO:   Contraction Frequency (minutes): 5-7  Contraction Duration (seconds): 70-80  Contraction Quality: Moderate      Exam:  Physical Exam  Constitutional:       General: She is not in acute distress  Appearance: Normal appearance  She is normal weight  She is not ill-appearing  HENT:      Head: Normocephalic and atraumatic  Eyes:      Extraocular Movements: Extraocular movements intact  Conjunctiva/sclera: Conjunctivae normal    Cardiovascular:      Rate and Rhythm: Normal rate and regular rhythm  Heart sounds: Normal heart sounds  Pulmonary:      Effort: Pulmonary effort is normal       Breath sounds: Normal breath sounds  Abdominal:      General: Bowel sounds are normal  There is no distension  Palpations: Abdomen is soft  There is mass (gravid uterus)  Tenderness: There is no abdominal tenderness  There is no guarding or rebound  Hernia: No hernia is present  Musculoskeletal:         General: Normal range of motion  Cervical back: Normal range of motion  Skin:     General: Skin is warm        Findings: No erythema or rash    Neurological:      Mental Status: She is alert and oriented to person, place, and time  Psychiatric:         Mood and Affect: Mood normal          Behavior: Behavior normal          Thought Content: Thought content normal          Judgment: Judgment normal                   vulva   normal external genitalia for age and no lesions, masses, epithelial changes, or exudate    vagina color pink, rugae  well formed rugae and blood tinged mucus noted at introitus    cvx      90%  effaced, 5  cm dilated and -1  station    uterus  gravid, term    adn      deferred    RV       deferred    A/P: 28 y o  [id number] with Patient's last menstrual period was 07/03/2022 , Estimated Date of Delivery: 4/3/23, 38w3d with active labor  1  Admit to L&D  2  CBC, RPR, T&S  3  GBS is negative, no prophylaxis needed  4   Epidural prn    Zhang Anthony MD

## 2023-03-24 NOTE — ANESTHESIA POSTPROCEDURE EVALUATION
Post-Op Assessment Note    CV Status:  Stable    Pain management: adequate     Mental Status:  Alert and awake   Hydration Status:  Euvolemic   PONV Controlled:  Controlled   Airway Patency:  Patent      Post Op Vitals Reviewed: Yes      Staff: Anesthesiologist     Post-op block assessment: catheter intact and no complications      No notable events documented      BP      Temp      Pulse     Resp      SpO2      /55   Pulse 70   Temp 99 5 °F (37 5 °C) (Oral)   Resp 18   Ht 5' (1 524 m)   Wt 74 4 kg (164 lb)   LMP 07/03/2022   SpO2 100%   Breastfeeding Yes   BMI 32 03 kg/m²

## 2023-03-24 NOTE — CASE MANAGEMENT
Case Management Progress Note    Patient name Braydon Bhardwaj  Location L&D 322/L&D 923-67 MRN [id number]  : 1995 Date 3/24/2023       LOS (days): 1  Geometric Mean LOS (GMLOS) (days):   Days to GMLOS:        OBJECTIVE:        Current admission status: Inpatient  Preferred Pharmacy:   [id number], [address] [place] [id number]  Phone: [phone] Fax: [phone]    Primary Care Provider: No primary care provider on file  Primary Insurance: Cat ZEE  Secondary Insurance:     PROGRESS NOTE:    CM received consult for breast pump  MOB requesting Spectra S2 pump  CM placed order via [employer]  Order approved  CM delivered pump to MOB's room  Delivery ticket signed and placed in bin for DME liaison

## 2023-03-24 NOTE — OB LABOR/OXYTOCIN SAFETY PROGRESS
Labor Progress Note - Dudley Reichu 28 y o  female MRN: [id number]    Unit/Bed#: L&D 322-01 Encounter: [id number]       Contraction Frequency (minutes): 3-4  Contraction Quality: Moderate  Tachysystole: No   Cervical Dilation: 6        Cervical Effacement: 90  Fetal Station: -1  Baseline Rate: 130 bpm  Fetal Heart Rate: 130 BPM  FHR Category: Category I               Vital Signs:   Vitals:    03/24/23 0231   BP: 106/59   Pulse: 90   Resp:    Temp:    SpO2:        Notes/comments:   Patient resting comfortably  Chest tightness improved w/ meds and repositioning  FHT category 1 with contractions every 3-4 min  SVE 6/90/-1  Continue expectant management  Dr Ardon Scale aware          Geovanny Arevalo MD 3/24/2023 2:52 AM

## 2023-03-24 NOTE — LACTATION NOTE
This note was copied from a baby's chart  Met with mother  Provided mother with Ready, Set, Baby booklet which contained information on:  Hand expression with access to QR codes to review hand expression  Positioning and latch reviewed as well as showing images of other feeding positions  Discussed the properties of a good latch in any position  Feeding on cue and what that means for recognizing infant's hunger, s/s that baby is getting enough milk and some s/s that breastfeeding dyad may need further help  Skin to Skin contact an benefits to mom and baby  Avoidance of pacifiers for the first month discussed  Gave information on common concerns, what to expect the first few weeks after delivery, preparing for other caregivers, and how partners can help  Resources for support also provided  Reviewed discharge breastfeeding booklet including the feeding log  Emphasized 8 or more (12) feedings in a 24 hour period, what to expect for the number of diapers per day of life and the progression of properties of the  stooling pattern  Reviewed breastfeeding and your lifestyle, storage and preparation of breast milk, how to keep you breast pump clean, the employed breastfeeding mother and paced bottle feeding handouts  Booklet included Breastfeeding Resources for after discharge including access to the number for the [address]  Mom states baby had been latched for 10 minutes just prior to visit  Mom expressed comfort with latch and position  Baby appears to have signs of deep latch  Baby currently soothing self by sucking thumb  Encouraged mom to place baby back to breast as this is a feeding cue  Encoraged MOB  to call for assistance, questions and concerns  Extension number for inpatient lactation support provided

## 2023-03-24 NOTE — PROGRESS NOTES
Patient complaining of chest tightness  She denies chest pain, SOB, or palpitations  Vitals normal as noted below  Physical exam normal as noted below  Vitals:    03/24/23 0114   BP:    Pulse: 71   Resp:    Temp:    SpO2: 100%     Physical Exam  Constitutional:       General: She is not in acute distress  Appearance: Normal appearance  She is not ill-appearing  HENT:      Mouth/Throat:      Mouth: Mucous membranes are moist    Eyes:      Extraocular Movements: Extraocular movements intact  Cardiovascular:      Rate and Rhythm: Normal rate and regular rhythm  Heart sounds: Normal heart sounds  Pulmonary:      Effort: Pulmonary effort is normal       Breath sounds: Normal breath sounds  Musculoskeletal:         General: No swelling or tenderness  Right lower leg: No edema  Left lower leg: No edema  Neurological:      General: No focal deficit present  Mental Status: She is alert  Skin:     General: Skin is warm and dry  Coloration: Skin is not jaundiced or pale  Psychiatric:         Mood and Affect: Mood normal          Behavior: Behavior normal          Thought Content: Thought content normal          Judgment: Judgment normal        Imaging:  EKG with partial right bundle branch block and right axis deviation  Labs:  Troponin pending  Consult:  Curbsided cardiology to discuss EKG  They state EKG findings are not representative of an ischemic event and that ischemia is low on their differential   They also mentioned the possibility of PE though noted with normal heart rate and no SOB, low concern for this at this time  Highest concern for GI etiology e g  reflux    Recommended plan as follows:    Assessment/Plan:  · Follow up troponin  · Give albuterol dose given history of asthma  · Give Bicitra, Protonix, Pepcid  · No cardiology follow up needed outpatient unless symptoms persist or troponin is abnormal  · Trend symptoms and vitals    Discussed w/ Dr Iyer Ply and Cardiology attending Dr Roscoe Mayo MD  OBGYN Resident  03/24/23  1:35 AM

## 2023-03-24 NOTE — L&D DELIVERY NOTE
Vaginal Delivery Summary - OB/GYN   Molly Avila 28 y o  female MRN: [id number]  Unit/Bed#: L&D 322-01 Encounter: [id number]      Pre-delivery Diagnosis:   1  Pregnancy at 38w4d   2  Asthma  3  Anemia    Post-delivery Diagnosis: same, delivered    Procedure: Spontaneous Vaginal Delivery    Attending: Dr Emperatriz Joya    Assistant(s): Dr Fitz Andersen    Anesthesia: Epidural    QBL: 24 mL    Complications: none apparent    Specimens:   1  Arterial and venous cord gases  2  Cord blood  3  Segment of umbilical cord  4  Placenta to storage     Findings:  1  Viable male on 3/24/2023 at 0513, with APGARS of 9 and 9 at 1 and 5 minutes respectively  2  Spontaneous delivery of intact placenta at 0518  3  Intact perineum  4  Blood gases:  Recent Results (from the past 1 hour(s))   CORD, Blood gas, venous    Collection Time: 03/24/23  5:14 AM   Result Value Ref Range    pH, Cord Cresencio 7 373 7 190 - 7 490    pCO2, Cord Cresencio 36 3 27 0 - 43 0 mm HG    pO2, Cord Cresencio 23 3 15 0 - 45 0 mm HG    HCO3, Cord Cresencio 20 7 12 2 - 28 6 mmol/L    Base Exc, Cord Cresencio -3 8 (L) 1 0 - 9 0 mmol/L    O2 Cont, Cord Cresencio 13 1 mL/dL    O2 HGB,VENOUS CORD 56 3 %   CORD, Blood gas, arterial    Collection Time: 03/24/23  5:14 AM   Result Value Ref Range    pH, Cord Art 7 314 7 230 - 7 430    pCO2, Cord Art 48 3 30 0 - 60 0    pO2, Cord Art 18 8 5 0 - 25 0 mm HG    HCO3, Cord Art 24 0 17 3 - 27 3 mmol/L    Base Exc, Cord Art -2 7 (L) 3 0 - 11 0 mmol/L    O2 Content, Cord Art 9 9 ml/dl    O2 Hgb, Arterial Cord 42 8 %     Disposition:  Patient tolerated the procedure well and was recovering in labor and delivery room     Brief history and labor course:  Ms Molly Avila is a 28 y o  [id number] at 38w4d  She presented to labor and delivery for contractions and was admitted for labor after making cervical change from 4/70/-2 to 5/90/-1  Her pregnancy was complicated by the above-mentioned problems    She received an epidural for maternal analgesia  Membranes spontaneously ruptured for clear fluid  During her labor course, she developed chest tightness  Troponin were normal x2, and EKG showed partial right bundle branch block and right axis deviation  Cardiology was curbsided and expressed low concern for cardiac ischemia, instead thinking her symptoms were more likely secondary to GI etiology  She was treated with albuterol due to history of asthma, and she was treated with GI cocktail, after which her symptoms improved  She then progressed to full dilation and began to push  Description of procedure:  After pushing for 2 minutes, at 0513 patient delivered a viable male , wt pending, apgars of 9 (1 min) and 9 (5 min)  The fetal vertex delivered spontaneously in the direct OA position and restituted to ARTHUR  There was no nuchal cord  The right anterior shoulder delivered atraumatically with maternal expulsive forces and the assistance of gentle downward traction  The left posterior shoulder delivered with maternal expulsive forces and the assistance of gentle upward traction  The remainder of the fetus delivered spontaneously  Upon delivery, the infant was placed on the mothers abdomen and the cord was clamped and cut after delayed cord clamping  The infant was noted to cry spontaneously and was moving all extremities appropriately  There was no evidence for injury  Awaiting nurse resuscitators evaluated the   Arterial and venous cord blood gases and cord blood was collected for analysis  These were promptly sent to the lab  In the immediate post-partum, 30 units of IV pitocin was administered, and the uterus was noted to contract down well with massage and pitocin  The placenta delivered spontaneously at 0517 and was noted to have an eccentrally inserted 3 vessel cord  The vagina, cervix, perineum, and rectum were inspected and noted to be intact  The fundus was firm and at the level of the umbilicus    At the conclusion of the procedure, all needle, sponge, and instrument counts were noted to be correct  Patient tolerated the procedure well and was allowed to recover in labor and delivery room with family and  before being transferred to the post-partum floor  Dr Jairo Arnold was present and participated in all key portions of the case        Clint Mayo MD  3/24/2023  5:25 AM

## 2023-03-24 NOTE — ANESTHESIA PROCEDURE NOTES
Epidural Block    Patient location during procedure: OB  Start time: 3/23/2023 11:49 PM  Reason for block: primary anesthetic  Staffing  Performed: Anesthesiologist   Anesthesiologist: Christ Leary DO  Preanesthetic Checklist  Completed: patient identified, IV checked, site marked, risks and benefits discussed, surgical consent, monitors and equipment checked, pre-op evaluation and timeout performed  Epidural  Patient position: sitting  Prep: Betadine  Patient monitoring: heart rate and frequent blood pressure checks  Approach: midline  Location: lumbar  Injection technique: KAT air  Needle  Needle type: Tuohy   Needle gauge: 17 G  Catheter type: side hole  Catheter size: 20 G  Catheter at skin depth: 9 cm  Catheter securement method: clear occlusive dressing  Test dose: negative  Assessment  Number of attempts: 2negative aspiration for CSF, negative aspiration for heme and no paresthesia on injection  patient tolerated the procedure well with no immediate complications

## 2023-03-24 NOTE — ANESTHESIA PREPROCEDURE EVALUATION
Procedure:  LABOR ANALGESIA    Relevant Problems   ANESTHESIA (within normal limits)      GYN   (+) 38 weeks gestation of pregnancy   (+) History of  delivery, currently pregnant in third trimester      HEMATOLOGY   (+) Anemia affecting pregnancy in third trimester      PULMONARY   (+) Asthma during pregnancy        Physical Exam    Airway    Mallampati score: II  TM Distance: >3 FB  Neck ROM: full     Dental   No notable dental hx     Cardiovascular  Rhythm: regular, Rate: normal, Cardiovascular exam normal    Pulmonary  Pulmonary exam normal Breath sounds clear to auscultation,     Other Findings        Anesthesia Plan  ASA Score- 2     Anesthesia Type- epidural with ASA Monitors  Additional Monitors:   Airway Plan:           Plan Factors-    Chart reviewed  Existing labs reviewed  Patient summary reviewed  Patient is not a current smoker  Induction-     Postoperative Plan-     Informed Consent- Anesthetic plan and risks discussed with patient

## 2023-03-24 NOTE — DISCHARGE SUMMARY
Discharge Summary - OB/GYN  Mervin Velazquez 28 y o  female MRN: [id number]  Unit/Bed#: L&D 322-01 Encounter: [id number]    Admission Date: 3/23/2023     Discharge Date: 3/25/23    Admitting Attending: Gilma Talley MD    Delivering Attending: Dr Rick Lozano    Discharging Attending: Dr Nayla Glaeana    Principal Diagnosis: Pregnancy at 38w4d    Secondary Diagnosis:   1  Asthma  2  Anemia    Procedures: spontaneous vaginal delivery    Anesthesia: epidural    Hospital course:  Ms Mervin Velazquez is a 28 y o  [id number] at 38w4d  She presented to labor and delivery for contractions and was admitted for labor after making cervical change from  to   Her pregnancy was complicated by the above-mentioned problems  She received an epidural for maternal analgesia  Membranes spontaneously ruptured for clear fluid  During her labor course, she developed chest tightness  Troponin were normal x2, and EKG showed partial right bundle branch block and right axis deviation  Cardiology was curbsided and expressed low concern for cardiac ischemia, instead thinking her symptoms were more likely secondary to GI etiology  She was treated with albuterol due to history of asthma, and she was treated with GI cocktail, after which her symptoms improved  She then progressed to full dilation and began to push  She delivered a viable male  on 3/24/2023 at [phone]  Weight 5lbs 10 1oz via normal spontaneous vaginal delivery  She sustained no lacerations during delivery  Apgars were 9 (1 min) and 9 (5 min)   was transferred to  nursery  Patient tolerated the procedure well  Her post-delivery course was uncomplicated  Her postpartum pain was well controlled with oral analgesics  On day of discharge, she was ambulating and able to reasonably perform all ADLs  She was voiding and had appropriate bowel function  Pain was well controlled   She was discharged home on postpartum day #1 without complications  Patient was instructed to follow up with her OB as an outpatient and was given appropriate warnings to call provider if she develops signs of infection or uncontrolled pain  Complications: none apparent    Condition at discharge: good     Discharge instructions/Information to patient and family:   See after visit summary for information provided to patient and family  Provisions for Follow-Up Care:  See after visit summary for information related to follow-up care and any pertinent home health orders  Disposition: See After Visit Summary for discharge disposition information  Planned Readmission: No    Discharge medications and instructions:   Please see AVS for full list of medications upon discharge

## 2023-03-24 NOTE — PLAN OF CARE
Problem: BIRTH - VAGINAL/ SECTION  Goal: Fetal and maternal status remain reassuring during the birth process  Description: INTERVENTIONS:  - Monitor vital signs  - Monitor fetal heart rate  - Monitor uterine activity  - Monitor labor progression (vaginal delivery)  - DVT prophylaxis  - Antibiotic prophylaxis  3/24/2023 0547 by Sylvia Fry RN  Outcome: Completed  3/23/2023 2236 by Sylvia Fry RN  Outcome: Progressing  Goal: Emotionally satisfying birthing experience for mother/fetus  Description: Interventions:  - Assess, plan, implement and evaluate the nursing care given to the patient in labor  - Advocate the philosophy that each childbirth experience is a unique experience and support the family's chosen level of involvement and control during the labor process   - Actively participate in both the patient's and family's teaching of the birth process  - Consider cultural, Yazidi and age-specific factors and plan care for the patient in labor  3/24/2023 0547 by Sylvia Fry RN  Outcome: Completed  3/23/2023 2236 by Sylvia Fry RN  Outcome: Progressing

## 2023-03-24 NOTE — OB LABOR/OXYTOCIN SAFETY PROGRESS
Labor Progress Note - Vinicio Hodges 28 y o  female MRN: [id number]    Unit/Bed#: L&D 322-01 Encounter: [id number]       Contraction Frequency (minutes): 4-6  Contraction Quality: Moderate  Tachysystole: No   Cervical Dilation: 5        Cervical Effacement: 90  Fetal Station: -1  Baseline Rate: 120 bpm  Fetal Heart Rate: 120 BPM  FHR Category: Category I               Vital Signs:   Vitals:    03/24/23 0054   BP:    Pulse: 67   Resp:    Temp:    SpO2: 100%       Notes/comments:   Patient resting comfortably s/p epidural   FHT category 1 with contractions every 4-6 min  SROM for clear fluid noted at 12:13 am   SVE unchanged at 5/90/-1  Will continue expectant management and consider pitocin for augmention if unchanged at next check  Dr Judson Arias aware          Reji Lenz MD 3/24/2023 12:45 am

## 2023-03-24 NOTE — PLAN OF CARE

## 2023-03-25 VITALS
TEMPERATURE: 97.7 F | DIASTOLIC BLOOD PRESSURE: 56 MMHG | SYSTOLIC BLOOD PRESSURE: 103 MMHG | WEIGHT: 164 LBS | OXYGEN SATURATION: 97 % | RESPIRATION RATE: 16 BRPM | BODY MASS INDEX: 32.2 KG/M2 | HEIGHT: 60 IN | HEART RATE: 60 BPM

## 2023-03-25 RX ORDER — IBUPROFEN 600 MG/1
600 TABLET ORAL EVERY 6 HOURS
Qty: 30 TABLET | Refills: 0 | Status: SHIPPED | OUTPATIENT
Start: 2023-03-25

## 2023-03-25 RX ADMIN — ACETAMINOPHEN 325MG 650 MG: 325 TABLET ORAL at 12:11

## 2023-03-25 RX ADMIN — ACETAMINOPHEN 325MG 650 MG: 325 TABLET ORAL at 06:31

## 2023-03-25 RX ADMIN — IBUPROFEN 600 MG: 600 TABLET, FILM COATED ORAL at 06:31

## 2023-03-25 RX ADMIN — ACETAMINOPHEN 325MG 650 MG: 325 TABLET ORAL at 00:36

## 2023-03-25 RX ADMIN — IBUPROFEN 600 MG: 600 TABLET, FILM COATED ORAL at 00:36

## 2023-03-25 RX ADMIN — IBUPROFEN 600 MG: 600 TABLET, FILM COATED ORAL at 12:11

## 2023-03-25 RX ADMIN — DOCUSATE SODIUM 100 MG: 100 CAPSULE, LIQUID FILLED ORAL at 09:07

## 2023-03-25 NOTE — PLAN OF CARE
Problem: POSTPARTUM  Goal: Experiences normal postpartum course  Description: INTERVENTIONS:  - Monitor maternal vital signs  - Assess uterine involution and lochia  Outcome: Completed  Goal: Appropriate maternal -  bonding  Description: INTERVENTIONS:  - Identify family support  - Assess for appropriate maternal/infant bonding   -Encourage maternal/infant bonding opportunities  - Referral to  or  as needed  Outcome: Completed  Goal: Establishment of infant feeding pattern  Description: INTERVENTIONS:  - Assess breast/bottle feeding  - Refer to lactation as needed  Outcome: Completed  Goal: Incision(s), wounds(s) or drain site(s) healing without S/S of infection  Description: INTERVENTIONS  - Assess and document dressing, incision, wound bed, drain sites and surrounding tissue  - Provide patient and family education  - Perform skin care/dressing changes every Outcome: Completed

## 2023-03-25 NOTE — PROGRESS NOTES
Progress Note - OB/GYN  Krystle Hopkins 28 y o  female MRN: [id number]  Unit/Bed#: L&D 312-01 Encounter: [id number]    Assessment and Plan     Krystle Hokpins is a patient of: Dr Vaishnavi Rust  She is PPD# 1 s/p  spontaneous vaginal delivery  Recovering well and is stable       No new Assessment & Plan notes have been filed under this hospital service since the last note was generated  Service: OB/GYN      Disposition    - Anticipate discharge home on PPD# 1      Subjective/Objective     Chief Complaint: Postpartum State     Subjective:    Krystle Hopkins is PPD#1 s/p  spontaneous vaginal delivery  She has no current complaints  Pain is well controlled  Patient is currently voiding  She is ambulating  Patient is currently passing flatus and has had no bowel movement  She is tolerating PO, and denies nausea or vomitting  Patient denies fever, chills, chest pain, shortness of breath, or calf tenderness  Lochia is normal  She is  Breastfeeding  She is recovering well and is stable         Vitals:   /56 (BP Location: Right arm)   Pulse 60   Temp 97 7 °F (36 5 °C) (Temporal)   Resp 16   Ht 5' (1 524 m)   Wt 74 4 kg (164 lb)   LMP 07/03/2022   SpO2 97%   Breastfeeding Yes   BMI 32 03 kg/m²       Intake/Output Summary (Last 24 hours) at 3/25/2023 1121  Last data filed at 3/24/2023 1300  Gross per 24 hour   Intake --   Output 600 ml   Net -600 ml       Invasive Devices     None                 Physical Exam:   GEN: Krystle Hopkins appears well, alert and oriented x 3, pleasant and cooperative   CARDIO: RRR, no murmurs or rubs  RESP:  CTAB, no wheezes or rales  ABDOMEN: soft, no tenderness, no distention, fundus @ 2 below umbilicus, I  EXTREMITIES: SCDs on, non tender, no erythema      Labs:     Hemoglobin   Date Value Ref Range Status   03/23/2023 10 1 (L) 11 5 - 15 4 g/dL Final   01/16/2023 10 2 (L) 11 5 - 15 4 g/dL Final   10/30/2017 9 8  Final     Comment: Performed at: In Office  ,       WBC   Date Value Ref Range Status   03/23/2023 17 45 (H) 4 31 - 10 16 Thousand/uL Final   01/16/2023 11 71 (H) 4 31 - 10 16 Thousand/uL Final     Platelets   Date Value Ref Range Status   03/23/2023 326 149 - 390 Thousands/uL Final   01/16/2023 374 149 - 390 Thousands/uL Final     Creatinine   Date Value Ref Range Status   03/27/2022 0 67 0 60 - 1 30 mg/dL Final     Comment:     Standardized to IDMS reference method   09/01/2020 0 93 0 60 - 1 30 mg/dL Final     Comment:     Standardized to IDMS reference method     AST   Date Value Ref Range Status   03/27/2022 6 5 - 45 U/L Final     Comment:     Specimen collection should occur prior to Sulfasalazine administration due to the potential for falsely depressed results  09/01/2020 27 5 - 45 U/L Final     Comment:     Specimen collection should occur prior to Sulfasalazine administration due to the potential for falsely depressed results  ALT   Date Value Ref Range Status   03/27/2022 21 12 - 78 U/L Final     Comment:     Specimen collection should occur prior to Sulfasalazine administration due to the potential for falsely depressed results  09/01/2020 51 12 - 78 U/L Final     Comment:     Specimen collection should occur prior to Sulfasalazine administration due to the potential for falsely depressed results             Renetta Trejo DO  3/25/2023  11:21 AM

## 2023-03-27 NOTE — UTILIZATION REVIEW
"NOTIFICATION OF INPATIENT ADMISSION   MATERNITY/DELIVERY AUTHORIZATION REQUEST   SERVICING FACILITY:   [address] [place] [address] - L&D, [place], NICU  [address] [place] [address], [place] [address] [address] [facility]  Tax ID: [id number]  NPI: [id number] ATTENDING PROVIDER:  Attending Name and NPI#: Nidhi Alonso Md [[id number]]  Address: [address] [place] [address] [facility]  Phone: [phone]     ADMISSION INFORMATION:  [facility]  Place of Service Code: 21  Inpatient Admission Date/Time: 3/23/23 10:21 PM  Discharge Date/Time: 3/25/2023 12:59 PM  Admitting Diagnosis Code/Description:  Abdominal pain during pregnancy in third trimester [O26 893, R10 9]  Decreased fetal movement [O36 8190]  Vaginal bleeding in pregnancy, third trimester [O46 93]     Mother: Jeanie Carver 1994 Estimated Date of Delivery: 4/3/23  Delivering clinician:     OB History        8    Para   4    Term   4            AB   4    Living   4       SAB   2    IAB   2    Ectopic        Multiple   0    Live Births   4           Obstetric Comments   Menarche 11  Cycles Q 28d            Name & MRN:   Information for the patient's :  [employer] [[id number]]     [place] Delivery Information:  Sex: male  Delivered 3/24/2023 5:13 AM by Vaginal, Spontaneous; Gestational Age: 38w3d    [place] Measurements:  Weight: 5 lb 10 1 oz (2555 g); Height: 19\"    APGAR 1 minute 5 minutes 10 minutes   Totals: 9 9       Birth Information: 28 y o  female MRN: [id number] Unit/Bed#: L&D 312-01   Birthweight: No birth weight on file  Gestational Age: <None> Delivery Type:    APGARS Totals:        UTILIZATION REVIEW CONTACT:  Sharon Lei, Utilization   Network Utilization Review Department  Phone: [phone]  Fax [phone]  Email: [email or web address]  Contact for approvals/pending authorizations, clinical reviews, and discharge     " PHYSICIAN ADVISORY SERVICES:  Medical Necessity Denial & Gmiz-xk-Ggbp Review  Phone: [phone]  Fax: [phone]  Email: [email or web address]

## 2023-04-01 LAB — PLACENTA IN STORAGE: NORMAL

## 2023-07-11 ENCOUNTER — OFFICE VISIT (OUTPATIENT)
Dept: OBGYN CLINIC | Facility: CLINIC | Age: 28
End: 2023-07-11

## 2023-07-11 VITALS
DIASTOLIC BLOOD PRESSURE: 58 MMHG | OXYGEN SATURATION: 99 % | WEIGHT: 158.6 LBS | BODY MASS INDEX: 31.14 KG/M2 | HEIGHT: 60 IN | SYSTOLIC BLOOD PRESSURE: 100 MMHG

## 2023-07-11 NOTE — PROGRESS NOTES
Assessment/Plan:    1. Postpartum exam  Normal exam.  PPD score of 13. Patient and her partner feel her irritability is normal.  She will however call me if things worsen and if she desires to initiate zoloft. She has Baby and Me information in case she decides to seek [facility] counseling services. RV for Nexplanon insertion (advised to abstain or use condoms prior to insertion). Subjective:      Patient ID: Helene Rose is a 28 y.o. female. HPI  POSTPARTUM EXAM    29 yo   post  of 3/24/23, 38w4d, male, 5lbs 10 oz    Breast and formula  She is sleeping well  Denies problems with urination or bowels. Has not gotten a period. Has been sexually active, used a condom. Desires Nexplanon. PPD 13. Has a h/o PPD in the past that was severe. This time is not as severe, just gets irritable quickly. Partner is home to help, his mother is also available to help. Her 4 other children go to day care. The following portions of the patient's history were reviewed and updated as appropriate: allergies, current medications, past family history, past medical history, past social history, past surgical history and problem list.    Review of Systems   Constitutional: Negative for chills and fever. Gastrointestinal: Negative. Genitourinary: Negative. Objective:    /58 (BP Location: Right arm, Patient Position: Sitting, Cuff Size: Standard)   Ht 5' (1.524 m)   Wt 71.9 kg (158 lb 9.6 oz)   LMP  (LMP Unknown)   SpO2 99%   Breastfeeding Yes   BMI 30.97 kg/m²      Physical Exam  Constitutional:       General: She is not in acute distress. Appearance: Normal appearance. She is well-developed. She is not ill-appearing or diaphoretic. Comments: bmi 31   HENT:      Head: Normocephalic and atraumatic. Eyes:      Pupils: Pupils are equal, round, and reactive to light. Pulmonary:      Effort: Pulmonary effort is normal.   Abdominal:      General: Abdomen is flat. Palpations: Abdomen is soft. Genitourinary:     General: Normal vulva. Exam position: Lithotomy position. Labia:         Right: No rash, tenderness, lesion or injury. Left: No rash, tenderness, lesion or injury. Vagina: No signs of injury and foreign body. No vaginal discharge, erythema, tenderness or bleeding. Cervix: No cervical motion tenderness, discharge or friability. Uterus: Not enlarged and not tender. Adnexa:         Right: No mass or tenderness. Left: No mass or tenderness. Skin:     General: Skin is warm and dry. Neurological:      General: No focal deficit present. Mental Status: She is alert and oriented to person, place, and time. Psychiatric:         Mood and Affect: Mood normal.         Behavior: Behavior normal.         Thought Content:  Thought content normal.         Judgment: Judgment normal.

## 2023-07-30 ENCOUNTER — HOSPITAL ENCOUNTER (EMERGENCY)
Facility: HOSPITAL | Age: 28
Discharge: HOME | End: 2023-07-30
Attending: EMERGENCY MEDICINE
Payer: MEDICARE

## 2023-07-30 VITALS
DIASTOLIC BLOOD PRESSURE: 67 MMHG | SYSTOLIC BLOOD PRESSURE: 113 MMHG | OXYGEN SATURATION: 98 % | TEMPERATURE: 98.1 F | BODY MASS INDEX: 30.45 KG/M2 | RESPIRATION RATE: 20 BRPM | HEART RATE: 80 BPM | WEIGHT: 155.9 LBS

## 2023-07-30 DIAGNOSIS — K02.9 PAIN DUE TO DENTAL CARIES: Primary | ICD-10-CM

## 2023-07-30 PROCEDURE — 99282 EMERGENCY DEPT VISIT SF MDM: CPT

## 2023-07-30 PROCEDURE — 99284 EMERGENCY DEPT VISIT MOD MDM: CPT | Performed by: EMERGENCY MEDICINE

## 2023-07-30 RX ORDER — PENICILLIN V POTASSIUM 250 MG/1
500 TABLET ORAL ONCE
Status: COMPLETED | OUTPATIENT
Start: 2023-07-30 | End: 2023-07-30

## 2023-07-30 RX ORDER — PENICILLIN V POTASSIUM 500 MG/1
500 TABLET ORAL 4 TIMES DAILY
Qty: 28 TABLET | Refills: 0 | Status: SHIPPED | OUTPATIENT
Start: 2023-07-30 | End: 2023-08-06

## 2023-07-30 RX ADMIN — PENICILLIN V POTASSIUM 500 MG: 250 TABLET, FILM COATED ORAL at 19:04

## 2023-07-30 NOTE — ED PROVIDER NOTES
History  Chief Complaint   Patient presents with   • Dental Pain     Reports cracked tooth on left side that is now throbbing d/t pain. No meds PTA. Patient is a 58-year-old female with a cracked left upper tooth. Pain started today. Throbbing constant. No radiation. No f/s/c. No difficulty swallowing or breathing. Non smoker. Prior to Admission Medications   Prescriptions Last Dose Informant Patient Reported? Taking?    Ferrous Sulfate Dried ER (Slow Release Iron) 160 (50 Fe) MG TBCR   No No   Sig: One daily   Patient not taking: Reported on 7/11/2023   Prenatal Vit w/Ui-Picqsgkwj-RM (PNV PO)  Self Yes No   Sig: Take 1 tablet by mouth daily   Patient not taking: Reported on 7/11/2023   albuterol (PROVENTIL HFA,VENTOLIN HFA) 90 mcg/act inhaler  Self Yes No   Sig: Inhale 2 puffs every 6 (six) hours as needed for wheezing   ibuprofen (MOTRIN) 600 mg tablet   No No   Sig: Take 1 tablet (600 mg total) by mouth every 6 (six) hours   Patient not taking: Reported on 7/11/2023      Facility-Administered Medications: None       Past Medical History:   Diagnosis Date   • Abnormal Pap smear of cervix     1/2020 pap negative;   • Anemia    • Asthma     uses inhaler   • Depression     in the past   • Genetic screening     9/2022-Hgb AA   • Gonorrhea 2012   • Migraine     tylenol   • Miscarriage     x 1   • Urinary tract infection    • Varicella     patient had chicken pox       Past Surgical History:   Procedure Laterality Date   • TONSILLECTOMY         Family History   Problem Relation Age of Onset   • Migraines Mother    • Asthma Mother    • Transient ischemic attack Father    • Asthma Father    • No Known Problems Son    • No Known Problems Son    • Cancer Maternal Grandmother         lung   • Cancer Paternal Grandmother         ? type   • Breast cancer Family    • No Known Problems Half-Sister    • No Known Problems Half-Sister    • No Known Problems Half-Sister    • Asthma Half-Brother    • No Known Problems Half-Brother    • Colon cancer Neg Hx    • Ovarian cancer Neg Hx    • Deep vein thrombosis Neg Hx    • Pulmonary embolism Neg Hx    • Venous thrombosis Neg Hx    • Diabetes Neg Hx    • Heart attack Neg Hx    • Heart disease Neg Hx    • Hypertension Neg Hx    • Miscarriages / Stillbirths Neg Hx    • Seizures Neg Hx    • Thyroid disease Neg Hx      I have reviewed and agree with the history as documented. E-Cigarette/Vaping   • E-Cigarette Use Never User      E-Cigarette/Vaping Substances   • Nicotine No    • THC No    • CBD No    • Flavoring No    • Other No    • Unknown No      Social History     Tobacco Use   • Smoking status: Never   • Smokeless tobacco: Never   Vaping Use   • Vaping Use: Never used   Substance Use Topics   • Alcohol use: Not Currently   • Drug use: No       Review of Systems   Constitutional: Negative. HENT: Positive for dental problem. Eyes: Negative. Respiratory: Negative. Cardiovascular: Negative. Gastrointestinal: Negative. Endocrine: Negative. Genitourinary: Negative. Musculoskeletal: Negative. Skin: Negative. Allergic/Immunologic: Negative. Neurological: Negative. Hematological: Negative. Psychiatric/Behavioral: Negative. All other systems reviewed and are negative. Physical Exam  Physical Exam  Vitals and nursing note reviewed. Constitutional:       Appearance: Normal appearance. She is normal weight. HENT:      Head: Normocephalic and atraumatic. Right Ear: Tympanic membrane, ear canal and external ear normal.      Left Ear: Tympanic membrane, ear canal and external ear normal.      Nose: Nose normal.      Mouth/Throat:      Mouth: Mucous membranes are moist.      Pharynx: Oropharynx is clear. Comments: Large missing portion of left upper 1st premolar. No fluctuance. No trismus, no tongue protrusion or brawny discoloration under the chin. Cardiovascular:      Rate and Rhythm: Normal rate and regular rhythm. Pulses: Normal pulses. Heart sounds: Normal heart sounds. Pulmonary:      Effort: Pulmonary effort is normal.      Breath sounds: Normal breath sounds. Musculoskeletal:      Cervical back: Normal range of motion and neck supple. Lymphadenopathy:      Cervical: No cervical adenopathy. Skin:     General: Skin is warm and dry. Capillary Refill: Capillary refill takes less than 2 seconds. Neurological:      General: No focal deficit present. Mental Status: She is alert and oriented to person, place, and time. Psychiatric:         Mood and Affect: Mood normal.         Behavior: Behavior normal.         Vital Signs  ED Triage Vitals [07/30/23 1856]   Temperature Pulse Respirations Blood Pressure SpO2   98.1 °F (36.7 °C) 80 20 113/67 98 %      Temp Source Heart Rate Source Patient Position - Orthostatic VS BP Location FiO2 (%)   Oral Monitor Sitting Left arm --      Pain Score       --           Vitals:    07/30/23 1856   BP: 113/67   Pulse: 80   Patient Position - Orthostatic VS: Sitting         Visual Acuity      ED Medications  Medications   penicillin V potassium (VEETID) tablet 500 mg (500 mg Oral Given 7/30/23 1904)       Diagnostic Studies  Results Reviewed     None                 No orders to display              Procedures  Procedures         ED Course                                             Medical Decision Making  Pain due to dental caries: chronic illness or injury     Details: worsening pain today. no dentist on Sunday. abx, follow up with dental.  no airway issues. Risk  Prescription drug management.           Disposition  Final diagnoses:   Pain due to dental caries     Time reflects when diagnosis was documented in both MDM as applicable and the Disposition within this note     Time User Action Codes Description Comment    7/30/2023  7:00 PM Sue Cartwright Add [K02.9] Pain due to dental caries       ED Disposition     ED Disposition   Discharge    Condition   Stable Date/Time   Sun Jul 30, 2023  7:00 PM    Comment   [address] discharge to home/self care. Follow-up Information     Follow up With Specialties Details Why [address]    [address] [facility]          Patient's Medications   Discharge Prescriptions    PENICILLIN V POTASSIUM (VEETID) 500 MG TABLET    Take 1 tablet (500 mg total) by mouth 4 (four) times a day for 7 days       Start Date: 7/30/2023 End Date: 8/6/2023       Order Dose: 500 mg       Quantity: 28 tablet    Refills: 0       No discharge procedures on file.     PDMP Review     None          ED Provider  Electronically Signed by           Quinton Ludwig MD  07/30/23 0949

## 2023-08-02 ENCOUNTER — PROCEDURE VISIT (OUTPATIENT)
Dept: OBGYN CLINIC | Facility: CLINIC | Age: 28
End: 2023-08-02
Payer: MEDICARE

## 2023-08-02 VITALS
SYSTOLIC BLOOD PRESSURE: 108 MMHG | DIASTOLIC BLOOD PRESSURE: 62 MMHG | WEIGHT: 155 LBS | HEART RATE: 64 BPM | BODY MASS INDEX: 30.27 KG/M2

## 2023-08-02 DIAGNOSIS — Z30.017 NEXPLANON INSERTION: ICD-10-CM

## 2023-08-02 DIAGNOSIS — Z01.818 PRE-PROCEDURAL EXAMINATION: Primary | ICD-10-CM

## 2023-08-02 LAB — SL AMB POCT URINE HCG: NORMAL

## 2023-08-02 PROCEDURE — 81025 URINE PREGNANCY TEST: CPT | Performed by: OBSTETRICS & GYNECOLOGY

## 2023-08-02 PROCEDURE — 11981 INSERTION DRUG DLVR IMPLANT: CPT | Performed by: OBSTETRICS & GYNECOLOGY

## 2023-08-02 NOTE — PROGRESS NOTES
Universal Protocol:  Consent: Verbal consent obtained. Written consent obtained. Risks and benefits: risks, benefits and alternatives were discussed  Consent given by: patient  Time out: Immediately prior to procedure a "time out" was called to verify the correct patient, procedure, equipment, support staff and site/side marked as required. Timeout called at: 8/2/2023 11:35 AM.  Patient understanding: patient states understanding of the procedure being performed  Patient consent: the patient's understanding of the procedure matches consent given  Procedure consent: procedure consent matches procedure scheduled  Test results: test results available and properly labeled  Site marked: the operative site was marked  Required items: required blood products, implants, devices, and special equipment available  Patient identity confirmed: verbally with patient    Remove and insert drug implant    Date/Time: 8/2/2023 11:35 AM    Performed by: ALEXIS Bergeron  Authorized by: ALEXIS Bergeron    Indication:     Indication: Insertion of non-biodegradable drug delivery implant    Pre-procedure:     Pre-procedure timeout performed: yes      Prepped with: povidone-iodine      Local anesthetic:  Lidocaine with epinephrine    The site was cleaned and prepped in a sterile fashion: yes    Procedure:     Procedure: Insertion    Left/right:  Left    Preloaded contraceptive capsule trocar was placed subdermally: yes      Visualization of implant was obtained: yes      Contraceptive capsule was inserted and trocar removed: yes      Visualization of notch in stylet and palpation of device: yes      Palpation confirms placement by provider and patient: yes      Site was closed with steri-strips and pressure bandage applied: yes    Comments:      Post procedure instructions given. Pt will RTO in 2 weeks for site check.

## 2023-08-17 ENCOUNTER — TELEPHONE (OUTPATIENT)
Dept: INTERNAL MEDICINE CLINIC | Facility: CLINIC | Age: 28
End: 2023-08-17

## 2023-08-21 ENCOUNTER — TELEPHONE (OUTPATIENT)
Dept: OBGYN CLINIC | Facility: CLINIC | Age: 28
End: 2023-08-21

## 2023-09-16 ENCOUNTER — HOSPITAL ENCOUNTER (EMERGENCY)
Facility: HOSPITAL | Age: 28
Discharge: HOME | End: 2023-09-16
Attending: INTERNAL MEDICINE
Payer: MEDICARE

## 2023-09-16 VITALS
BODY MASS INDEX: 29.33 KG/M2 | RESPIRATION RATE: 20 BRPM | HEART RATE: 66 BPM | DIASTOLIC BLOOD PRESSURE: 70 MMHG | OXYGEN SATURATION: 99 % | SYSTOLIC BLOOD PRESSURE: 131 MMHG | TEMPERATURE: 97.6 F | WEIGHT: 150.2 LBS

## 2023-09-16 DIAGNOSIS — R51.9 HEADACHE: Primary | ICD-10-CM

## 2023-09-16 DIAGNOSIS — R11.0 NAUSEA: ICD-10-CM

## 2023-09-16 PROCEDURE — 99282 EMERGENCY DEPT VISIT SF MDM: CPT

## 2023-09-16 PROCEDURE — 96372 THER/PROPH/DIAG INJ SC/IM: CPT

## 2023-09-16 PROCEDURE — 99284 EMERGENCY DEPT VISIT MOD MDM: CPT | Performed by: INTERNAL MEDICINE

## 2023-09-16 RX ORDER — ONDANSETRON 4 MG/1
4 TABLET, ORALLY DISINTEGRATING ORAL EVERY 6 HOURS PRN
Qty: 20 TABLET | Refills: 0 | Status: SHIPPED | OUTPATIENT
Start: 2023-09-16

## 2023-09-16 RX ORDER — KETOROLAC TROMETHAMINE 30 MG/ML
30 INJECTION, SOLUTION INTRAMUSCULAR; INTRAVENOUS ONCE
Status: COMPLETED | OUTPATIENT
Start: 2023-09-16 | End: 2023-09-16

## 2023-09-16 RX ORDER — ONDANSETRON 4 MG/1
4 TABLET, ORALLY DISINTEGRATING ORAL ONCE
Status: COMPLETED | OUTPATIENT
Start: 2023-09-16 | End: 2023-09-16

## 2023-09-16 RX ADMIN — ONDANSETRON 4 MG: 4 TABLET, ORALLY DISINTEGRATING ORAL at 11:24

## 2023-09-16 RX ADMIN — KETOROLAC TROMETHAMINE 30 MG: 30 INJECTION, SOLUTION INTRAMUSCULAR; INTRAVENOUS at 11:24

## 2023-09-16 NOTE — ED PROVIDER NOTES
History  Chief Complaint   Patient presents with   • Headache     Pressure and burning headache     HPI  19-year-old woman presents to ED for evaluation of headache. Patient describes dull bifrontal headache. Headache started last night and gradually worsened. Increased headache since April 2023. Does not have a PCP or has followed up with anyone. She is not sure if symptoms are related to menses, caffeine intake, sleep or hydration. Tried motrin with some relief yesterday, has not given anything today. Has not tried any other medications or therapies. Some associated mild nausea but no vomiting. No associated photophobia or phonophobia. No visual changes or vertigo. Is not the worst headache of her life. It was not sudden or maximal in onset. She denies fevers, immunocompromise state, no neck pain, no pregnancy, no carbon monoxide exposure, no neck manipulation. No history of clotting disorders. And has no complaints or concerns. Prior to Admission Medications   Prescriptions Last Dose Informant Patient Reported? Taking?    Ferrous Sulfate Dried ER (Slow Release Iron) 160 (50 Fe) MG TBCR   No No   Sig: One daily   Patient not taking: Reported on 7/11/2023   Prenatal Vit w/Kb-Lsxxdfzwc-TQ (PNV PO)  Self Yes No   Sig: Take 1 tablet by mouth daily   Patient not taking: Reported on 7/11/2023   albuterol (PROVENTIL HFA,VENTOLIN HFA) 90 mcg/act inhaler  Self Yes No   Sig: Inhale 2 puffs every 6 (six) hours as needed for wheezing   ibuprofen (MOTRIN) 600 mg tablet   No No   Sig: Take 1 tablet (600 mg total) by mouth every 6 (six) hours   Patient not taking: Reported on 7/11/2023      Facility-Administered Medications: None       Past Medical History:   Diagnosis Date   • Abnormal Pap smear of cervix     1/2020 pap negative;   • Anemia    • Asthma     uses inhaler   • Depression     in the past   • Genetic screening     9/2022-Hgb AA   • Gonorrhea 2012   • Migraine     tylenol   • Miscarriage     x 1   • Urinary tract infection    • Varicella     patient had chicken pox       Past Surgical History:   Procedure Laterality Date   • TONSILLECTOMY         Family History   Problem Relation Age of Onset   • Migraines Mother    • Asthma Mother    • Transient ischemic attack Father    • Asthma Father    • No Known Problems Son    • No Known Problems Son    • Cancer Maternal Grandmother         lung   • Cancer Paternal Grandmother         ? type   • Breast cancer Family    • No Known Problems Half-Sister    • No Known Problems Half-Sister    • No Known Problems Half-Sister    • Asthma Half-Brother    • No Known Problems Half-Brother    • Colon cancer Neg Hx    • Ovarian cancer Neg Hx    • Deep vein thrombosis Neg Hx    • Pulmonary embolism Neg Hx    • Venous thrombosis Neg Hx    • Diabetes Neg Hx    • Heart attack Neg Hx    • Heart disease Neg Hx    • Hypertension Neg Hx    • Miscarriages / Stillbirths Neg Hx    • Seizures Neg Hx    • Thyroid disease Neg Hx      I have reviewed and agree with the history as documented. E-Cigarette/Vaping   • E-Cigarette Use Never User      E-Cigarette/Vaping Substances   • Nicotine No    • THC No    • CBD No    • Flavoring No    • Other No    • Unknown No      Social History     Tobacco Use   • Smoking status: Never   • Smokeless tobacco: Never   Vaping Use   • Vaping Use: Never used   Substance Use Topics   • Alcohol use: Not Currently   • Drug use: No       Review of Systems   All other systems reviewed and are negative. Physical Exam  Physical Exam   PHYSICAL EXAM    Constitutional:  Well developed, well nourished, no acute distress, non-toxic appearance    HEENT:  Conjunctiva normal. Oropharynx moist  Respiratory:  No respiratory distress, normal breath sounds  Cardiovascular:  Normal rate, normal rhythm, no murmurs  GI:  Soft, nondistended, normal bowel sounds, nontender  :  No costovertebral angle tenderness   Musculoskeletal:  No edema, no tenderness, no deformities. Integument:  Well hydrated, no rash   Lymphatic:  No lymphadenopathy noted   Neurologic:  Alert & oriented, normal motor function, normal sensory function, no focal deficits noted   Psychiatric:  Speech and behavior appropriate     Vital Signs  ED Triage Vitals   Temperature Pulse Respirations Blood Pressure SpO2   09/16/23 1059 09/16/23 1059 09/16/23 1059 09/16/23 1059 09/16/23 1059   97.6 °F (36.4 °C) 66 20 131/70 99 %      Temp Source Heart Rate Source Patient Position - Orthostatic VS BP Location FiO2 (%)   09/16/23 1059 09/16/23 1059 -- 09/16/23 1059 --   Tympanic Monitor  Left arm       Pain Score       09/16/23 1124       5           Vitals:    09/16/23 1059   BP: 131/70   Pulse: 66         Visual Acuity      ED Medications  Medications   ketorolac (TORADOL) injection 30 mg (30 mg Intramuscular Given 9/16/23 1124)   ondansetron (ZOFRAN-ODT) dispersible tablet 4 mg (4 mg Oral Given 9/16/23 1124)       Diagnostic Studies  Results Reviewed     None                 No orders to display              Procedures  Procedures         ED Course                               SBIRT 20yo+    Flowsheet Row Most Recent Value   Initial Alcohol Screen: US AUDIT-C     1. How often do you have a drink containing alcohol? 0 Filed at: 09/16/2023 1100   2. How many drinks containing alcohol do you have on a typical day you are drinking? 0 Filed at: 09/16/2023 1100   3a. Male UNDER 65: How often do you have five or more drinks on one occasion? 0 Filed at: 09/16/2023 1100   3b. FEMALE Any Age, or MALE 65+: How often do you have 4 or more drinks on one occassion? 0 Filed at: 09/16/2023 1100   Audit-C Score 0 Filed at: 09/16/2023 1100   LEONARD: How many times in the past year have you. .. Used an illegal drug or used a prescription medication for non-medical reasons?  Never Filed at: 09/16/2023 1100                    Medical Decision Making  Patient reports headache is similar to prior chronic headaches and is thus likely associated with a headache syndrome such as migraine headaches, tension headaches etc.  There are no focal neurologic findings on exam to indicate a neurologic dysfunction or stroke. Patient's headache was not sudden in onset and is not maximal intensity at onset to suggest a subarachnoid hemorrhage. Patient does not have a fever and is not immunocompromised and denies any neck stiffness to suggest meningitis. Patient has not had a progressively worsening headache or headache worse in the morning to suggest malignancy. Patient denies any jaw claudication, muscle aches, temporal artery pain to suggest temporal arteritis. Unlikely carbon monoxide poisoning as there are not multiple patients with similar complaints at home. Patient is not pregnant and is not newly post pregnancy with hypertension to suggest a press syndrome or pregnancy related headache. Patient denies a history of clotting disorders to suggest a thrombus or thromboembolic event. Patient has not had any trauma and denies any traumatic inciting event to suggest intracranial hemorrhage. Patient denies any eye pain or facial pain to suggest ophthalmologic origin such as glaucoma or trigeminal neuralgia. Patient denies any cervical manipulation, injections into the spine, facial pain or headaches to suggest cervical spinal cause or spinal headache. Due to the reasoning listed above, blood work and imaging was not pursued as it was not indicated at this time. Risk  OTC drugs. Prescription drug management.           Disposition  Final diagnoses:   Headache   Nausea     Time reflects when diagnosis was documented in both MDM as applicable and the Disposition within this note     Time User Action Codes Description Comment    9/16/2023 11:31 AM Joaquim Alexander [R51.9] Headache     9/16/2023 11:31 AM Joaquim Alexander [R11.0] Nausea       ED Disposition     ED Disposition   Discharge    Condition   Stable    Date/Time   Sat Sep 16, 2023 11:31 AM    Comment   [address] [facility] discharge to home/self care. Follow-up Information    None         Patient's Medications   Discharge Prescriptions    ASPIRIN-ACETAMINOPHEN-CAFFEINE (EXCEDRIN MIGRAINE) 250-250-65 MG PER TABLET    Take 1 tablet by mouth every 6 (six) hours as needed for headaches       Start Date: 9/16/2023 End Date: --       Order Dose: 1 tablet       Quantity: 30 tablet    Refills: 0    ONDANSETRON (ZOFRAN-ODT) 4 MG DISINTEGRATING TABLET    Take 1 tablet (4 mg total) by mouth every 6 (six) hours as needed for nausea or vomiting       Start Date: 9/16/2023 End Date: --       Order Dose: 4 mg       Quantity: 20 tablet    Refills: 0       No discharge procedures on file.     PDMP Review     None          ED Provider  Electronically Signed by           Emily Decker MD  09/16/23 6523

## 2023-09-27 ENCOUNTER — APPOINTMENT (OUTPATIENT)
Dept: LAB | Facility: CLINIC | Age: 28
End: 2023-09-27
Payer: MEDICARE

## 2023-09-27 ENCOUNTER — OFFICE VISIT (OUTPATIENT)
Dept: FAMILY MEDICINE CLINIC | Facility: CLINIC | Age: 28
End: 2023-09-27
Payer: MEDICARE

## 2023-09-27 VITALS
BODY MASS INDEX: 29.06 KG/M2 | SYSTOLIC BLOOD PRESSURE: 104 MMHG | DIASTOLIC BLOOD PRESSURE: 62 MMHG | HEIGHT: 60 IN | WEIGHT: 148 LBS | TEMPERATURE: 97.9 F | HEART RATE: 72 BPM

## 2023-09-27 DIAGNOSIS — G89.29 CHRONIC NONINTRACTABLE HEADACHE, UNSPECIFIED HEADACHE TYPE: Primary | ICD-10-CM

## 2023-09-27 DIAGNOSIS — Z13.220 LIPID SCREENING: ICD-10-CM

## 2023-09-27 DIAGNOSIS — R23.4 BREAST SKIN CHANGES: ICD-10-CM

## 2023-09-27 DIAGNOSIS — R51.9 CHRONIC NONINTRACTABLE HEADACHE, UNSPECIFIED HEADACHE TYPE: Primary | ICD-10-CM

## 2023-09-27 DIAGNOSIS — O99.013 ANEMIA AFFECTING PREGNANCY IN THIRD TRIMESTER: ICD-10-CM

## 2023-09-27 LAB
ALBUMIN SERPL BCP-MCNC: 4.6 G/DL (ref 3.5–5)
ALP SERPL-CCNC: 61 U/L (ref 34–104)
ALT SERPL W P-5'-P-CCNC: 11 U/L (ref 7–52)
ANION GAP SERPL CALCULATED.3IONS-SCNC: 8 MMOL/L
AST SERPL W P-5'-P-CCNC: 11 U/L (ref 13–39)
BASOPHILS # BLD AUTO: 0.03 THOUSANDS/ÂΜL (ref 0–0.1)
BASOPHILS NFR BLD AUTO: 0 % (ref 0–1)
BILIRUB SERPL-MCNC: 0.64 MG/DL (ref 0.2–1)
BUN SERPL-MCNC: 9 MG/DL (ref 5–25)
CALCIUM SERPL-MCNC: 9.6 MG/DL (ref 8.4–10.2)
CHLORIDE SERPL-SCNC: 105 MMOL/L (ref 96–108)
CHOLEST SERPL-MCNC: 122 MG/DL
CO2 SERPL-SCNC: 27 MMOL/L (ref 21–32)
CREAT SERPL-MCNC: 0.72 MG/DL (ref 0.6–1.3)
EOSINOPHIL # BLD AUTO: 0.09 THOUSAND/ÂΜL (ref 0–0.61)
EOSINOPHIL NFR BLD AUTO: 1 % (ref 0–6)
ERYTHROCYTE [DISTWIDTH] IN BLOOD BY AUTOMATED COUNT: 14.1 % (ref 11.6–15.1)
FERRITIN SERPL-MCNC: 10 NG/ML (ref 11–307)
GFR SERPL CREATININE-BSD FRML MDRD: 113 ML/MIN/1.73SQ M
GLUCOSE P FAST SERPL-MCNC: 85 MG/DL (ref 65–99)
HCT VFR BLD AUTO: 40.2 % (ref 34.8–46.1)
HDLC SERPL-MCNC: 41 MG/DL
HGB BLD-MCNC: 12.4 G/DL (ref 11.5–15.4)
IMM GRANULOCYTES # BLD AUTO: 0.02 THOUSAND/UL (ref 0–0.2)
IMM GRANULOCYTES NFR BLD AUTO: 0 % (ref 0–2)
IRON SATN MFR SERPL: 8 % (ref 15–50)
IRON SERPL-MCNC: 30 UG/DL (ref 50–212)
LDLC SERPL CALC-MCNC: 68 MG/DL (ref 0–100)
LYMPHOCYTES # BLD AUTO: 2.15 THOUSANDS/ÂΜL (ref 0.6–4.47)
LYMPHOCYTES NFR BLD AUTO: 25 % (ref 14–44)
MCH RBC QN AUTO: 25.5 PG (ref 26.8–34.3)
MCHC RBC AUTO-ENTMCNC: 30.8 G/DL (ref 31.4–37.4)
MCV RBC AUTO: 83 FL (ref 82–98)
MONOCYTES # BLD AUTO: 0.48 THOUSAND/ÂΜL (ref 0.17–1.22)
MONOCYTES NFR BLD AUTO: 6 % (ref 4–12)
NEUTROPHILS # BLD AUTO: 5.97 THOUSANDS/ÂΜL (ref 1.85–7.62)
NEUTS SEG NFR BLD AUTO: 68 % (ref 43–75)
NONHDLC SERPL-MCNC: 81 MG/DL
NRBC BLD AUTO-RTO: 0 /100 WBCS
PLATELET # BLD AUTO: 390 THOUSANDS/UL (ref 149–390)
PMV BLD AUTO: 10 FL (ref 8.9–12.7)
POTASSIUM SERPL-SCNC: 4 MMOL/L (ref 3.5–5.3)
PROT SERPL-MCNC: 7.3 G/DL (ref 6.4–8.4)
RBC # BLD AUTO: 4.86 MILLION/UL (ref 3.81–5.12)
SODIUM SERPL-SCNC: 140 MMOL/L (ref 135–147)
TIBC SERPL-MCNC: 380 UG/DL (ref 250–450)
TRIGL SERPL-MCNC: 66 MG/DL
TSH SERPL DL<=0.05 MIU/L-ACNC: 1.16 UIU/ML (ref 0.45–4.5)
UIBC SERPL-MCNC: 350 UG/DL (ref 155–355)
WBC # BLD AUTO: 8.74 THOUSAND/UL (ref 4.31–10.16)

## 2023-09-27 PROCEDURE — 99204 OFFICE O/P NEW MOD 45 MIN: CPT | Performed by: PHYSICIAN ASSISTANT

## 2023-09-27 PROCEDURE — 82728 ASSAY OF FERRITIN: CPT | Performed by: PHYSICIAN ASSISTANT

## 2023-09-27 PROCEDURE — 36415 COLL VENOUS BLD VENIPUNCTURE: CPT | Performed by: PHYSICIAN ASSISTANT

## 2023-09-27 PROCEDURE — 84443 ASSAY THYROID STIM HORMONE: CPT | Performed by: PHYSICIAN ASSISTANT

## 2023-09-27 PROCEDURE — 85025 COMPLETE CBC W/AUTO DIFF WBC: CPT | Performed by: PHYSICIAN ASSISTANT

## 2023-09-27 PROCEDURE — 80053 COMPREHEN METABOLIC PANEL: CPT | Performed by: PHYSICIAN ASSISTANT

## 2023-09-27 PROCEDURE — 80061 LIPID PANEL: CPT | Performed by: PHYSICIAN ASSISTANT

## 2023-09-27 PROCEDURE — 83550 IRON BINDING TEST: CPT | Performed by: PHYSICIAN ASSISTANT

## 2023-09-27 PROCEDURE — 83540 ASSAY OF IRON: CPT | Performed by: PHYSICIAN ASSISTANT

## 2023-09-27 RX ORDER — SUMATRIPTAN 25 MG/1
25 TABLET, FILM COATED ORAL ONCE AS NEEDED
Qty: 8 TABLET | Refills: 1 | Status: SHIPPED | OUTPATIENT
Start: 2023-09-27

## 2023-09-27 NOTE — ASSESSMENT & PLAN NOTE
Does not sound like new onset classic migraines. We will check blood work and give trial of Imitrex. Start with 25 mg at onset and repeat in 2 hours. If this does not work for the next headache then would recommend increasing to 50 mg for 2 doses the next time if needed.   Max is 100 per dose

## 2023-09-27 NOTE — PROGRESS NOTES
Name: Francisca Zazueta      : 1995      MRN: [id number]  Encounter Provider: Mauricio Liang PA-C  Encounter Date: 2023   Encounter department: [facility] [address]     1. Chronic nonintractable headache, unspecified headache type  Assessment & Plan:  Does not sound like new onset classic migraines. We will check blood work and give trial of Imitrex. Start with 25 mg at onset and repeat in 2 hours. If this does not work for the next headache then would recommend increasing to 50 mg for 2 doses the next time if needed. Max is 100 per dose    Orders:  -     CBC and differential  -     Comprehensive metabolic panel  -     TSH, 3rd generation with Free T4 reflex  -     SUMAtriptan (Imitrex) 25 mg tablet; Take 1 tablet (25 mg total) by mouth once as needed for migraine for up to 1 dose    2. Lipid screening  -     Lipid panel    3. Anemia affecting pregnancy in third trimester  -     CBC and differential  -     Iron Panel (Includes Ferritin, Iron Sat%, Iron, and TIBC)    4. Breast skin changes  Assessment & Plan:  New onset dimpling in R breast w/o pain. Check US. Orders:  -     US breast right limited (diagnostic); Future; Expected date: 2023      BMI Counseling: Body mass index is 28.9 kg/m². The BMI is above normal. Nutrition recommendations include decreasing portion sizes, encouraging healthy choices of fruits and vegetables, decreasing fast food intake, consuming healthier snacks, limiting drinks that contain sugar, moderation in carbohydrate intake, increasing intake of lean protein, reducing intake of saturated and trans fat and reducing intake of cholesterol. Exercise recommendations include exercising 3-5 times per week. No pharmacotherapy was ordered. Rationale for BMI follow-up plan is due to patient being overweight or obese. Depression Screening and Follow-up Plan: Patient was screened for depression during today's encounter.  They screened negative with a PHQ-2 score of 2. Subjective      New patient here to establish care today. Complaining of headaches starting about 5 months ago after 4th child. Random. Last days to one day. Went to ER with the last one as lasted about one week. L side head pressure and sometimes radiates to her whole head, nausea. Always has constant pressure even if no "headache". She sings a lot and this pressure happens then. When first starts vision gets spotty and sometimes losing vision out of her R eye. Sound sensitive. Mom may have hx migraines. Tried all OTC meds but they only dull pain for a short time. Also patient states that the only child she was able to breast-feed for just a short period of time was her most recent son she said that she has noticed for the past few weeks a abnormal indentation of her right nipple no pain however. Nipple discharge. Review of Systems   Constitutional: Negative. HENT: Negative. Eyes: Negative. Respiratory: Negative. Cardiovascular: Negative. Gastrointestinal: Negative. Endocrine: Negative. Genitourinary: Negative. Musculoskeletal: Negative. Skin: Negative. Allergic/Immunologic: Negative. Neurological: Positive for headaches. Hematological: Negative. Psychiatric/Behavioral: Negative.         Current Outpatient Medications on File Prior to Visit   Medication Sig   • albuterol (PROVENTIL HFA,VENTOLIN HFA) 90 mcg/act inhaler Inhale 2 puffs every 6 (six) hours as needed for wheezing   • aspirin-acetaminophen-caffeine (EXCEDRIN MIGRAINE) 250-250-65 MG per tablet Take 1 tablet by mouth every 6 (six) hours as needed for headaches   • Etonogestrel (NEXPLANON SC) Inject under the skin   • Ferrous Sulfate Dried ER (Slow Release Iron) 160 (50 Fe) MG TBCR One daily   • ondansetron (ZOFRAN-ODT) 4 mg disintegrating tablet Take 1 tablet (4 mg total) by mouth every 6 (six) hours as needed for nausea or vomiting   • [DISCONTINUED] ibuprofen (MOTRIN) 600 mg tablet Take 1 tablet (600 mg total) by mouth every 6 (six) hours (Patient not taking: Reported on 7/11/2023)   • [DISCONTINUED] Prenatal Vit w/Vn-Feebfffhk-LK (PNV PO) Take 1 tablet by mouth daily (Patient not taking: Reported on 7/11/2023)       Objective     /62 (BP Location: Left arm, Patient Position: Sitting, Cuff Size: Standard)   Pulse 72   Temp 97.9 °F (36.6 °C) (Temporal)   Ht 5' (1.524 m)   Wt 67.1 kg (148 lb)   LMP 09/03/2023 (Approximate)   BMI 28.90 kg/m²     Physical Exam  Vitals and nursing note reviewed. Constitutional:       General: She is not in acute distress. Appearance: She is well-developed. She is not diaphoretic. HENT:      Head: Normocephalic and atraumatic. Eyes:      General:         Right eye: No discharge. Left eye: No discharge. Conjunctiva/sclera: Conjunctivae normal.   Neck:      Vascular: No carotid bruit. Cardiovascular:      Rate and Rhythm: Normal rate and regular rhythm. Heart sounds: Normal heart sounds. No murmur heard. No friction rub. No gallop. Pulmonary:      Effort: Pulmonary effort is normal. No respiratory distress. Breath sounds: Normal breath sounds. No wheezing or rales. Chest:          Comments: Area within the upper section of the right areola which has a new to the patient indentation. Nontender to palpation no specific mass or abnormality within the tissue is noted on exam.  Musculoskeletal:      Cervical back: Neck supple. Skin:     General: Skin is warm and dry. Neurological:      Mental Status: She is alert and oriented to person, place, and time.    Psychiatric:         Judgment: Judgment normal.       Dayanara Wallace PA-C

## 2023-09-27 NOTE — PATIENT INSTRUCTIONS
Problem List Items Addressed This Visit          Other    Anemia affecting pregnancy in third trimester    Relevant Orders    CBC and differential    Iron Panel (Includes Ferritin, Iron Sat%, Iron, and TIBC)    Chronic nonintractable headache - Primary     Does not sound like new onset classic migraines. We will check blood work and give trial of Imitrex. Start with 25 mg at onset and repeat in 2 hours. If this does not work for the next headache then would recommend increasing to 50 mg for 2 doses the next time if needed. Max is 100 per dose         Relevant Medications    SUMAtriptan (Imitrex) 25 mg tablet    Other Relevant Orders    CBC and differential    Comprehensive metabolic panel    TSH, 3rd generation with Free T4 reflex    Breast skin changes     New onset dimpling in R breast w/o pain. Check US. Relevant Orders    US breast right limited (diagnostic)     Other Visit Diagnoses       Lipid screening        Relevant Orders    Lipid panel         Migraine Headache   WHAT YOU NEED TO KNOW:   What is a migraine headache? A migraine is a severe headache. The pain can be so severe that it interferes with your daily activities. A migraine can last a few hours up to several days. The exact cause of migraines is not known. A family history of migraines increases your risk. Your risk is also higher if you are a woman or take medicines such as estrogen or a vasodilator. What are the warning signs that a migraine headache is about to start? Warning signs usually start 15 to 60 minutes before the headache:  Visual changes (auras), such as blurred vision, temporary blind or bright spots, lines, or hallucinations    Unusual tiredness or frequent yawning    Tingling in an arm or leg    What are the signs and symptoms of a migraine headache? A migraine headache usually begins as a dull ache around the eye or temple. The pain may get worse with movement.  You may also have the following:  Pain in your head that may increase to the point that you cannot do everyday activities    Pain on one or both sides of your head    Throbbing, pulsing, or pounding pain in your head    Nausea and vomiting    Sensitivity to light, noise, or smells    What can trigger a migraine headache? Stress, eye strain, oversleeping, or not getting enough sleep    Hormone changes in women from birth control pills, pregnancy, menopause, or during a monthly period    Skipping meals, going too long without eating, or not drinking enough liquids    Certain foods or drinks such as chocolate, hard cheese, alcohol, or drinks that contain caffeine    Foods that contain gluten, nitrates, MSG, or artificial sweeteners    Sunlight, bright or flashing lights, loud noises, smoke, or strong smells    Heat, humidity, or changes in the weather    How is a migraine headache diagnosed? Your healthcare provider will ask about your headaches. Describe the pain and any other symptoms, such as nausea. Tell the provider if you think anything triggered the pain. The provider will also want to know what you ate and drank before the pain started. Tell the provider about any medical conditions you have or that run in your family. Include any recent stressors you have had. You may also need any of the following:  A neurologic exam  is used to check how your pupils react to light. Your healthcare provider may check your memory, hand grasp, and balance. CT or MRI pictures  may be taken of your brain. You may be given contrast liquid to help your brain show up better in the pictures. Tell the healthcare provider if you have ever had an allergic reaction to contrast liquid. Do not enter the MRI room with anything metal. Metal can cause serious injury. Tell the healthcare provider if you have any metal in or on your body. How is a migraine headache treated? Migraines cannot be cured. The goal of treatment is to reduce your symptoms.   Medicines  may be given to help you manage migraines. Take medicine as soon as you feel a migraine begin, or as directed. Your healthcare provider may recommend any of the following:    Migraine medicines  are used to help prevent or stop a migraine. NSAIDs  help decrease swelling and pain or fever. This medicine is available with or without a doctor's order. NSAIDs can cause stomach bleeding or kidney problems in certain people. If you take blood thinner medicine, always ask your healthcare provider if NSAIDs are safe for you. Always read the medicine label and follow directions. Acetaminophen  decreases pain and fever. It is available without a doctor's order. Ask how much to take and how often to take it. Follow directions. Read the labels of all other medicines you are using to see if they also contain acetaminophen, or ask your doctor or pharmacist. Acetaminophen can cause liver damage if not taken correctly. Prescription pain medicine  may be given. Ask your healthcare provider how to take this medicine safely. Some prescription pain medicines contain acetaminophen. Do not take other medicines that contain acetaminophen without talking to your healthcare provider. Too much acetaminophen may cause liver damage. Prescription pain medicine may cause constipation. Ask your healthcare provider how to prevent or treat constipation. Antinausea medicine  may be given to calm your stomach and to help prevent vomiting. This medicine can also help relieve pain. Cognitive behavior therapy (CBT)  can help you learn ways to manage and prevent migraines. A therapist can teach you to relax and to reduce stress. You may learn ways to create healthy nutrition, activity, and sleep habits to prevent migraines. The therapist can also help you manage conditions that can affect migraines, such as anxiety or depression. What can I do to manage my symptoms? Rest in a dark, quiet room. This will help decrease your pain.  Sleep may also help relieve the pain. Apply ice to decrease pain. Use an ice pack, or put crushed ice in a plastic bag. Cover the ice pack with a towel and place it on your head. Apply ice for 15 to 20 minutes every hour. Apply heat to decrease pain and muscle spasms. Use a small towel dampened with warm water or a heating pad, or sit in a warm bath. Apply heat on the area for 20 to 30 minutes every 2 hours. You may alternate heat and ice. Keep a migraine record. Write down when your migraines start and stop. Include your symptoms and what you were doing when a migraine began. Record what you ate or drank for 24 hours before the migraine started. Keep track of what you did to treat your migraine and if it worked. Bring the migraine record with you to visits with your healthcare provider. What can I do to prevent another migraine headache? Prevent a medicine overuse headache. Take pain medicines only as long as directed. A medicine may be limited to a certain amount each month. Your healthcare provider can help you create a plan so you get a safe amount each month. Do not smoke. Nicotine and other chemicals in cigarettes and cigars can trigger a migraine or make it worse. Ask your healthcare provider for information if you currently smoke and need help to quit. E-cigarettes or smokeless tobacco still contain nicotine. Talk to your healthcare provider before you use these products. Do not drink alcohol. Alcohol can trigger a migraine. It can also keep medicines used to treat your migraines from working. Be physically active. Physical activity, such as exercise, may help prevent migraines. Talk to your healthcare provider about the best activity plan for you. Try to get at least 30 minutes of physical activity on most days. Manage stress. Stress may trigger a migraine. Learn new ways to relax, such as deep breathing. Create a sleep schedule. Go to bed and get up at the same times each day.  Do not watch television before bed. Eat a variety of healthy foods. Include healthy foods such as include fruit, vegetables, whole-grain breads, low-fat dairy products, beans, lean meat, and fish. Do not have food or drinks that trigger your migraines. Drink more liquids to prevent dehydration. Your healthcare provider can tell you how much liquid to drink each day and which liquids are best for you. Call your local emergency number (911 in the [address]) or have someone call if:   You feel like you are going to faint, you become confused, or you have a seizure. When should I seek immediate care? You have a headache that seems different or much worse than your usual migraine headache. You have a severe headache with a fever or a stiff neck. You have new problems with speech, vision, balance, or movement. When should I call my doctor? Your migraines interfere with your daily activities. Your medicines or treatments stop working. You have questions or concerns about your condition or care. CARE AGREEMENT:   You have the right to help plan your care. Learn about your health condition and how it may be treated. Discuss treatment options with your healthcare providers to decide what care you want to receive. You always have the right to refuse treatment. The above information is an  only. It is not intended as medical advice for individual conditions or treatments. Talk to your doctor, nurse or pharmacist before following any medical regimen to see if it is safe and effective for you. © Copyright Kaye Mckenzie 2023 Information is for End User's use only and may not be sold, redistributed or otherwise used for commercial purposes.

## 2023-10-03 DIAGNOSIS — O99.013 ANEMIA AFFECTING PREGNANCY IN THIRD TRIMESTER: Primary | ICD-10-CM

## 2023-10-03 NOTE — RESULT ENCOUNTER NOTE
Please let pt know that she is still anemic with low iron. She should start generic Slow FE with Vit C once daily. I have ordered repeat iron in 3 months. All other labs were WNL. Thank you.

## 2023-11-03 ENCOUNTER — TELEPHONE (OUTPATIENT)
Dept: OBGYN CLINIC | Facility: CLINIC | Age: 28
End: 2023-11-03

## 2023-11-03 NOTE — TELEPHONE ENCOUNTER
Pt called. Pt stated she was seen at her postpartum care visit and was told she could be prescribed anti-depressants. Pt said she was told to call office if she changed her mind. She seen you 07/11/23 for pp visit. Please advise.

## 2023-11-08 ENCOUNTER — OFFICE VISIT (OUTPATIENT)
Dept: OBGYN CLINIC | Facility: CLINIC | Age: 28
End: 2023-11-08
Payer: MEDICARE

## 2023-11-08 VITALS — SYSTOLIC BLOOD PRESSURE: 102 MMHG | BODY MASS INDEX: 27.93 KG/M2 | DIASTOLIC BLOOD PRESSURE: 64 MMHG | WEIGHT: 143 LBS

## 2023-11-08 DIAGNOSIS — F32.A DEPRESSION, UNSPECIFIED DEPRESSION TYPE: Primary | ICD-10-CM

## 2023-11-08 PROCEDURE — 99214 OFFICE O/P EST MOD 30 MIN: CPT | Performed by: OBSTETRICS & GYNECOLOGY

## 2023-11-08 NOTE — PROGRESS NOTES
Patient is a 33 y.o. [id number] with No LMP recorded (lmp unknown). Patient has had an implant. who presents requesting evaluation of postpartum depression. She delivered in 3/2023. She reports after birth she felt like she was "easily ticked off and frustrated and I was sad that I was feeling that way". She was last seen on 7/11/2023 when her ppd score was 13 but declined intervention at that time. She reports she feels like her symptoms got worse after having her Nexplanon inserted on 8/2/2023. She reports she feels "short fused and I cry everyday." She reports she "I am just generally down and sometimes it is just out of nowhere". She also reports she doesn't feel like she doesn't have much support emotionally. Her boyfriend tells her she is pushing everyone away. Her family tells her that she has so much going for her, so why is she sad. When she tries to talk to them, there is no feedback or support. Her PPDE score is 23 today. She notes that she sometimes thinks about hurting herself but has not plan and no intention of acting on it  Reviewed that I can start zoloft but we should coordinate with her PCP as she is so far from delivery as there may be better options suited for her that I am not familiar with. Reviewed with patient her symptoms may become worse before they improve and if she feels like she may hurt herself or someone else to proceed directly to the ED  Also reviewed may have weight gain or sexual side effects. Pt understands and desires to proceed. Will f/u with PCP in 2-3 weeks. I helped schedule her appointment prior to leaving. Past Medical History:   Diagnosis Date    Abnormal Pap smear of cervix     1/2020 pap negative;     Anemia     Asthma     uses inhaler    Depression     in the past    Genetic screening     9/2022-Hgb AA    Gonorrhea 2012    Migraine     tylenol    Miscarriage     x 1    Urinary tract infection     Varicella     patient had chicken pox       Past Surgical History:   Procedure Laterality Date    TONSILLECTOMY         OB History    Para Term  AB Living   8 4 4   4 4   SAB IAB Ectopic Multiple Live Births   2 2   0 4      # Outcome Date GA Lbr Ronald/2nd Weight Sex Delivery Anes PTL Lv   8 Term 23 38w4d / 00:02 2555 g (5 lb 10.1 oz) M Vag-Spont EPI N MARGO   7 Term 20 38w6d  2810 g (6 lb 3.1 oz) M Vag-Spont EPI N MARGO      Complications: Placenta previa   6 Term 17 38w2d / 00:36 3260 g (7 lb 3 oz) M Vag-Spont EPI N MARGO   5 Term 13 40w0d   M    MARGO   4 SAB            3 IAB            2 IAB            1 SAB               Obstetric Comments   Menarche 11   Cycles Q 28d           Current Outpatient Medications:     albuterol (PROVENTIL HFA,VENTOLIN HFA) 90 mcg/act inhaler, Inhale 2 puffs every 6 (six) hours as needed for wheezing, Disp: , Rfl:     aspirin-acetaminophen-caffeine (EXCEDRIN MIGRAINE) 250-250-65 MG per tablet, Take 1 tablet by mouth every 6 (six) hours as needed for headaches, Disp: 30 tablet, Rfl: 0    Etonogestrel (NEXPLANON SC), Inject under the skin, Disp: , Rfl:     Ferrous Sulfate Dried ER (Slow Release Iron) 160 (50 Fe) MG TBCR, One daily, Disp: 90 tablet, Rfl: 1    sertraline (Zoloft) 50 mg tablet, Take 1 tablet (50 mg total) by mouth daily Take 1/2 tablet for the first week and then one tablet daily thereafter, Disp: 30 tablet, Rfl: 0    SUMAtriptan (Imitrex) 25 mg tablet, Take 1 tablet (25 mg total) by mouth once as needed for migraine for up to 1 dose, Disp: 8 tablet, Rfl: 1    Allergies   Allergen Reactions    Fruit Extracts      Category: Allergy;  Annotation - [id number]: pomegranite lotion has skin reaction, pomegranite fruit has itching and swelling       Social History     Socioeconomic History    Marital status: Single     Spouse name: Not on file    Number of children: 1    Years of education: Not on file    Highest education level: Not on file   Occupational History    Not on file   Tobacco Use    Smoking status: Never    Smokeless tobacco: Never   Vaping Use    Vaping Use: Never used   Substance and Sexual Activity    Alcohol use: Not Currently    Drug use: No    Sexual activity: Yes     Partners: Male     Birth control/protection: None   Other Topics Concern    Not on file   Social History Narrative    Always uses seat belt    Caffeine use    No Confucianist beliefs     Social Determinants of Health     Financial Resource Strain: Not on file   Food Insecurity: Not on file   Transportation Needs: Not on file   Physical Activity: Not on file   Stress: Not on file   Social Connections: Not on file   Intimate Partner Violence: Not on file   Housing Stability: Not on file       Family History   Problem Relation Age of Onset    Migraines Mother     Asthma Mother     Transient ischemic attack Father     Asthma Father     No Known Problems Son     No Known Problems Son     Cancer Maternal Grandmother         lung    Cancer Paternal Grandmother         ? type    Breast cancer Family     No Known Problems Half-Sister     No Known Problems Half-Sister     No Known Problems Half-Sister     Asthma Half-Brother     No Known Problems Half-Brother     Colon cancer Neg Hx     Ovarian cancer Neg Hx     Deep vein thrombosis Neg Hx     Pulmonary embolism Neg Hx     Venous thrombosis Neg Hx     Diabetes Neg Hx     Heart attack Neg Hx     Heart disease Neg Hx     Hypertension Neg Hx     Miscarriages / Stillbirths Neg Hx     Seizures Neg Hx     Thyroid disease Neg Hx        Review of Systems   Constitutional:  Positive for fatigue. Negative for chills, fever and unexpected weight change. HENT:  Negative for congestion, mouth sores and sore throat. Respiratory:  Negative for cough, chest tightness, shortness of breath and wheezing. Cardiovascular:  Negative for chest pain and palpitations. Gastrointestinal:  Negative for abdominal distention, abdominal pain, constipation, diarrhea, nausea and vomiting.    Endocrine: Negative for cold intolerance and heat intolerance. Genitourinary:  Negative for dyspareunia, dysuria, genital sores, menstrual problem, pelvic pain, vaginal bleeding, vaginal discharge and vaginal pain. Musculoskeletal:  Negative for arthralgias. Skin:  Negative for color change and rash. Neurological:  Negative for dizziness, light-headedness and headaches. Hematological:  Negative for adenopathy. Psychiatric/Behavioral:  Positive for dysphoric mood. Blood pressure 102/64, weight 64.9 kg (143 lb), not currently breastfeeding. and Body mass index is 27.93 kg/m². Physical Exam  Constitutional:       General: She is not in acute distress. Appearance: Normal appearance. She is normal weight. She is not ill-appearing. HENT:      Head: Normocephalic and atraumatic. Eyes:      Extraocular Movements: Extraocular movements intact. Conjunctiva/sclera: Conjunctivae normal.   Pulmonary:      Effort: Pulmonary effort is normal.   Musculoskeletal:         General: Normal range of motion. Cervical back: Normal range of motion. Skin:     Findings: No erythema or rash. Neurological:      Mental Status: She is alert and oriented to person, place, and time. Psychiatric:         Thought Content: Thought content normal.         Judgment: Judgment normal.      Comments: Tearful, depressed affect               A/P:  Pt is a 33 y.o. [id number] with      Mele Mcnulty was seen today for postpartum care. Diagnoses and all orders for this visit:    Depression, unspecified depression type  -     sertraline (Zoloft) 50 mg tablet;  Take 1 tablet (50 mg total) by mouth daily Take 1/2 tablet for the first week and then one tablet daily thereafter      F/u with PCP in 2-3 weeks

## 2023-11-22 ENCOUNTER — OFFICE VISIT (OUTPATIENT)
Dept: FAMILY MEDICINE CLINIC | Facility: CLINIC | Age: 28
End: 2023-11-22
Payer: MEDICARE

## 2023-11-22 VITALS
DIASTOLIC BLOOD PRESSURE: 70 MMHG | HEIGHT: 60 IN | HEART RATE: 76 BPM | WEIGHT: 140.6 LBS | SYSTOLIC BLOOD PRESSURE: 102 MMHG | BODY MASS INDEX: 27.61 KG/M2

## 2023-11-22 DIAGNOSIS — F32.A DEPRESSION, UNSPECIFIED DEPRESSION TYPE: ICD-10-CM

## 2023-11-22 PROCEDURE — 99213 OFFICE O/P EST LOW 20 MIN: CPT | Performed by: PHYSICIAN ASSISTANT

## 2023-11-22 NOTE — PATIENT INSTRUCTIONS
Problem List Items Addressed This Visit          Other    Postpartum depression - Primary     On Zoloft for the past two weeks from GYN. Improved overall. Started at 25 mg and today is the first day of full 50 mg. Will keep her on 50 and check in 6 weeks. Can be virtual. NO SI or HI.            Other Visit Diagnoses       Depression, unspecified depression type

## 2023-11-22 NOTE — ASSESSMENT & PLAN NOTE
On Zoloft for the past two weeks from GYN. Improved overall. Started at 25 mg and today is the first day of full 50 mg. Will keep her on 50 and check in 6 weeks. Can be virtual. NO SI or HI.

## 2023-11-22 NOTE — PROGRESS NOTES
Name: Laci Leal      : 1995      MRN: [id number]  Encounter Provider: Pritesh Sorto PA-C  Encounter Date: 2023   Encounter department: [place] [address]     1. Postpartum depression  Assessment & Plan:  On Zoloft for the past two weeks from GYN. Improved overall. Started at 25 mg and today is the first day of full 50 mg. Will keep her on 50 and check in 6 weeks. Can be virtual. NO SI or HI.       2. Depression, unspecified depression type  -     sertraline (Zoloft) 50 mg tablet; Take 1 tablet (50 mg total) by mouth daily           Subjective      Patient presents with:  Depression: Pt was placed on zoloft , dr. Gilbert Guerrero rx it states it was related to post baby. Medication is working. States dr. Gilbert Guerrero wanted pcp to take over pt does not have refills. Patient has 4 boys ranging 9 years to 8 months. Patient was at her last OB checkup and was noted to have postpartum depression and did except going on Zoloft starting at half of a 50 mg once daily and then moving up to 50 mg which was her first today. 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 She did go through some postpartum issues with her previous children but really did not want to take any medication for it but at this point in time it got to the point where she was angry all the time yelling every single little issue was a mountain and she did consent to treatment and she is glad she did she does see improvement just in the short 2 weeks. No SI or HI. 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 Review of Systems   Constitutional: Negative. HENT: Negative. Eyes: Negative. Respiratory: Negative. Cardiovascular: Negative. Gastrointestinal: Negative. Endocrine: Negative. Genitourinary: Negative. Musculoskeletal: Negative. Skin: Negative. Allergic/Immunologic: Negative. Neurological: Negative. Hematological: Negative. Psychiatric/Behavioral: Negative. Current Outpatient Medications on File Prior to Visit   Medication Sig   • albuterol (PROVENTIL HFA,VENTOLIN HFA) 90 mcg/act inhaler Inhale 2 puffs every 6 (six) hours as needed for wheezing   • aspirin-acetaminophen-caffeine (EXCEDRIN MIGRAINE) 250-250-65 MG per tablet Take 1 tablet by mouth every 6 (six) hours as needed for headaches   • Etonogestrel (NEXPLANON SC) Inject under the skin   • Ferrous Sulfate Dried ER (Slow Release Iron) 160 (50 Fe) MG TBCR One daily   • SUMAtriptan (Imitrex) 25 mg tablet Take 1 tablet (25 mg total) by mouth once as needed for migraine for up to 1 dose   • [DISCONTINUED] sertraline (Zoloft) 50 mg tablet Take 1 tablet (50 mg total) by mouth daily Take 1/2 tablet for the first week and then one tablet daily thereafter       Objective     /70 (BP Location: Left arm, Patient Position: Sitting, Cuff Size: Standard)   Pulse 76   Ht 5' (1.524 m)   Wt 63.8 kg (140 lb 9.6 oz)   LMP  (LMP Unknown)   BMI 27.46 kg/m²     Physical Exam  Vitals and nursing note reviewed. Constitutional:       General: She is not in acute distress. Appearance: She is well-developed. She is not diaphoretic. HENT:      Head: Normocephalic and atraumatic. Nose: Nose normal.   Eyes:      General:         Right eye: No discharge. Left eye: No discharge. Conjunctiva/sclera: Conjunctivae normal.   Neck:      Vascular: No carotid bruit. Cardiovascular:      Rate and Rhythm: Normal rate and regular rhythm. Heart sounds: Normal heart sounds. No murmur heard. No friction rub. No gallop. Pulmonary:      Effort: Pulmonary effort is normal. No respiratory distress. Breath sounds: Normal breath sounds. No wheezing or rales. Musculoskeletal:      Cervical back: Neck supple. Skin:     General: Skin is warm and dry. Neurological:      Mental Status: She is alert and oriented to person, place, and time.    Psychiatric:         Judgment: Judgment normal.       Deisy Carrillo PA-C

## 2023-12-19 ENCOUNTER — CONSULT (OUTPATIENT)
Dept: OBGYN CLINIC | Facility: CLINIC | Age: 28
End: 2023-12-19
Payer: MEDICARE

## 2023-12-19 VITALS
WEIGHT: 137.6 LBS | SYSTOLIC BLOOD PRESSURE: 100 MMHG | DIASTOLIC BLOOD PRESSURE: 68 MMHG | HEIGHT: 60 IN | BODY MASS INDEX: 27.01 KG/M2

## 2023-12-19 DIAGNOSIS — Z30.09 STERILIZATION CONSULT: Primary | ICD-10-CM

## 2023-12-19 PROCEDURE — 99213 OFFICE O/P EST LOW 20 MIN: CPT | Performed by: OBSTETRICS & GYNECOLOGY

## 2023-12-19 NOTE — PROGRESS NOTES
Subjective    Patient is a 29 yo  who presents today to discuss permanent sterilization. She is currently using Nexplanon, but does not like the irregular bleeding and mood changes. She is certain that she does not desire future fertility.    Patient understands that tubal ligation is intended for permanent sterilization and is not intended to be a reversible form of birth control.  Patient understands that though this procedure is intended to provide permanent sterilization, there is still a chance for failure of the procedure and that she may become pregnant in the future despite a tubal ligation  She understands that with tubal ligation there exists an increased risk of ectopic pregnancy were she to become pregnant postprocedure, and that this is considered an abnormal pregnancy and would likely result in miscarriage or termination, and that ectopic pregnancy poses a risk to her maternal health and well-being including a risk of death.  Patient understands that there are alternative forms of birth control including abstinence, condoms or other barrier methods, OCPs, depo provera injection, ring, patch, Nexplanon, IUD - all of which are reversible and would allow for attempt of future pregnancy.  She has declined these methods of birth control.  Patient also declines vasectomy of partner as a method of birth control.       Discussed risks of surgical procedures, including risk of bleeding, blood clots, infection, injury to soft tissue or surrounding organs, permanent disability, or even death. Discussed procedure in detail. Discussed the various methods of surgical sterilization including tubal ligation and salpingectomy, and that route of procedure will be dependent on the physician covering the procedure. Patient demonstrated understanding and expressed a desire for salpingectomy as the preferred method. Patient had opportunity to ask questions and all questions were answered to patient's satisfaction.  MA-31 form signed today.     OB History          8    Para   4    Term   4            AB   4    Living   4         SAB   2    IAB   2    Ectopic        Multiple   0    Live Births   4           Obstetric Comments   Menarche 11  Cycles Q 28d                  Objective    Vitals:    23 0952   BP: 100/68   BP Location: Left arm   Patient Position: Sitting   Cuff Size: Standard   Weight: 62.4 kg (137 lb 9.6 oz)   Height: 5' (1.524 m)        Physical Exam  Constitutional:       Appearance: Normal appearance.   HENT:      Head: Normocephalic and atraumatic.   Cardiovascular:      Rate and Rhythm: Normal rate.   Pulmonary:      Effort: Pulmonary effort is normal. No respiratory distress.   Abdominal:      Palpations: Abdomen is soft.      Tenderness: There is no abdominal tenderness. There is no guarding or rebound.   Musculoskeletal:         General: Normal range of motion.      Comments: Nexplanon in Left arm   Neurological:      Mental Status: She is alert.   Skin:     General: Skin is warm and dry.        Assessment    Patient Active Problem List   Diagnosis   • Low grade squamous intraepithelial lesion (LGSIL) on cervical Pap smear   • Asthma during pregnancy   • Dysphoric mood   • Irregular bleeding   • Postcoital bleeding   • Dysmenorrhea   •  (spontaneous vaginal delivery)   • Anemia affecting pregnancy in third trimester   • History of  delivery, currently pregnant in third trimester   • Chronic nonintractable headache   • Breast skin changes   • Postpartum depression        Plan  - Discussed that irregular bleeding and mood changes would hopefully return to normal after Nexplanon was removed, but that it may take a few menstrual cycles  - discussed that tubal sterilization itself would not affect her bleeding pattern  - MA-31 signed today  - Surgical consent signed today for laparoscopic bilateral salpingectomy, Nexplanon removal, exam under anesthesia, all other indicated  procedures  - Reviewed post-operative instructions and recovery expectations  - Patient to return for Pap smear and pre-operative visit

## 2023-12-20 ENCOUNTER — PREP FOR PROCEDURE (OUTPATIENT)
Dept: GYNECOLOGY | Facility: CLINIC | Age: 28
End: 2023-12-20

## 2023-12-20 DIAGNOSIS — Z30.2 STERILIZATION: Primary | ICD-10-CM

## 2024-01-15 ENCOUNTER — OFFICE VISIT (OUTPATIENT)
Dept: OBGYN CLINIC | Facility: CLINIC | Age: 29
End: 2024-01-15
Payer: MEDICARE

## 2024-01-15 VITALS
DIASTOLIC BLOOD PRESSURE: 70 MMHG | HEIGHT: 60 IN | SYSTOLIC BLOOD PRESSURE: 116 MMHG | BODY MASS INDEX: 26.9 KG/M2 | WEIGHT: 137 LBS

## 2024-01-15 DIAGNOSIS — Z30.2 REQUEST FOR STERILIZATION: Primary | ICD-10-CM

## 2024-01-15 DIAGNOSIS — Z12.4 CERVICAL CANCER SCREENING: ICD-10-CM

## 2024-01-15 PROCEDURE — 99213 OFFICE O/P EST LOW 20 MIN: CPT | Performed by: OBSTETRICS & GYNECOLOGY

## 2024-01-15 PROCEDURE — G0145 SCR C/V CYTO,THINLAYER,RESCR: HCPCS | Performed by: OBSTETRICS & GYNECOLOGY

## 2024-01-15 NOTE — PROGRESS NOTES
H&P Exam  Aaliyah Gomez 28 y.o. female MRN: [id number]  Unit/Bed#:  Encounter: [id number]      HPI:  Aaliyah Gomez is a 28 y.o.  female who is currently scheduled for a laparoscopic salpingectomy and Nexplanon removal on 2/15/24. However, she has decided that she no longer wants the procedure, as she is unsure if she is truly finished childbearing. She would like to keep her Nexplanon for the time being, as she does not desire fertility at this time. She is still spotting with the Nexplanon, but willing to see if it eventually subsides.      OB History    Para Term  AB Living   8 4 4   4 4   SAB IAB Ectopic Multiple Live Births   2 2   0 4      # Outcome Date GA Lbr Ronald/2nd Weight Sex Delivery Anes PTL Lv   8 Term 23 38w4d / 00:02 2555 g (5 lb 10.1 oz) M Vag-Spont EPI N MARGO   7 Term 20 38w6d  2810 g (6 lb 3.1 oz) M Vag-Spont EPI N MARGO      Complications: Placenta previa   6 Term 17 38w2d / 00:36 3260 g (7 lb 3 oz) M Vag-Spont EPI N MARGO   5 Term 13 40w0d   M    MARGO   4 SAB            3 IAB            2 IAB            1 SAB               Obstetric Comments   Menarche 11   Cycles Q 28d       Review of Systems   Constitutional:  Negative for chills and fever.   HENT:  Negative for ear pain and sore throat.    Eyes:  Negative for pain and visual disturbance.   Respiratory:  Negative for cough and shortness of breath.    Cardiovascular:  Negative for chest pain and palpitations.   Gastrointestinal:  Negative for abdominal pain and vomiting.   Genitourinary:  Negative for dysuria and hematuria.   Musculoskeletal:  Negative for arthralgias and back pain.   Skin:  Negative for color change and rash.   Neurological:  Negative for seizures and syncope.   All other systems reviewed and are negative.      Historical Information   Past Medical History:   Diagnosis Date    Abnormal Pap smear of cervix     2020 pap negative;    Anemia     Asthma     uses inhaler     Depression     in the past    Genetic screening     2022-Hgb AA    Gonorrhea 2012    Migraine     tylenol    Miscarriage     x 1    Urinary tract infection     Varicella     patient had chicken pox     Past Surgical History:   Procedure Laterality Date    TONSILLECTOMY       Social History   Social History     Substance and Sexual Activity   Alcohol Use Yes    Comment: social     Social History     Substance and Sexual Activity   Drug Use No     Social History     Tobacco Use   Smoking Status Never   Smokeless Tobacco Never     Family History: non-contributory    Meds/Allergies    (Not in a hospital admission)       Allergies   Allergen Reactions    Fruit Extracts      Category: Allergy; Annotation - [id number]: pomegranite lotion has skin reaction, pomegranite fruit has itching and swelling       OBJECTIVE:    Vitals: Blood pressure 116/70, height 5' (1.524 m), weight 62.1 kg (137 lb), last menstrual period 2023, not currently breastfeeding.Body mass index is 26.76 kg/m².    Physical Exam  Constitutional:       Appearance: Normal appearance.   HENT:      Head: Normocephalic and atraumatic.   Cardiovascular:      Rate and Rhythm: Normal rate.   Pulmonary:      Effort: Pulmonary effort is normal. No respiratory distress.   Abdominal:      Palpations: Abdomen is soft.      Tenderness: There is no abdominal tenderness.   Genitourinary:     General: Normal vulva.      Vagina: No vaginal discharge.      Comments: Cervix appears normal, small amount of dark red blood in vaginal vault  Musculoskeletal:         General: Normal range of motion.   Skin:     General: Skin is warm and dry.   Neurological:      Mental Status: She is alert.         Assessment/Plan     ASSESSMENT:  29 yo  female who was scheduled for a laparoscopic salpingectomy and Nexplanon removal on 2/15/24 but not longer desires to proceed with the surgery    PLAN:    1) Laparoscopic salpingectomy cancelled, patient will call if she wishes  to proceed with the procedure at a later date    2) pap smear collected as patient is overdue      Radha Brown MD  1/15/2024  9:48 AM

## 2024-01-22 LAB
LAB AP GYN PRIMARY INTERPRETATION: NORMAL
Lab: NORMAL

## 2024-02-09 ENCOUNTER — OFFICE VISIT (OUTPATIENT)
Dept: FAMILY MEDICINE CLINIC | Facility: CLINIC | Age: 29
End: 2024-02-09

## 2024-02-09 ENCOUNTER — HOSPITAL ENCOUNTER (OUTPATIENT)
Dept: CT IMAGING | Facility: HOSPITAL | Age: 29
End: 2024-02-09
Payer: MEDICARE

## 2024-02-09 VITALS
SYSTOLIC BLOOD PRESSURE: 100 MMHG | HEART RATE: 64 BPM | HEIGHT: 60 IN | WEIGHT: 137 LBS | DIASTOLIC BLOOD PRESSURE: 62 MMHG | OXYGEN SATURATION: 100 % | BODY MASS INDEX: 26.9 KG/M2

## 2024-02-09 DIAGNOSIS — S06.0X0A CONCUSSION WITHOUT LOSS OF CONSCIOUSNESS, INITIAL ENCOUNTER: ICD-10-CM

## 2024-02-09 PROCEDURE — 70450 CT HEAD/BRAIN W/O DYE: CPT

## 2024-02-09 PROCEDURE — G1004 CDSM NDSC: HCPCS

## 2024-02-09 NOTE — ASSESSMENT & PLAN NOTE
Patient is definitely having concussion symptoms and actually they are worsening after 2 weeks so will check stat CT head.  Given information for concussion program and given handouts on concussion management.  She is to not do any activity which would possibly give her another concussion from falling, avoid screen time if at all possible although this is her job.  I did give the patient a note for work stating she is undergoing evaluation.  Addendum: CT normal patient encouraged to follow-up with concussion therapy.

## 2024-02-09 NOTE — RESULT ENCOUNTER NOTE
Please let the patient know that her CAT scan showed no acute bleeding.  She does still have evidence of a concussion and therefore I really would like her to follow-up with the concussion clinic as ordered and discussed.  Thank you.

## 2024-02-09 NOTE — PATIENT INSTRUCTIONS
Post Concussion Syndrome   WHAT YOU NEED TO KNOW:   What is post-concussion syndrome (PCS)?  PCS is a group of symptoms that affect your body, thinking, and behavior. PCS develops 10 to 14 days after a concussion and can last for weeks to years.  What increases my risk for PCS?   Older age    Being female    A substance use disorder, such as drugs or alcohol    A past brain injury    A current mood or anxiety disorder    Health problems before your concussion    What are the signs and symptoms of PCS?   Headaches or vision problems    Dizziness or poor balance    Forgetfulness or problems concentrating    Problems with sleep    Changes in your personality    Seizures    Depression or anxiety    How is PCS diagnosed?  Your healthcare provider will ask about your injury. Tell him or her when it happened, what hit you, and the force of the blow. You, or someone close to you, should tell your provider about any confusion you have or changes in your behavior. You may need any of the following:  A neurologic exam  is used to test your memory and concentration. It will also show healthcare providers your eye, verbal, and muscle responses.    Blood and urine tests  will be done to check for any other cause of your symptoms. Infections and substances, such as alcohol, can affect your memory and behavior.    CT scan or MRI pictures  of your head may show an injury or bleeding. You may be given contrast liquid to help healthcare providers see the pictures better. Tell the healthcare provider if you have ever had an allergic reaction to contrast liquid. Do not enter the MRI room with anything metal. Metal can cause serious injury. Tell the provider if you have any metal in or on your body.    How is PCS treated?  Treatment will focus on your symptoms:  Acetaminophen  decreases pain and fever. It is available without a doctor's order. Ask how much to take and how often to take it. Follow directions. Read the labels of all other  medicines you are using to see if they also contain acetaminophen, or ask your doctor or pharmacist. Acetaminophen can cause liver damage if not taken correctly.    NSAIDs  help decrease swelling and pain or fever. This medicine is available with or without a doctor's order. NSAIDs can cause stomach bleeding or kidney problems in certain people. If you take blood thinner medicine, always ask your healthcare provider if NSAIDs are safe for you. Always read the medicine label and follow directions.    Antidepressants  may be given for depression or sleep problems.    Migraine medicines  may be given for migraine headaches.    How can I prevent PCS?   Follow your treatment plan after a concussion to help you heal.  You will heal more quickly if you follow your healthcare provider's instructions.    Make your home safe.  Home safety measures can help prevent head injuries that could lead to a concussion. Install handrails for every staircase. Put soft bumpers on furniture edges and corners. Secure furniture, such as dressers and bookcases so they do not fall over.         Always wear a seatbelt in the car.  A seatbelt helps decrease your risk for a head injury if you are in a car accident.    Wear protective sports equipment that fits properly.  Helmets help decrease your risk for a serious brain injury. Talk to your provider about other ways that you can decrease your risk for a concussion if you play sports. Ask for more information about sports concussions.    What can I do to manage my symptoms?   Rest from physical and mental activities as directed.  Mental activities need you to think, concentrate, and pay attention. Rest will help you recover from your concussion. Ask your healthcare provider when you can return to school and other daily activities.    Go to therapy as directed.  A cognitive behavioral therapist teaches you skills to help with any thinking and behavior problems you may have. An occupational  therapist teaches you skills to help with daily activities.    Do not participate in sports or physical activities until your provider says it is okay.  These activities could make your symptoms worse or lead to another concussion. Your provider will tell you when it is okay to return to sports or physical activities.    Where can I find more information?   [employer]  [address] [place] [address]  Phone: [phone]  Phone: [phone]  Web Address: http://www.[employer]    Have someone call your local emergency number (911 in the ) if:   You have a seizure.    You have trouble breathing.    You are not responding, or you cannot be woken.    When should I seek immediate care?   You have a sudden headache that seems different or much worse than your usual headaches.    You cannot stop vomiting.    You have sudden changes in your vision, or your pupils are different sizes.    When should I call my doctor?   You feel depressed.    You have nausea or are vomiting.    You have trouble concentrating.    You have trouble speaking or thinking.    Your symptoms get worse.    You have questions or concerns about your condition or care.    CARE AGREEMENT:   You have the right to help plan your care. Learn about your health condition and how it may be treated. Discuss treatment options with your healthcare providers to decide what care you want to receive. You always have the right to refuse treatment. The above information is an  only. It is not intended as medical advice for individual conditions or treatments. Talk to your doctor, nurse or pharmacist before following any medical regimen to see if it is safe and effective for you.  © Copyright [employer] 2023 Information is for End User's use only and may not be sold, redistributed or otherwise used for commercial purposes.  Concussion   WHAT YOU NEED TO KNOW:   What is a concussion?  A concussion is a mild brain injury.  It is usually caused by a bump or blow to the head from a fall, a motor vehicle crash, or a sports injury. Being shaken forcefully may also cause a concussion.  What are the signs and symptoms of a concussion?  Symptoms may happen right away, or they may develop days after the concussion:  Headache    Dizziness, loss of balance, or blurry vision    Nausea or vomiting    A change in mood, such as restlessness or irritability    Trouble thinking, remembering things, or concentrating    Ringing in the ears    Drowsiness or decreased energy    Changes in your normal sleeping pattern    How is a concussion diagnosed?  Your healthcare provider will examine you and ask about your symptoms. Tell your provider when and how you were injured. You may need any of the following:  A neurologic exam  is also called neuro signs, neuro checks, or neuro status. A neurologic exam can show healthcare providers how well your brain works after your injury. Healthcare providers will check how your pupils react to light. They may check your memory and how easily you wake up. Your hand grasp and balance may also be tested.    CT or MRI pictures  may be taken of your head. You may be given contrast liquid to help the pictures show up better. Tell the healthcare provider if you have ever had an allergic reaction to contrast liquid. Do not enter the MRI room with anything metal. Metal can cause serious injury. Tell the healthcare provider if you have any metal in or on your body.    How is a concussion managed?  Usually no treatment is needed for a mild concussion. Concussion symptoms usually go away within 10 days, but they may last longer. The following may be recommended to manage your symptoms:  Rest from physical and mental activities as directed.  Mental activities are those that require thinking, concentration, and attention. You will need to rest until your symptoms are gone. Rest will allow you to recover from your concussion. Ask  your healthcare provider when you can return to work and other daily activities.    Have someone stay with you for the first 24 hours after your injury.  Your healthcare provider should be contacted if your symptoms get worse, or you develop new symptoms.    Do not participate in sports and physical activities until your healthcare provider says it is okay.  These can make your symptoms worse or lead to another concussion. Your healthcare provider will tell you when it is okay for you to return to sports or physical activities. Ask for more information about sports concussions.    Do not use heavy machinery or drive for 24 hours after your injury, or as directed.  This can be dangerous and cause a serious accident. Your healthcare provider will tell you when it is safe for you to return to these activities.    Pain medicine  may help relieve headache pain. Do not use NSAIDs or aspirin. These can increase your risk for bleeding. Your provider may recommend acetaminophen. Ask how much to take and how often to take it. Follow directions. Read the labels of all other medicines you are using to see if they also contain acetaminophen, or ask your doctor or pharmacist. Acetaminophen can cause liver damage if not taken correctly.    How can I help prevent another concussion?   Wear protective sports equipment that fits properly.  Helmets help decrease your risk for a serious brain injury. Talk to your healthcare provider about ways you can lower your risk for a concussion if you play sports.    Wear your seatbelt every time you travel.  This helps lower your risk for a head injury if you are in a car accident.    Where can I get more information?   [employer]  [address] [place] [address]  Phone: [phone]  Phone: 6- [phone]  Web Address: http://www.[employer]    Call your local emergency number (911 in the [place]), or have someone call if:   You cannot be woken.    You have a seizure,  increasing confusion, or a change in personality.    Your speech becomes slurred.    When should I seek immediate care?   You have sudden or new vision problems.    One of your pupils is bigger than the other.    You have a severe headache that does not go away.    You have arm or leg weakness, numbness, or new problems with coordination.    You have blood or clear fluid coming out of the ears or nose.    You cannot stop vomiting.    When should I call my doctor?   You have nausea or are vomiting.    You feel more sleepy than usual.    Your symptoms get worse.    Your symptoms last longer than 6 weeks.    You have questions or concerns about your condition or care.    CARE AGREEMENT:   You have the right to help plan your care. Learn about your health condition and how it may be treated. Discuss treatment options with your healthcare providers to decide what care you want to receive. You always have the right to refuse treatment. The above information is an  only. It is not intended as medical advice for individual conditions or treatments. Talk to your doctor, nurse or pharmacist before following any medical regimen to see if it is safe and effective for you.  © Copyright [employer] 2023 Information is for End User's use only and may not be sold, redistributed or otherwise used for commercial purposes.  1. Postpartum depression  Assessment & Plan:  Doing excellent on zoloft 50 mg. No SI or HI. Check in 3 months.       2. Concussion without loss of consciousness, initial encounter  Assessment & Plan:  Patient is definitely having concussion symptoms and actually they are worsening after 2 weeks so will check stat CT head.  Given information for concussion program and given handouts on concussion management.  She is to not do any activity which would possibly give her another concussion from falling, avoid screen time if at all possible although this is her job.  I did give the patient a note for work  stating she is undergoing evaluation.    Orders:  -     CT head wo contrast; Future; Expected date: 02/09/2024  -     Ambulatory Referral to Comprehensive Concussion Program; Future

## 2024-02-09 NOTE — PROGRESS NOTES
Name: Aaliyah Gomez      : 1995      MRN: [id number]  Encounter Provider: Neha Thapa PA-C  Encounter Date: 2024   Encounter department: [facility] PRIMARY CARE    Assessment & Plan     1. Postpartum depression  Assessment & Plan:  Doing excellent on zoloft 50 mg. No SI or HI. Check in 3 months.       2. Concussion without loss of consciousness, initial encounter  Assessment & Plan:  Patient is definitely having concussion symptoms and actually they are worsening after 2 weeks so will check stat CT head.  Given information for concussion program and given handouts on concussion management.  She is to not do any activity which would possibly give her another concussion from falling, avoid screen time if at all possible although this is her job.  I did give the patient a note for work stating she is undergoing evaluation.  Addendum: CT normal patient encouraged to follow-up with concussion therapy.    Orders:  -     CT head wo contrast; Future; Expected date: 2024  -     Ambulatory Referral to Comprehensive Concussion Program; Future           Subjective      Patient presents with:  Follow-up: Med follow up- zoloft  Head Injury: Hit her head 2 wks ago on her fire place on top of head. She said she is having a dejavu  feeling of what happened to her when she hit her head. Getting blurry vision on left side.    Pt has been tired more than usual, has trouble thinking and talking and concentrating sine seh hit her head on the corner of the fireplace.     Getting waves of weird sensation, with L sided blurred vision and L sided headaches and R lip feels like tugging to he side. Happens with sitting and moving. Now more often and multiple times during the day. Sometimes nauseas.     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  Patient has 4 children at home and just is trying to keep going with normal daily life as best as  possible.          Review of Systems   Constitutional: Negative.    HENT: Negative.     Eyes: Negative.    Respiratory: Negative.     Cardiovascular: Negative.    Gastrointestinal: Negative.    Endocrine: Negative.    Genitourinary: Negative.    Musculoskeletal: Negative.    Skin: Negative.    Allergic/Immunologic: Negative.    Neurological: Negative.    Hematological: Negative.    Psychiatric/Behavioral: Negative.         Current Outpatient Medications on File Prior to Visit   Medication Sig    albuterol (PROVENTIL HFA,VENTOLIN HFA) 90 mcg/act inhaler Inhale 2 puffs every 6 (six) hours as needed for wheezing    aspirin-acetaminophen-caffeine (EXCEDRIN MIGRAINE) 250-250-65 MG per tablet Take 1 tablet by mouth every 6 (six) hours as needed for headaches    Etonogestrel (NEXPLANON SC) Inject under the skin    Ferrous Sulfate Dried ER (Slow Release Iron) 160 (50 Fe) MG TBCR One daily    sertraline (Zoloft) 50 mg tablet Take 1 tablet (50 mg total) by mouth daily    SUMAtriptan (Imitrex) 25 mg tablet Take 1 tablet (25 mg total) by mouth once as needed for migraine for up to 1 dose       Objective     /62 (BP Location: Right arm, Patient Position: Sitting, Cuff Size: Adult)   Pulse 64   Ht 5' (1.524 m)   Wt 62.1 kg (137 lb)   LMP 12/05/2023 (Exact Date)   SpO2 100%   BMI 26.76 kg/m²     Physical Exam  Vitals and nursing note reviewed.   Constitutional:       General: She is not in acute distress.     Appearance: She is well-developed. She is not diaphoretic.   HENT:      Head: Normocephalic and atraumatic.      Nose: Nose normal.   Eyes:      General: Lids are normal. Gaze aligned appropriately.         Right eye: No discharge.         Left eye: No discharge.      Extraocular Movements:      Right eye: No nystagmus.      Left eye: No nystagmus.      Conjunctiva/sclera: Conjunctivae normal.      Comments: Patient does get dizzy when moving her eyes with her head still and with keeping her eyes still and moving  her head.   Neck:      Vascular: No carotid bruit.   Cardiovascular:      Rate and Rhythm: Normal rate and regular rhythm.      Heart sounds: Normal heart sounds. No murmur heard.     No friction rub. No gallop.   Pulmonary:      Effort: Pulmonary effort is normal. No respiratory distress.      Breath sounds: Normal breath sounds. No wheezing or rales.   Musculoskeletal:      Cervical back: Neck supple.   Skin:     General: Skin is warm and dry.   Neurological:      Mental Status: She is alert and oriented to person, place, and time.      Comments: Pt moving cautiously.   Psychiatric:         Judgment: Judgment normal.       Neha Thapa PA-C

## 2024-02-09 NOTE — LETTER
February 9, 2024     Patient: Aaliyah Gomez   YOB: 1995   Date of Visit: 2/9/2024       To Whom It May Concern:    It is my medical opinion that Aaliyah Gomez was diagnosed with a concussion. She needs to limit screen time and is undergoing further evaluation.     If you have any questions or concerns, please don't hesitate to call.         Sincerely,        Neha Thapa PA-C    CC: No Recipients

## 2024-02-12 ENCOUNTER — TELEPHONE (OUTPATIENT)
Age: 29
End: 2024-02-12

## 2024-02-12 NOTE — TELEPHONE ENCOUNTER
Called patient in regard of her concerns as she currently has an concussion and states she is still sensitive to light and having head pain, she works normally behind an computer.     Patient is wondering if she should work tomorrow and on and if that is the case if  provider is willing to write an letter excusing patient for work for as long that provider feels medically necessary for patient.     Please contact patient for any questions or concerns at [phone]. Thank you.

## 2024-02-12 NOTE — TELEPHONE ENCOUNTER
Patient called, following up with her [employer] message sent today.  She said it was discussed that she stay away from the computer for now, but was unsure when she should return to work.  She needed to give a time frame.  Asked if someone could return her call at [phone].

## 2024-02-13 ENCOUNTER — TELEPHONE (OUTPATIENT)
Dept: FAMILY MEDICINE CLINIC | Facility: CLINIC | Age: 29
End: 2024-02-13

## 2024-02-13 NOTE — TELEPHONE ENCOUNTER
Please let pt know that I did get the other message. There is no meds to take for a concussion. Treatment is to rest and avoid stimuli. It will not hurt her to work it will just likely postpone her recovery. I can not predict when she will feel better I can give her a note to be out of work if she feels she is unable to work as she uses the computer all day. I can also give her a note to only work 1/2 days. I am willing to work with the pt and write an excuse for whatever she feels is best and can afford to be out of work. She really does have some bad symptoms but at least her CT was negative.

## 2024-02-13 NOTE — TELEPHONE ENCOUNTER
----- Message from Fanny Murray sent at 2/13/2024  9:53 AM EST -----  Regarding: FW: Concussion   Contact: [phone]    ----- Message -----  From: Aaliyah Gomez  Sent: 2/12/2024   5:05 PM EST  To: Primary Care [facility]  Subject: Concussion                                       Hi Neha     I scheduled my appointment for the concussion clinic it’s next Thursday. At this present moment I’m still having pretty bad headaches and the dizziness. I was wondering if there is anything I should be doing in the meantime and also when would it be okay to work since I’m behind a computer the whole day. Please let me know . I tried calling but wasn’t able to get through.

## 2024-02-13 NOTE — TELEPHONE ENCOUNTER
Pt notified of recommendation. She will be calling her job to see what they are willing to work out with her

## 2024-02-14 ENCOUNTER — TELEPHONE (OUTPATIENT)
Dept: FAMILY MEDICINE CLINIC | Facility: CLINIC | Age: 29
End: 2024-02-14

## 2024-02-14 NOTE — TELEPHONE ENCOUNTER
Received form [employer] from [employer]. Form placed in Katherines folder

## 2024-02-22 ENCOUNTER — EVALUATION (OUTPATIENT)
Dept: PHYSICAL THERAPY | Facility: CLINIC | Age: 29
End: 2024-02-22
Payer: MEDICARE

## 2024-02-22 DIAGNOSIS — S06.0X0A CONCUSSION WITHOUT LOSS OF CONSCIOUSNESS, INITIAL ENCOUNTER: ICD-10-CM

## 2024-02-22 PROCEDURE — 97140 MANUAL THERAPY 1/> REGIONS: CPT | Performed by: PHYSICAL THERAPIST

## 2024-02-22 PROCEDURE — 97112 NEUROMUSCULAR REEDUCATION: CPT | Performed by: PHYSICAL THERAPIST

## 2024-02-22 PROCEDURE — 97162 PT EVAL MOD COMPLEX 30 MIN: CPT | Performed by: PHYSICAL THERAPIST

## 2024-02-22 NOTE — PROGRESS NOTES
PT Evaluation     Today's date: 2024  Patient name: Aaliyah Gomez  : 1995  MRN: [id number]  Referring provider: Neha Thapa PA-C  Dx:   Encounter Diagnosis     ICD-10-CM    1. Concussion without loss of consciousness, initial encounter  S06.0X0A Ambulatory Referral to Comprehensive Concussion Program                     Assessment  Assessment details: Pt is a 28 y.o. year old female presenting to physical therapy for Concussion without loss of consciousness and presents to physical therapy via comprehensive concussion program.  She presents with the following impairments: limited cervical ROM, hypomobility, L UE weakness, (+) special tests of cervical radiculopathy, poor convergence and (+) L head thrust test as well as symptomatic [place] hallpike on the L affecting her function with turning her neck, looking up/down, lifting, carrying, driving, working, reading, watching TV, and exercising.  Pt will benefit from skilled physical therapy to address functional limitations noted in evaluation and meet patient goals.  Impairments: abnormal coordination, abnormal muscle firing, abnormal muscle tone, abnormal or restricted ROM, abnormal movement, activity intolerance, impaired physical strength, lacks appropriate home exercise program, pain with function and poor body mechanics    Symptom irritability: moderate  Goals  ST. Pt will improve convergence to within 4 inches.  2. Pt will have pain-free cervical rotation b/l.    LT. Pt will improve L shoulder ABD to 5/5 for improved ability to lift.  2. Pt will meet projected FOTO score.    Plan  Patient would benefit from: PT eval and skilled physical therapy  Planned modality interventions: cryotherapy, electrical stimulation/Russian stimulation, TENS and thermotherapy: hydrocollator packs  Planned therapy interventions: abdominal trunk stabilization, joint mobilization, activity modification, manual therapy, massage, balance, behavior  modification, nerve gliding, neuromuscular re-education, body mechanics training, canalith repositioning, patient education, strengthening, stretching, therapeutic activities, therapeutic exercise, flexibility, functional ROM exercises and home exercise program  Frequency: 1x week  Duration in weeks: 4        Subjective Evaluation    History of Present Illness  Mechanism of injury: Pt reports hitting her head on the fireplace at home about 2-3 weeks ago.  She had a headache and dizziness following.  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  She is not working now due to the dizziness, sensitivity to light, and sounds.  She is not driving.  She reports some neck pain, and stiffness as well as some left sided arm numbness and tingling.  She is not taking meds, but is out of work for the next 4 weeks.  Pain  Current pain ratin          Objective     Concurrent Complaints  Positive for poor concentration.     Palpation   Left   Hypertonic in the levator scapulae and upper trapezius.   Tenderness of the levator scapulae and upper trapezius.   Trigger point to levator scapulae and upper trapezius.     Right   Hypertonic in the levator scapulae and upper trapezius.     Active Range of Motion   Cervical/Thoracic Spine       Cervical    Flexion:  with pain Restriction level: minimal  Extension:  with pain Restriction level: minimal  Left lateral flexion:  WFL and with pain  Right lateral flexion:  WFL and with pain  Left rotation:  WFL  Right rotation:  WFL    Joint Play   Joints within functional limits: C3, C4, C7, T1, T2 and T3     Hypomobile: C1, C2, C5 and C6     Pain: C1, C2, C5 and C6     Strength/Myotome Testing   Cervical Spine     Left   Interossei strength (t1): 4+    Right   Interossei strength (t1): 5    Left Shoulder     Planes of Motion   Flexion: 4+   Abduction: 4+     Isolated Muscles   Upper trapezius: 5     Right Shoulder     Planes of Motion   Flexion: 5  "  Abduction: 5     Isolated Muscles   Upper trapezius: 5     Left Elbow   Flexion: 4+  Extension: 4+    Right Elbow   Flexion: 5  Extension: 5    Left Wrist/Hand   Wrist extension: 4+  Wrist flexion: 4+    Right Wrist/Hand   Wrist extension: 5  Wrist flexion: 5    Tests   Cervical   Positive cervical distraction test.    Left Shoulder   Negative ULTT1.     Right Shoulder   Negative ULTT1.   Neuro Exam:     Dizziness  Positive for disequilibrium, vertigo, light-headedness and diplopia.     Symptoms   Average intensity: 5/10    Oculomotor exam   Oculomotor ROM: WNL  Resting nystagmus: not present   Gaze holding nystagmus: not present left  and not present right  Smooth pursuits: within normal limits  Vertical saccades: normal  Horizontal saccades: normal  Convergence: 12in  Convergence: abnormal  Head thrust: left abnormal and right abnormal    Positional testing   [place]-Hallpike   Left posterior canal: symptomatic  Right posterior canal: WNL             Precautions:     Date 2/22            Visit # IE            FOTO IE             Re-eval IE              Manuals 2/22            L Epleys 2x            C/s PROM SF            Lateral glides Gr II            UT STM w str                          Neuro Re-Ed             VOR x 1 hor 30\"            Convergence 5x10\"            Scap retraction 5x10\"            C/s retraction 5x10\"            SLS             DNF endurance                          Ther Ex             UBE/TM             Rows             No Money             UT str 20\"ea            Pec str                                                    Ther Activity                                       Gait Training                                       Modalities                                            "

## 2024-02-29 ENCOUNTER — OFFICE VISIT (OUTPATIENT)
Dept: PHYSICAL THERAPY | Facility: CLINIC | Age: 29
End: 2024-02-29
Payer: MEDICARE

## 2024-02-29 DIAGNOSIS — S06.0X0A CONCUSSION WITHOUT LOSS OF CONSCIOUSNESS, INITIAL ENCOUNTER: Primary | ICD-10-CM

## 2024-02-29 PROCEDURE — 97110 THERAPEUTIC EXERCISES: CPT | Performed by: PHYSICAL THERAPIST

## 2024-02-29 PROCEDURE — 97112 NEUROMUSCULAR REEDUCATION: CPT | Performed by: PHYSICAL THERAPIST

## 2024-02-29 PROCEDURE — 97140 MANUAL THERAPY 1/> REGIONS: CPT | Performed by: PHYSICAL THERAPIST

## 2024-02-29 NOTE — PROGRESS NOTES
"Daily Note     Today's date: 2024  Patient name: Aaliyah Gomez  : 1995  MRN: [id number]  Referring provider: Neha Thapa PA-C  Dx:   Encounter Diagnosis     ICD-10-CM    1. Concussion without loss of consciousness, initial encounter  S06.0X0A                      Subjective: Pt reports continued nausea and some dizziness this week.  She does well w HEP and has improved convergence.      Objective: See treatment diary below      Assessment:  Pt does well w progression of today's session, but has some difficulty with balance especially with head turns.  She has several LOB's with walking with head turns.  She does well w cervical PROM although continues to have UT stiffness and TTP.  Patient demonstrated fatigue post treatment, exhibited good technique with therapeutic exercises, and would benefit from continued PT.      Plan: Continue per plan of care.  Progress treatment as tolerated.       Precautions:     Date            Visit # IE 2           FOTO IE             Re-eval IE              Manuals            L Epleys 2x            C/s PROM SF SF           Lateral glides Gr II Gr III           UT STM w str  SF                        Neuro Re-Ed             VOR x 1 hor 30\" 2x30\"           Convergence 5x10\" 10x10\"           Scap retraction 5x10\"            C/s retraction 5x10\"            Tandem on airex  2x30 ea: VORx1 hor and stevenson           DNF endurance             Walking w head turns  3 laps           Ther Ex             UBE/TM  4'/4'           Rows  2x15 purple           No Money             UT str 20\"ea            Pec str  3x30\"                                                  Ther Activity                                       Gait Training                                       Modalities                                            "

## 2024-03-07 ENCOUNTER — APPOINTMENT (OUTPATIENT)
Dept: PHYSICAL THERAPY | Facility: CLINIC | Age: 29
End: 2024-03-07
Payer: MEDICARE

## 2024-03-08 ENCOUNTER — OFFICE VISIT (OUTPATIENT)
Dept: PHYSICAL THERAPY | Facility: CLINIC | Age: 29
End: 2024-03-08
Payer: MEDICARE

## 2024-03-08 DIAGNOSIS — S06.0X0A CONCUSSION WITHOUT LOSS OF CONSCIOUSNESS, INITIAL ENCOUNTER: Primary | ICD-10-CM

## 2024-03-08 PROCEDURE — 97112 NEUROMUSCULAR REEDUCATION: CPT | Performed by: PHYSICAL THERAPIST

## 2024-03-08 PROCEDURE — 97110 THERAPEUTIC EXERCISES: CPT | Performed by: PHYSICAL THERAPIST

## 2024-03-08 PROCEDURE — 97140 MANUAL THERAPY 1/> REGIONS: CPT | Performed by: PHYSICAL THERAPIST

## 2024-03-08 NOTE — PROGRESS NOTES
"Daily Note     Today's date: 3/8/2024  Patient name: Aaliyah Gomez  : 1995  MRN: [id number]  Referring provider: Neha Thapa PA-C  Dx:   Encounter Diagnosis     ICD-10-CM    1. Concussion without loss of consciousness, initial encounter  S06.0X0A                      Subjective: Pt reports less dizziness this week.      Objective: See treatment diary below      Assessment:  Pt has improved balance with progression of vestibular-ocular exercises in unstable positions.  She has some scapular fatigue with progression of rows and no money exercise.  She continues to have UT/LS stiffness and TTP b/l.  Patient demonstrated fatigue post treatment, exhibited good technique with therapeutic exercises, and would benefit from continued PT.      Plan: Continue per plan of care.  Progress treatment as tolerated.       Precautions:     Date 2/22 2/29 3/8          Visit # IE 2 3          FOTO IE             Re-eval IE              Manuals 2/22 2/29 3/8          L Epleys 2x            C/s PROM SF SF SF           Lateral glides Gr II Gr III Gr III          UT STM w str  SF SF                       Neuro Re-Ed             VOR x 1 hor 30\" 2x30\" 2x30\" vert and Hor on airex          VORx2   2x30\" Hor in SLS          Convergence 5x10\" 10x10\"           Scap retraction 5x10\"            C/s retraction 5x10\"            Tandem on airex  2x30 ea: VORx1 hor and stevenson 2x10 stevenson          DNF endurance             Walking w head turns  3 laps           Ther Ex             UBE/TM  4'/4' 4'/4'          Rows  2x15 purple 3x15 18#          No Money   2x15 PTB          UT str 20\"ea            Pec str  3x30\" 3x30\"                                                 Ther Activity                                       Gait Training                                       Modalities                                              "

## 2024-03-14 ENCOUNTER — APPOINTMENT (OUTPATIENT)
Dept: PHYSICAL THERAPY | Facility: CLINIC | Age: 29
End: 2024-03-14
Payer: MEDICARE

## 2024-03-15 ENCOUNTER — APPOINTMENT (OUTPATIENT)
Dept: PHYSICAL THERAPY | Facility: CLINIC | Age: 29
End: 2024-03-15
Payer: MEDICARE

## 2024-03-21 ENCOUNTER — OFFICE VISIT (OUTPATIENT)
Dept: PHYSICAL THERAPY | Facility: CLINIC | Age: 29
End: 2024-03-21
Payer: MEDICARE

## 2024-03-21 DIAGNOSIS — S06.0X0A CONCUSSION WITHOUT LOSS OF CONSCIOUSNESS, INITIAL ENCOUNTER: Primary | ICD-10-CM

## 2024-03-21 PROCEDURE — 97112 NEUROMUSCULAR REEDUCATION: CPT | Performed by: PHYSICAL THERAPIST

## 2024-03-21 PROCEDURE — 97110 THERAPEUTIC EXERCISES: CPT | Performed by: PHYSICAL THERAPIST

## 2024-03-21 PROCEDURE — 97140 MANUAL THERAPY 1/> REGIONS: CPT | Performed by: PHYSICAL THERAPIST

## 2024-03-21 NOTE — PROGRESS NOTES
"DC Note     Today's date: 3/21/2024  Patient name: Aaliyah Gomez  : 1995  MRN: [id number]  Referring provider: Neha Thapa PA-C  Dx:   Encounter Diagnosis     ICD-10-CM    1. Concussion without loss of consciousness, initial encounter  S06.0X0A                      Subjective: Pt reports her head and neck are feeling much better, and she only has the occasional dizzy spell which she thinks is related to her hydration.      Objective: See treatment diary below      Assessment:  Pt does well w today's session and has no adverse sx. She has some balance difficulty with VORx1 on airex, but otherwise has minimal impairments to function t/o session.  She is being DC'd w updated HEP.      Plan: Continue per plan of care.  Progress treatment as tolerated.       Precautions:     Date 2/22 2/29 3/8 3/21         Visit # IE 2 3 4         FOTO IE             Re-eval IE              Manuals 2/22 2/29 3/8 3/21         L Epleys 2x            C/s PROM SF SF SF  SF         Lateral glides Gr II Gr III Gr III Gr III         UT STM w str  SF SF SF                      Neuro Re-Ed             VOR x 1 hor 30\" 2x30\" 2x30\" vert and Hor on airex 2x30\" ea standing NBOS airex         VORx2   2x30\" Hor in SLS 2x30\" hor airex NBOS         Convergence 5x10\" 10x10\"           Scap retraction 5x10\"            C/s retraction 5x10\"            Tandem on airex  2x30 ea: VORx1 hor and stevenson 2x10 stevenson          DNF endurance    3x20\"         Walking w head turns  3 laps           Ther Ex             UBE/TM  4'/4' 4'/4' 4'/4' 2.5mph         Rows/ext  2x15 purple 3x15 18# 2x15 20#/10#         No Money   2x15 PTB 2x15 PTB         UT str 20\"ea            Pec str  3x30\" 3x30\"                                                 Ther Activity                                       Gait Training                                       Modalities                                                "

## 2024-04-15 ENCOUNTER — OFFICE VISIT (OUTPATIENT)
Dept: OBGYN CLINIC | Facility: CLINIC | Age: 29
End: 2024-04-15
Payer: MEDICARE

## 2024-04-15 VITALS
DIASTOLIC BLOOD PRESSURE: 62 MMHG | WEIGHT: 144 LBS | HEIGHT: 60 IN | SYSTOLIC BLOOD PRESSURE: 100 MMHG | BODY MASS INDEX: 28.27 KG/M2

## 2024-04-15 DIAGNOSIS — Z30.016 ENCOUNTER FOR INITIAL PRESCRIPTION OF TRANSDERMAL PATCH HORMONAL CONTRACEPTIVE DEVICE: Primary | ICD-10-CM

## 2024-04-15 PROCEDURE — 99213 OFFICE O/P EST LOW 20 MIN: CPT | Performed by: OBSTETRICS & GYNECOLOGY

## 2024-04-15 RX ORDER — NORELGESTROMIN AND ETHINYL ESTRADIOL 35; 150 UG/MG; UG/MG
1 PATCH TRANSDERMAL WEEKLY
Qty: 3 PATCH | Refills: 16 | Status: SHIPPED | OUTPATIENT
Start: 2024-04-15

## 2024-04-15 NOTE — PROGRESS NOTES
orthoSubjective    Patient is a 29 yo  who presents today to discuss contraception. She was initially scheduled for a tubal ligation in February, but decided to keep her Nexplanon as she wasn't sure she was finished childbearing. The Nexplanon was inserted 2023, and she has had consistent bleeding since then. She has previously tried OCPs, but wasn't consistently taking them. She has previously tried Depo Provera, but had significant weight gain. She also tried an IUD previously, but became pregnant with twins. She is interested in the patch.    OB History          8    Para   4    Term   4            AB   4    Living   4         SAB   2    IAB   2    Ectopic        Multiple   0    Live Births   4           Obstetric Comments   Menarche 11  Cycles Q 28d                      Objective    Vitals:    04/15/24 1030   BP: 100/62   BP Location: Left arm   Patient Position: Sitting   Cuff Size: Standard   Weight: 65.3 kg (144 lb)   Height: 5' (1.524 m)        Physical Exam  Constitutional:       Appearance: Normal appearance.   HENT:      Head: Normocephalic and atraumatic.   Cardiovascular:      Rate and Rhythm: Normal rate.   Pulmonary:      Effort: Pulmonary effort is normal. No respiratory distress.   Musculoskeletal:         General: Normal range of motion.      Comments: Nexplanon easily palpable in left arm   Neurological:      Mental Status: She is alert.   Skin:     General: Skin is warm and dry.          Assessment    Patient Active Problem List   Diagnosis    Low grade squamous intraepithelial lesion (LGSIL) on cervical Pap smear    Asthma during pregnancy    Dysphoric mood    Irregular bleeding    Postcoital bleeding    Dysmenorrhea     (spontaneous vaginal delivery)    Anemia affecting pregnancy in third trimester    History of  delivery, currently pregnant in third trimester    Chronic nonintractable headache    Breast skin changes    Postpartum depression    Concussion  with no loss of consciousness        Plan   - Ortho Evra patch prescribed  - Reviewed how to use patch  - Patient to return for Nexplanon removal

## 2024-04-17 ENCOUNTER — PROCEDURE VISIT (OUTPATIENT)
Dept: OBGYN CLINIC | Facility: CLINIC | Age: 29
End: 2024-04-17
Payer: MEDICARE

## 2024-04-17 VITALS
WEIGHT: 145 LBS | HEIGHT: 60 IN | DIASTOLIC BLOOD PRESSURE: 66 MMHG | BODY MASS INDEX: 28.47 KG/M2 | SYSTOLIC BLOOD PRESSURE: 118 MMHG

## 2024-04-17 DIAGNOSIS — Z30.46 ENCOUNTER FOR NEXPLANON REMOVAL: Primary | ICD-10-CM

## 2024-04-17 PROCEDURE — 11982 REMOVE DRUG IMPLANT DEVICE: CPT | Performed by: OBSTETRICS & GYNECOLOGY

## 2024-04-17 NOTE — PROGRESS NOTES
"Universal Protocol:  Consent: Verbal consent obtained. Written consent obtained.  Risks and benefits: risks, benefits and alternatives were discussed  Consent given by: patient  Time out: Immediately prior to procedure a \"time out\" was called to verify the correct patient, procedure, equipment, support staff and site/side marked as required.  Patient understanding: patient states understanding of the procedure being performed  Patient consent: the patient's understanding of the procedure matches consent given  Procedure consent: procedure consent matches procedure scheduled  Site marked: the operative site was marked  Required items: required blood products, implants, devices, and special equipment available  Patient identity confirmed: verbally with patient  Remove and insert drug implant    Date/Time: 4/17/2024 3:30 PM    Performed by: Radha Brown MD  Authorized by: Radha Brown MD    Indication:     Indication: Presence of non-biodegradable drug delivery implant    Pre-procedure:     Pre-procedure timeout performed: yes      Prepped with: povidone-iodine      Local anesthetic:  Lidocaine with epinephrine    The site was cleaned and prepped in a sterile fashion: yes    Procedure:     Procedure:  Removal    Small stab incision was made in arm: yes      Left/right:  Left    Site was closed with steri-strips and pressure bandage applied: yes    Comments:      Nexplanon was removed from left arm without complication. Steri Strips and pressure bandage applied afterwards      "

## 2024-04-19 ENCOUNTER — OFFICE VISIT (OUTPATIENT)
Dept: FAMILY MEDICINE CLINIC | Facility: CLINIC | Age: 29
End: 2024-04-19
Payer: MEDICARE

## 2024-04-19 VITALS
OXYGEN SATURATION: 98 % | WEIGHT: 144 LBS | HEART RATE: 79 BPM | DIASTOLIC BLOOD PRESSURE: 60 MMHG | SYSTOLIC BLOOD PRESSURE: 90 MMHG | BODY MASS INDEX: 28.27 KG/M2 | HEIGHT: 60 IN

## 2024-04-19 DIAGNOSIS — G43.901 MIGRAINE WITH STATUS MIGRAINOSUS, NOT INTRACTABLE, UNSPECIFIED MIGRAINE TYPE: Primary | ICD-10-CM

## 2024-04-19 DIAGNOSIS — S06.0X0D CONCUSSION WITHOUT LOSS OF CONSCIOUSNESS, SUBSEQUENT ENCOUNTER: ICD-10-CM

## 2024-04-19 PROCEDURE — 99214 OFFICE O/P EST MOD 30 MIN: CPT | Performed by: PHYSICIAN ASSISTANT

## 2024-04-19 PROCEDURE — 96372 THER/PROPH/DIAG INJ SC/IM: CPT | Performed by: PHYSICIAN ASSISTANT

## 2024-04-19 RX ORDER — KETOROLAC TROMETHAMINE 30 MG/ML
60 INJECTION, SOLUTION INTRAMUSCULAR; INTRAVENOUS ONCE
Status: COMPLETED | OUTPATIENT
Start: 2024-04-19 | End: 2024-04-19

## 2024-04-19 RX ADMIN — KETOROLAC TROMETHAMINE 60 MG: 30 INJECTION, SOLUTION INTRAMUSCULAR; INTRAVENOUS at 15:21

## 2024-04-19 NOTE — LETTER
April 19, 2024     Patient: Aaliyah Gomez  YOB: 1995  Date of Visit: 4/19/2024      To Whom it May Concern:    Aaliyah Gomez is under my professional care. Aaliyah was seen in my office on 4/19/2024. Aaliyah may return to school on 4/22/24 .    If you have any questions or concerns, please don't hesitate to call.         Sincerely,          Neha Thapa PA-C        CC: No Recipients

## 2024-04-19 NOTE — ASSESSMENT & PLAN NOTE
Pt seems to have experienced her first migraine with ocular involvment today. Improving somewhat but will give toradol 60 today. In the future to utilize her imetrex like with normal headaches. CT head done roughly one month ago and WNL. IF headaches increase in frequency may consider daily prophylaxis meds. Is s/p recent concussion so may also still be brain inflammation.

## 2024-04-19 NOTE — PATIENT INSTRUCTIONS
1. Migraine with status migrainosus, not intractable, unspecified migraine type  Assessment & Plan:  Pt seems to have experienced her first migraine with ocular involvment today. Improving somewhat but will give toradol 60 today. In the future to utilize her imetrex like with normal headaches. CT head done roughly one month ago and WNL. IF headaches increase in frequency may consider daily prophylaxis meds. Is s/p recent concussion so may also still be brain inflammation.     Orders:  -     ketorolac (TORADOL) 60 mg/2 mL IM injection 60 mg

## 2024-04-19 NOTE — PROGRESS NOTES
Name: Aaliyah Gomez      : 1995      MRN: [id number]  Encounter Provider: Neha Thapa PA-C  Encounter Date: 2024   Encounter department: [facility]    Assessment & Plan     1. Migraine with status migrainosus, not intractable, unspecified migraine type  Assessment & Plan:  Pt seems to have experienced her first migraine with ocular involvment today. Improving somewhat but will give toradol 60 today. In the future to utilize her imetrex like with normal headaches. CT head done roughly one month ago and WNL. IF headaches increase in frequency may consider daily prophylaxis meds. Is s/p recent concussion so may also still be brain inflammation.     Orders:  -     ketorolac (TORADOL) 60 mg/2 mL IM injection 60 mg    2. Concussion without loss of consciousness, subsequent encounter  Assessment & Plan:  Pt with recent CT head WNL and s/p PT eval and treat.            Subjective      Today at work R eye got very blurry and then no vision, headache started, dizzy felt like was going to pass out. New glasses w/in 2-3 weeks. She was working from home and told her boss to take her out of work as she did not feel good. Still with headache from hairline over scalp into her neck like stiff but improved. Has hx of migraines worse after baby delivery. Recently had a concussion about one month ago with WNL CT head and is s/p PT eval and treat. Has been back to work for about 3 weeks.       Review of Systems   Constitutional: Negative.    HENT: Negative.     Eyes:  Positive for visual disturbance.   Respiratory: Negative.     Cardiovascular: Negative.    Gastrointestinal: Negative.    Endocrine: Negative.    Genitourinary: Negative.    Musculoskeletal: Negative.    Skin: Negative.    Allergic/Immunologic: Negative.    Neurological:  Positive for headaches.   Hematological: Negative.    Psychiatric/Behavioral: Negative.         Current Outpatient Medications on File Prior to Visit    Medication Sig   • albuterol (PROVENTIL HFA,VENTOLIN HFA) 90 mcg/act inhaler Inhale 2 puffs every 6 (six) hours as needed for wheezing   • Ferrous Sulfate Dried ER (Slow Release Iron) 160 (50 Fe) MG TBCR One daily   • sertraline (Zoloft) 50 mg tablet Take 1 tablet (50 mg total) by mouth daily   • SUMAtriptan (Imitrex) 25 mg tablet Take 1 tablet (25 mg total) by mouth once as needed for migraine for up to 1 dose   • aspirin-acetaminophen-caffeine (EXCEDRIN MIGRAINE) 250-250-65 MG per tablet Take 1 tablet by mouth every 6 (six) hours as needed for headaches (Patient not taking: Reported on 4/19/2024)   • Etonogestrel (NEXPLANON SC) Inject under the skin (Patient not taking: Reported on 4/17/2024)   • norelgestromin-ethinyl estradiol (ORTHO EVRA) 150-35 MCG/24HR Place 1 patch on the skin over 7 days once a week (Patient not taking: Reported on 4/19/2024)       Objective     BP 90/60 (BP Location: Left arm, Patient Position: Sitting, Cuff Size: Standard)   Pulse 79   Ht 5' (1.524 m)   Wt 65.3 kg (144 lb)   LMP 03/25/2024 (Exact Date)   SpO2 98%   BMI 28.12 kg/m²     Physical Exam  Vitals and nursing note reviewed.   Constitutional:       General: She is not in acute distress.     Appearance: She is well-developed. She is not diaphoretic.   HENT:      Head: Normocephalic and atraumatic.      Nose: Nose normal.   Eyes:      General: Lids are normal. Vision grossly intact. Gaze aligned appropriately. No visual field deficit.        Right eye: No discharge.         Left eye: No discharge.      Conjunctiva/sclera: Conjunctivae normal.   Neck:      Vascular: No carotid bruit.   Cardiovascular:      Rate and Rhythm: Normal rate and regular rhythm.      Heart sounds: Normal heart sounds. No murmur heard.     No friction rub. No gallop.   Pulmonary:      Effort: Pulmonary effort is normal. No respiratory distress.      Breath sounds: Normal breath sounds. No wheezing or rales.   Musculoskeletal:      Cervical back: Neck  supple.   Skin:     General: Skin is warm and dry.   Neurological:      Mental Status: She is alert and oriented to person, place, and time.   Psychiatric:         Judgment: Judgment normal.       Neha Thapa PA-C

## 2024-08-06 ENCOUNTER — OFFICE VISIT (OUTPATIENT)
Dept: FAMILY MEDICINE CLINIC | Facility: CLINIC | Age: 29
End: 2024-08-06
Payer: COMMERCIAL

## 2024-08-06 VITALS
HEART RATE: 59 BPM | BODY MASS INDEX: 28.47 KG/M2 | WEIGHT: 145 LBS | DIASTOLIC BLOOD PRESSURE: 62 MMHG | SYSTOLIC BLOOD PRESSURE: 98 MMHG | OXYGEN SATURATION: 99 % | HEIGHT: 60 IN

## 2024-08-06 DIAGNOSIS — G43.E01 CHRONIC MIGRAINE WITH AURA AND WITH STATUS MIGRAINOSUS, NOT INTRACTABLE: ICD-10-CM

## 2024-08-06 DIAGNOSIS — O99.013 ANEMIA AFFECTING PREGNANCY IN THIRD TRIMESTER: Primary | ICD-10-CM

## 2024-08-06 DIAGNOSIS — S06.0X0D CONCUSSION WITHOUT LOSS OF CONSCIOUSNESS, SUBSEQUENT ENCOUNTER: ICD-10-CM

## 2024-08-06 PROCEDURE — 99214 OFFICE O/P EST MOD 30 MIN: CPT | Performed by: PHYSICIAN ASSISTANT

## 2024-08-06 RX ORDER — RIZATRIPTAN BENZOATE 10 MG/1
10 TABLET ORAL ONCE AS NEEDED
Qty: 10 TABLET | Refills: 5 | Status: SHIPPED | OUTPATIENT
Start: 2024-08-06

## 2024-08-06 NOTE — PROGRESS NOTES
Ambulatory Visit  Name: Aaliyah Gomez      : 1995      MRN: [id number]  Encounter Provider: Neah Thapa PA-C  Encounter Date: 2024   Encounter department: [facility]    Assessment & Plan   1. Anemia affecting pregnancy in third trimester  Assessment & Plan:  Over due for CBC.   2. Chronic migraine with aura and with status migrainosus, not intractable  Assessment & Plan:  PT has tried and failed imetrex. Will refer to neuro and in the meantime trial maxalt. Did not benefit from zoloft, can not initiate BB due to low BP. Can consider topomax daily or Nurtec in the future.   Orders:  -     rizatriptan (MAXALT) 10 mg tablet; Take 1 tablet (10 mg total) by mouth once as needed for migraine for up to 1 dose may repeat in 2 hours if necessary  -     Ambulatory Referral to Neurology; Future  3. Concussion without loss of consciousness, subsequent encounter  Assessment & Plan:  Pt continues with migraine exacerbation since concussion. Did go through concussion clinic therapy already.      History of Present Illness     Patient presents with:  Headache: Pt present today for headache and dizziness, loss of vision, chest pain and shaky. Per pt she was already evaluated for it, but the medication is not helping. The symptoms have worsened since her concussion back in February.    Headaches were occurring with her last pregnancy 2023 and then has gotten much worse since her concussion this past Feb.     Headaches are daily and has 10 out of 10 headache about 2 times a week. Headaches feel like a pressure build up all over her head and pain in her temples down into her neck dn top of head hurts to the touch. Gets dizzy with movement and also can start shaking. Has tried Imetrex up to 50 mg twice w/o help. Does get nausea and sometimes will get tunnel vision or no vision with her headaches.   CT WNL.             Review of Systems   Constitutional: Negative.    HENT: Negative.      Eyes: Negative.    Respiratory: Negative.     Cardiovascular: Negative.    Gastrointestinal: Negative.    Endocrine: Negative.    Genitourinary: Negative.    Musculoskeletal: Negative.    Skin: Negative.    Allergic/Immunologic: Negative.    Neurological:  Positive for headaches.   Hematological: Negative.    Psychiatric/Behavioral: Negative.         Objective     BP 98/62 (BP Location: Left arm, Patient Position: Sitting, Cuff Size: Standard)   Pulse 59   Ht 5' (1.524 m)   Wt 65.8 kg (145 lb)   SpO2 99%   BMI 28.32 kg/m²     Physical Exam  Vitals and nursing note reviewed.   Constitutional:       Appearance: Normal appearance. She is well-developed.   HENT:      Head: Normocephalic and atraumatic.   Eyes:      General: Lids are normal.      Conjunctiva/sclera: Conjunctivae normal.      Pupils: Pupils are equal, round, and reactive to light.   Cardiovascular:      Rate and Rhythm: Normal rate and regular rhythm.      Heart sounds: No murmur heard.  Pulmonary:      Effort: Pulmonary effort is normal.      Breath sounds: Normal breath sounds.   Skin:     General: Skin is warm and dry.   Neurological:      General: No focal deficit present.      Mental Status: She is alert.      Coordination: Coordination is intact.   Psychiatric:         Mood and Affect: Mood normal.         Behavior: Behavior normal. Behavior is cooperative.         Thought Content: Thought content normal.         Judgment: Judgment normal.       Administrative Statements

## 2024-08-08 NOTE — PATIENT INSTRUCTIONS
1. Anemia affecting pregnancy in third trimester  Assessment & Plan:  Over due for CBC.   2. Chronic migraine with aura and with status migrainosus, not intractable  Assessment & Plan:  PT has tried and failed imetrex. Will refer to neuro and in the meantime trial maxalt. Did not benefit from zoloft, can not initiate BB due to low BP. Can consider topomax daily or Nurtec in the future.   Orders:  -     rizatriptan (MAXALT) 10 mg tablet; Take 1 tablet (10 mg total) by mouth once as needed for migraine for up to 1 dose may repeat in 2 hours if necessary  -     Ambulatory Referral to Neurology; Future  3. Concussion without loss of consciousness, subsequent encounter  Assessment & Plan:  Pt continues with migraine exacerbation since concussion. Did go through concussion clinic therapy already.

## 2024-08-08 NOTE — ASSESSMENT & PLAN NOTE
PT has tried and failed imetrex. Will refer to neuro and in the meantime trial maxalt. Did not benefit from zoloft, can not initiate BB due to low BP. Can consider topomax daily or Nurtec in the future.

## 2024-08-08 NOTE — ASSESSMENT & PLAN NOTE
Pt continues with migraine exacerbation since concussion. Did go through concussion clinic therapy already.

## 2024-08-13 ENCOUNTER — OFFICE VISIT (OUTPATIENT)
Dept: NEUROLOGY | Facility: CLINIC | Age: 29
End: 2024-08-13
Payer: COMMERCIAL

## 2024-08-13 VITALS
SYSTOLIC BLOOD PRESSURE: 102 MMHG | OXYGEN SATURATION: 100 % | HEART RATE: 67 BPM | TEMPERATURE: 98.5 F | WEIGHT: 144.5 LBS | BODY MASS INDEX: 28.22 KG/M2 | DIASTOLIC BLOOD PRESSURE: 64 MMHG

## 2024-08-13 DIAGNOSIS — G43.E01 CHRONIC MIGRAINE WITH AURA AND WITH STATUS MIGRAINOSUS, NOT INTRACTABLE: Primary | ICD-10-CM

## 2024-08-13 DIAGNOSIS — R51.9 CHRONIC DAILY HEADACHE: ICD-10-CM

## 2024-08-13 DIAGNOSIS — F41.9 ANXIETY AND DEPRESSION: ICD-10-CM

## 2024-08-13 DIAGNOSIS — F32.A ANXIETY AND DEPRESSION: ICD-10-CM

## 2024-08-13 DIAGNOSIS — S06.0X0D CONCUSSION WITHOUT LOSS OF CONSCIOUSNESS, SUBSEQUENT ENCOUNTER: ICD-10-CM

## 2024-08-13 PROCEDURE — 99205 OFFICE O/P NEW HI 60 MIN: CPT

## 2024-08-13 RX ORDER — NORTRIPTYLINE HCL 10 MG
20 CAPSULE ORAL
Qty: 60 CAPSULE | Refills: 3 | Status: SHIPPED | OUTPATIENT
Start: 2024-08-13

## 2024-08-13 NOTE — PROGRESS NOTES
Review of Systems   Constitutional:  Negative for appetite change, fatigue and fever.   HENT: Negative.  Negative for hearing loss, tinnitus, trouble swallowing and voice change.    Eyes:  Positive for photophobia, pain and visual disturbance.   Respiratory: Negative.  Negative for shortness of breath.    Cardiovascular: Negative.  Negative for palpitations.   Gastrointestinal:  Positive for nausea and vomiting.   Endocrine: Negative.  Negative for cold intolerance.   Genitourinary: Negative.  Negative for dysuria, frequency and urgency.   Musculoskeletal:  Positive for neck pain and neck stiffness. Negative for back pain, gait problem and myalgias.   Skin: Negative.  Negative for rash.   Allergic/Immunologic: Negative.    Neurological:  Positive for headaches. Negative for dizziness, tremors, seizures, syncope, facial asymmetry, speech difficulty, weakness, light-headedness and numbness.   Hematological: Negative.  Does not bruise/bleed easily.   Psychiatric/Behavioral: Negative.  Negative for confusion, hallucinations and sleep disturbance.    All other systems reviewed and are negative.

## 2024-08-13 NOTE — PATIENT INSTRUCTIONS
Patient Instructions:    Additional Testing:   Neurodiagnostic workup:  MRI Brain without contrast ordered    Headache Calendar  Please maintain a headache calendar  Consider using phone applications such as Migraine Timmy or Migraine Diary    Headache/migraine treatment:     Rescue medications (for immediate treatment of a headache):   It is ok to take ibuprofen, acetaminophen or naproxen (Advil, Tylenol,  Aleve, Excedrin) if they help your headaches you should limit these to No more than 3 times a week to avoid medication overuse/rebound headaches.     For your more moderate to severe migraines take this medication early   - Start Maxalt (rizatriptan) 10mg tabs - take one at the onset of headache. May repeat one time after 2 hours if pain has not resolved.   (Max 2 a day and 10 a month)     Prescription preventive medications for headaches/migraines   (to take every day to help prevent headaches - not to take at the time of headache):  - Start nortriptyline 10 mg, take 2 capsules by mouth once daily at bedtime for migraine prevention.  Would advise for the patient to start taking just 1 capsule at bedtime for the first week.  As long as she is tolerating this, then she can increase to 2 capsules at bedtime afterwards.  She will continue this medication for 6 to 8 weeks at this dosage and call me know if there is any significant improvement or any worsening with the headaches at all.    *Typically these types of medications take time until you see the benefit, although some may see improvement in days, often it may take weeks, especially if the medication is being titrated up to a beneficial level. Please contact us if there are any concerns or questions regarding the medication.     Over the counter preventive supplements for headaches/migraines (if you try, try for 3 months straight)  (to take every day to help prevent headaches - not to take at the time of headache):  There are combo pills online of these - none  of which regulated by FDA and double check dosing - take appropriate dose only once a day- prevent a migraine, migravent, mind ease, migrelief   [] Magnesium 400mg daily (If any diarrhea or upset stomach, decrease dose  as tolerated)  [] Riboflavin (Vitamin B2) 400mg daily (may make your urine bright/neon yellow)  - All supplements can be purchased online    Lifestyle Recommendations:  [x] SLEEP - Maintain a regular sleep schedule: Adults need at least 7-8 hours of uninterrupted a night. Maintain good sleep hygiene:  Going to bed and waking up at consistent times, avoiding excessive daytime naps, avoiding caffeinated beverages in the evening, avoid excessive stimulation in the evening and generally using bed primarily for sleeping.  One hour before bedtime would recommend turning lights down lower, decreasing your activity (may read quietly, listen to music at a low volume). When you get into bed, should eliminate all technology (no texting, emailing, playing with your phone, iPad or tablet in bed).  [x] HYDRATION - Maintain good hydration.  Drink  2L of fluid a day (4 typical small water bottles)  [x] DIET - Maintain good nutrition. In particular don't skip meals and try and eat healthy balanced meals regularly.  [x] TRIGGERS - Look for other triggers and avoid them: Limit caffeine to 1-2 cups a day or less. Avoid dietary triggers that you have noticed bring on your headaches (this could include aged cheese, peanuts, MSG, aspartame and nitrates).  [x] EXERCISE - physical exercise as we all know is good for you in many ways, and not only is good for your heart, but also is beneficial for your mental health, cognitive health and  chronic pain/headaches. I would encourage at the least 5 days of physical exercise weekly for at least 30 minutes.     Education and Follow-up  [x] Please call with any questions or concerns. Of course if any new concerning symptoms go to the emergency department.  [x] Follow up with 4  months with Isaiah AVALOS

## 2024-08-13 NOTE — PROGRESS NOTES
[facility] Consult  PATIENT:  Aaliyah Gomez  MRN:  [id number]  :  1995  DATE OF SERVICE:  2024  REFERRED BY: Neha Thapa PA-C  PMD: Neha Thapa PA-C    Assessment/Plan:     Aaliyah Gomez is a very pleasant 28 y.o. female with a past medical history that includes migraines, asthma during pregnancy, dysmenorrhea, dysphoric mood, postpartum depression referred here for evaluation of headache.    Initial evaluation 2024    Chronic migraine with aura without status migrainosus:    I had the pleasure of seeing Aaliyah today in the office at [facility] in [place].  She is presenting today for an initial new patient consultation in regard to headaches.  The patient stated that she started having headaches at approximately 27 years old.  This started after the birth of her fourth child.  The patient reported that she had a concussion in 2024 where she had hit her head on the corner of her fireplace when bending over.  Headaches have been much worse afterwards.  The patient states that she has a headache 30/30 days in the month.  8-12 of those days are severe debilitating headache days.  The patient states that she is sometimes waking up with headaches but not always waking up with them.  They can sometimes occur before bed or after work.  She notes that usually the headaches are lasting until she is going to sleep at night.  Will wake up with a headache when she does go to bed with a headache.  She notes that she will take Tylenol PM at bedtime to help her sleep with headache.  Sumatriptan did not really seem to help in the past, was prescribed Maxalt but had not taken the medication yet.  She notes that she does have an aura with headaches, usually having loss of vision and tunnel vision out of both eyes that will usually occur few minutes before the headaches at this time.  Patient will have a  headache located at the bilateral temples, bilateral top of the head and back of the head.  Stabbing pain in the temples and pressure at the top of the head and stiff/sore neck.  Patient does note migrainous symptoms of nausea, photophobia, phonophobia, osmophobia, blurred vision/loss of vision that usually last about 20 minutes in length and the right eye is worse than the left.  Patient will also have lightheadedness/dizziness, tinnitus of the right ear, lacrimation and nasal congestion as well.  Coughing, sneezing, bending over can make the headaches worse.  Standing up too fast can trigger headache as well.  The patient had never been seen in the past by neurology.  She notes that she is not currently pregnant, only using condoms for contraception.  She is not planning on having another child and not currently breast-feeding.  She notes that she had a CT of the head without contrast in February 2024 when she did hit her head.  No significant intracranial abnormality detected.    Based on my evaluation of the patient, does appear that the patient is suffering from chronic migraines with aura.  Advised that if the patient is still continuing to have headaches and she has never had an MRI brain in the past, may be a good idea to evaluate with an MRI of the brain to rule out any intracranial abnormality.  Patient had agreed, was ordered MRI brain without contrast for further evaluation of her headaches especially since the headaches continue to persist daily after having her concussion in February.  In regards to medication therapy for headaches, patient was compliant with trying preventative medication as well as continuing with abortive medication.  Recommended that the patient try nortriptyline for migraine prevention.  She was also advised to take Maxalt for abortive therapy as well as she has been recently prescribed the medication but has not taken it yet.  Advised patient to continue to work on lifestyle  factors that could contribute to the headache.  Making sure that she is trying to get enough hours of sleep at night, encouraging the patient to drink anywhere between 64 to 72 ounces of water throughout the day.  Noted that the patient should continue to talk with therapist in regards to her anxiety and depression as well.  Hopefully nortriptyline will also help with anxiety and depression along with the headaches.  Advised the patient to follow-up in about 4 months time for further evaluation and monitoring.    Patient Instructions:    Additional Testing:   Neurodiagnostic workup:  MRI Brain without contrast ordered    Headache Calendar  Please maintain a headache calendar  Consider using phone applications such as Migraine Timmy or Migraine Diary    Headache/migraine treatment:     Rescue medications (for immediate treatment of a headache):   It is ok to take ibuprofen, acetaminophen or naproxen (Advil, Tylenol,  Aleve, Excedrin) if they help your headaches you should limit these to No more than 3 times a week to avoid medication overuse/rebound headaches.     For your more moderate to severe migraines take this medication early   - Start Maxalt (rizatriptan) 10mg tabs - take one at the onset of headache. May repeat one time after 2 hours if pain has not resolved.   (Max 2 a day and 10 a month)     Prescription preventive medications for headaches/migraines   (to take every day to help prevent headaches - not to take at the time of headache):  - Start nortriptyline 10 mg, take 2 capsules by mouth once daily at bedtime for migraine prevention.  Would advise for the patient to start taking just 1 capsule at bedtime for the first week.  As long as she is tolerating this, then she can increase to 2 capsules at bedtime afterwards.  She will continue this medication for 6 to 8 weeks at this dosage and call me know if there is any significant improvement or any worsening with the headaches at all.    *Typically these  types of medications take time until you see the benefit, although some may see improvement in days, often it may take weeks, especially if the medication is being titrated up to a beneficial level. Please contact us if there are any concerns or questions regarding the medication.     Over the counter preventive supplements for headaches/migraines (if you try, try for 3 months straight)  (to take every day to help prevent headaches - not to take at the time of headache):  There are combo pills online of these - none of which regulated by FDA and double check dosing - take appropriate dose only once a day- prevent a migraine, migravent, mind ease, migrelief   [] Magnesium 400mg daily (If any diarrhea or upset stomach, decrease dose  as tolerated)  [] Riboflavin (Vitamin B2) 400mg daily (may make your urine bright/neon yellow)  - All supplements can be purchased online    Lifestyle Recommendations:  [x] SLEEP - Maintain a regular sleep schedule: Adults need at least 7-8 hours of uninterrupted a night. Maintain good sleep hygiene:  Going to bed and waking up at consistent times, avoiding excessive daytime naps, avoiding caffeinated beverages in the evening, avoid excessive stimulation in the evening and generally using bed primarily for sleeping.  One hour before bedtime would recommend turning lights down lower, decreasing your activity (may read quietly, listen to music at a low volume). When you get into bed, should eliminate all technology (no texting, emailing, playing with your phone, iPad or tablet in bed).  [x] HYDRATION - Maintain good hydration.  Drink  2L of fluid a day (4 typical small water bottles)  [x] DIET - Maintain good nutrition. In particular don't skip meals and try and eat healthy balanced meals regularly.  [x] TRIGGERS - Look for other triggers and avoid them: Limit caffeine to 1-2 cups a day or less. Avoid dietary triggers that you have noticed bring on your headaches (this could include aged  cheese, peanuts, MSG, aspartame and nitrates).  [x] EXERCISE - physical exercise as we all know is good for you in many ways, and not only is good for your heart, but also is beneficial for your mental health, cognitive health and  chronic pain/headaches. I would encourage at the least 5 days of physical exercise weekly for at least 30 minutes.     Education and Follow-up  [x] Please call with any questions or concerns. Of course if any new concerning symptoms go to the emergency department.  [x] Follow up with 4 months with Isaiah AVALOS      CC:   We had the pleasure of evaluating Aaliyah Gomez in neurological consultation today. Aaliyah Gomez is a  28 y.o. female who presents today for evaluation of headaches.     History obtained from patient as well as available medical record review.  History of Present Illness:   Current medical illnesses  or past medical history include migraines, asthma during pregnancy, dysmenorrhea, dysphoric mood, postpartum depression      Interval History:    Headaches started at what age? 27 years old, started after birth of her fourth child. Concussion in February of 2024 she had hit her head on the corner of her fireplace when bending over. Headaches much worse afterwards.   How often do the headaches occur?   - as of 8/13/2024: 30/30 days in the month with some headache, 8-12/30 days in the month with severe headaches   What time of the day do the headaches start?  Sometimes waking up with them, not always waking up with them. Can be before bed or after work.   How long do the headaches last? Usually headaches lasting until going to sleep at night. Will wake up with the headache when she goes to bed with a headache. Tylenol PM at night to help with sleep. Sumatriptan did not really seem to help, prescribed Maxalt but not taken yet.   Are you ever headache free? No    Aura? with aura, loss of vision and tunnel vision out of both eyes. Usually happening before a  headache comes on, just a few minutes before the headache sets in.      Where is your headache located and pain quality? Bilateral temples, bilateral top of the head, and back of the head. Stabbing pain in the temples, pressure at the top and stiff or sore neck.   What is the intensity of pain? Worst 8/10, Average: 6/10  Associated symptoms:   [x] Nausea  [x] Stiff or sore neck   [x] Problems with concentration  [x] Photophobia     [x]Phonophobia      [x] Osmophobia  [x] Blurred vision/loss of vision (loss of vision lasting about 20 minutes in length, right eye worse than left)  [x] Prefer quiet, dark room  [x] Light-headed or dizzy     [x] Tinnitus (right ear mostly)  [x] Lacrimation  [x] Nasal congestion/rhinorrhea      Things that make the headache worse? Coughing, sneezing, bending over makes the headache worse when she does have them already     Any positional change headaches? Standing up too fast can actually trigger the headache    Headache triggers:  stress,     Have you seen someone else for headaches or pain? No  Have you had trigger point injection performed and how often? No  Have you had Botox injection performed and how often? No   Have you had epidural injections or transforaminal injections performed? Yes, epidurals for child birth she states  Are you current pregnant or planning on getting pregnant? Not currently pregnant, just using condoms for contraception. Not planning on having another child at this time. Not currently breastfeeding she states.   Have you ever had any Brain imaging? Yes, CT head without contrast in 02/09/2024    Last eye exam: Two months ago, she did have pictured of the back of her eye she states. No issues with swelling  at the optic nerve she states.     What medications do you take or have you taken for your headaches?   ABORTIVE:    OTC medications: Tylenol PM  Prescription: Maxalt (has not tried yet)    Past/ failed/contraindicated:  OTC medications: None  Prescription:  sumatriptan (not effective), Toradol IM injection    PREVENTIVE:   None    Past/ failed/contraindicated:  Zoloft (depression)      LIFESTYLE  Sleep   - averages: 6-8 hours of sleep at night  Problems falling asleep?:   Yes, Tylenol PM or melatonin to always fall asleep   Problems staying asleep?:  No    - No issues with snoring or difficulty breathing when sleeping she states     Physical activity: She does small exercises at home she states     Water: 2-3, 32 ounce water bottle per day  Caffeine: 1 cup of coffee in the morning     Mood: History of depression, not just postpartum depression. Anxiety already bad as it is she states. Does have trouble sleeping because of this as well. She did use to talk with a therapist, she did talk with psychiatry in the past.     The following portions of the patient's history were reviewed and updated as appropriate: allergies, current medications, past family history, past medical history, past social history, past surgical history and problem list.    Pertinent family history:  Family history of headaches:  migraine headaches in mother  Any family history of aneurysms - No    Pertinent social history:  Work: [employer]  Education: high school diploma  Lives with boyfriend and 4 children      Illicit Drugs: denies  Alcohol/tobacco: Denies tobacco use, alcohol intake: social drinker    Past Medical History:     Past Medical History:   Diagnosis Date    Abnormal Pap smear of cervix     2020 pap negative;    Anemia     Asthma     uses inhaler    Depression     in the past    Genetic screening     2022-Hgb AA    Gonorrhea 2012    Migraine     tylenol    Miscarriage     x 1    Urinary tract infection     Varicella     patient had chicken pox       Patient Active Problem List   Diagnosis    Low grade squamous intraepithelial lesion (LGSIL) on cervical Pap smear    Asthma during pregnancy    Dysphoric mood    Irregular bleeding    Postcoital bleeding    Dysmenorrhea      (spontaneous vaginal delivery)    Anemia affecting pregnancy in third trimester    History of  delivery, currently pregnant in third trimester    Chronic nonintractable headache    Breast skin changes    Postpartum depression    Concussion with no loss of consciousness    Migraine with status migrainosus, not intractable       Medications:      Current Outpatient Medications   Medication Sig Dispense Refill    albuterol (PROVENTIL HFA,VENTOLIN HFA) 90 mcg/act inhaler Inhale 2 puffs every 6 (six) hours as needed for wheezing      Ferrous Sulfate Dried ER (Slow Release Iron) 160 (50 Fe) MG TBCR One daily 90 tablet 1    rizatriptan (MAXALT) 10 mg tablet Take 1 tablet (10 mg total) by mouth once as needed for migraine for up to 1 dose may repeat in 2 hours if necessary 10 tablet 5     No current facility-administered medications for this visit.        Allergies:      Allergies   Allergen Reactions    Fruit Extracts      Category: Allergy; Annotation - [id number]: pomegranite lotion has skin reaction, pomegranite fruit has itching and swelling       Family History:     Family History   Problem Relation Age of Onset    Migraines Mother     Asthma Mother     Transient ischemic attack Father     Asthma Father     No Known Problems Son     No Known Problems Son     Cancer Maternal Grandmother         lung    Cancer Paternal Grandmother         ? type    Breast cancer Family     No Known Problems Half-Sister     No Known Problems Half-Sister     No Known Problems Half-Sister     Asthma Half-Brother     No Known Problems Half-Brother     Colon cancer Neg Hx     Ovarian cancer Neg Hx     Deep vein thrombosis Neg Hx     Pulmonary embolism Neg Hx     Venous thrombosis Neg Hx     Diabetes Neg Hx     Heart attack Neg Hx     Heart disease Neg Hx     Hypertension Neg Hx     Miscarriages / Stillbirths Neg Hx     Seizures Neg Hx     Thyroid disease Neg Hx        Social History:       Social History     Socioeconomic History     Marital status: Single     Spouse name: Not on file    Number of children: 1    Years of education: Not on file    Highest education level: Not on file   Occupational History    Not on file   Tobacco Use    Smoking status: Never    Smokeless tobacco: Never   Vaping Use    Vaping status: Never Used   Substance and Sexual Activity    Alcohol use: Yes     Comment: social    Drug use: No    Sexual activity: Yes     Partners: Male     Birth control/protection: Implant   Other Topics Concern    Not on file   Social History Narrative    Always uses seat belt    Caffeine use    No Faith beliefs     Social Determinants of Health     Financial Resource Strain: Not on file   Food Insecurity: Not on file   Transportation Needs: Not on file   Physical Activity: Not on file   Stress: Not on file   Social Connections: Not on file   Intimate Partner Violence: Not on file   Housing Stability: Not on file         Objective:     Physical Exam:                                                                 Vitals:            Constitutional:    /64 (BP Location: Left arm, Patient Position: Sitting, Cuff Size: Adult)   Pulse 67   Temp 98.5 °F (36.9 °C) (Temporal)   Wt 65.5 kg (144 lb 8 oz)   SpO2 100%   BMI 28.22 kg/m²   BP Readings from Last 3 Encounters:   08/13/24 102/64   08/06/24 98/62   04/19/24 90/60     Pulse Readings from Last 3 Encounters:   08/13/24 67   08/06/24 59   04/19/24 79         Well developed, well nourished, well groomed. No dysmorphic features.       Psychiatric:  Normal behavior and appropriate affect        Neurological Examination:     Mental status/cognitive function:   Orientated to time, place and person. Recent and remote memory intact. Attention span and concentration as well as fund of knowledge are appropriate for age. Normal language and spontaneous speech.    Cranial Nerves:  II-visual fields full.   III, IV, VI-Pupils were equal, round, and reactive to light and accomodation.  Extraocular movements were full and conjugate without nystagmus. Conjugate gaze, normal smooth pursuits, normal saccades   V-facial sensation symmetric.    VII-facial expression symmetric, intact forehead wrinkle, strong eye closure, symmetric smile    VIII-hearing grossly intact bilaterally   IX, X-palate elevation symmetric, no dysarthria.   XI-shoulder shrug strength intact    XII-tongue protrusion midline.    Motor Exam: symmetric bulk and tone throughout, no pronator drift. Power/strength 5/5 bilateral upper and lower extremities, no atrophy, fasciculations or abnormal movements noted.   Sensory: grossly intact light touch in all extremities.   Reflexes: brachioradialis 2+, biceps 2+, knee 2+ bilaterally  Coordination: Finger nose finger intact bilaterally, no apparent dysmetria, ataxia or tremor noted  Gait: steady casual and tandem gait.       Pertinent Imaging:     CT head wo contrast, 02/09/2024:    IMPRESSION:     No acute intracranial abnormality.     Review of Systems:     Review of Systems   Constitutional:  Negative for appetite change, fatigue and fever.   HENT: Negative.  Negative for hearing loss, tinnitus, trouble swallowing and voice change.    Eyes:  Positive for photophobia, pain and visual disturbance.   Respiratory: Negative.  Negative for shortness of breath.    Cardiovascular: Negative.  Negative for palpitations.   Gastrointestinal:  Positive for nausea and vomiting.   Endocrine: Negative.  Negative for cold intolerance.   Genitourinary: Negative.  Negative for dysuria, frequency and urgency.   Musculoskeletal:  Positive for neck pain and neck stiffness. Negative for back pain, gait problem and myalgias.   Skin: Negative.  Negative for rash.   Allergic/Immunologic: Negative.    Neurological:  Positive for headaches. Negative for dizziness, tremors, seizures, syncope, facial asymmetry, speech difficulty, weakness, light-headedness and numbness.   Hematological: Negative.  Does not  bruise/bleed easily.   Psychiatric/Behavioral: Negative.  Negative for confusion, hallucinations and sleep disturbance.    All other systems reviewed and are negative.     I have spent 60 minutes with the patient today in which greater than 50% of this time was spent in counseling/coordination of care regarding Diagnostic results, Risks and benefits of tx options, Instructions for management, Patient and family education, Importance of tx compliance, Risk factor reductions, Impressions, Counseling / Coordination of care, Documenting in the medical record, Reviewing / ordering tests, medicine, procedures  , and Obtaining or reviewing history  . I also spent 15 minutes non face to face for this patient the same day.     Activity Minutes   Precharting/reviewing 5   Patient care/counseling 60   Postcharting/care coordination 10       Author:  Jameson Mulligan PA-C 8/13/2024 11:09 AM

## 2024-12-18 ENCOUNTER — TELEPHONE (OUTPATIENT)
Dept: NEUROLOGY | Facility: CLINIC | Age: 29
End: 2024-12-18